# Patient Record
Sex: MALE | Race: WHITE | NOT HISPANIC OR LATINO | Employment: OTHER | URBAN - METROPOLITAN AREA
[De-identification: names, ages, dates, MRNs, and addresses within clinical notes are randomized per-mention and may not be internally consistent; named-entity substitution may affect disease eponyms.]

---

## 2017-01-17 ENCOUNTER — OFFICE VISIT (OUTPATIENT)
Dept: ORTHOPEDIC SURGERY | Facility: CLINIC | Age: 72
End: 2017-01-17

## 2017-01-17 VITALS — TEMPERATURE: 97.6 F | BODY MASS INDEX: 25.77 KG/M2 | WEIGHT: 174 LBS | HEIGHT: 69 IN

## 2017-01-17 DIAGNOSIS — M54.50 LUMBAR SPINE PAIN: Primary | ICD-10-CM

## 2017-01-17 DIAGNOSIS — M54.5 LOW BACK PAIN, UNSPECIFIED BACK PAIN LATERALITY, UNSPECIFIED CHRONICITY, WITH SCIATICA PRESENCE UNSPECIFIED: Primary | ICD-10-CM

## 2017-01-17 DIAGNOSIS — M48.061 SPINAL STENOSIS OF LUMBAR REGION: ICD-10-CM

## 2017-01-17 PROCEDURE — 72100 X-RAY EXAM L-S SPINE 2/3 VWS: CPT | Performed by: ORTHOPAEDIC SURGERY

## 2017-01-17 PROCEDURE — 99214 OFFICE O/P EST MOD 30 MIN: CPT | Performed by: ORTHOPAEDIC SURGERY

## 2017-01-17 NOTE — LETTER
January 17, 2017     Chemo Patino MD  [address]  [address]  [facility] [address]    Patient: Coleman Bear   YOB: 1945   Date of Visit: 1/17/2017       Dear Dr. Carey MD:    Thank you for referring Coleman Bear to me for evaluation. Below are the relevant portions of my assessment and plan of care.    If you have questions, please do not hesitate to call me. I look forward to following Coleman along with you.         Sincerely,        Cameron Mcneil MD        CC: MD Cameron Hawk MD  1/17/2017  1:19 PM  Signed  He still complains of pain in the left lower extremity in the thigh mostly status post L3 to 5 laminectomy and fusion with instrumentation.  As noted above when I last saw him in 2014 we sent him for epidurals for L2-3 stenosis.  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  Musculoskeletal:  Posture is unremarkable to coronal and sagittal inspection.  Motion appears slightly limited.  The skin about the area is notable for well-healed midline incision.  There is no palpable or visible deformity.  There is no local spasm.   There is no tenderness to percussion and palpation of the spine.      Neurologic:  In the bilateral lower extremities there is no evidence of atrophy.  Motor function is undisturbed in quadriceps, EHL, and gastrocnemius on the right and slight quad weakness on the left   Sensation appears symmetrically intact to light touch   .  Reflexes are 2+ and symmetrical in the patellae and absent in the Achilles.  Clonus is absent..  Gait appears undisturbed.  SLR test negative    I reviewed his plain films which show degenerative changes of mild extent above an L3 to 5 solid fusion on lumbar two-view x-rays which are basically unchanged from 2 years ago.  I also reviewed his MRI from that time which showed moderate stenosis at  L2-3.    He has L2-3 recurrent spinal stenosis for which she wants more aggressive treatment.  I'm ordering an MRI scan of the lumbar spine and have given him a prescription for tramadol.  We'll go ahead and get him an appointment to see Dr. Nate Rodriguez as well but he wants to to do the next surgery at [facility] rather than [facility]

## 2017-01-17 NOTE — PROGRESS NOTES
He still complains of pain in the left lower extremity in the thigh mostly status post L3 to 5 laminectomy and fusion with instrumentation.  As noted above when I last saw him in 2014 we sent him for epidurals for L2-3 stenosis.  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  Musculoskeletal:  Posture is unremarkable to coronal and sagittal inspection.  Motion appears slightly limited.  The skin about the area is notable for well-healed midline incision.  There is no palpable or visible deformity.  There is no local spasm.   There is no tenderness to percussion and palpation of the spine.      Neurologic:  In the bilateral lower extremities there is no evidence of atrophy.  Motor function is undisturbed in quadriceps, EHL, and gastrocnemius on the right and slight quad weakness on the left   Sensation appears symmetrically intact to light touch   .  Reflexes are 2+ and symmetrical in the patellae and absent in the Achilles.  Clonus is absent..  Gait appears undisturbed.  SLR test negative    I reviewed his plain films which show degenerative changes of mild extent above an L3 to 5 solid fusion on lumbar two-view x-rays which are basically unchanged from 2 years ago.  I also reviewed his MRI from that time which showed moderate stenosis at L2-3.    He has L2-3 recurrent spinal stenosis for which she wants more aggressive treatment.  I'm ordering an MRI scan of the lumbar spine and have given him a prescription for tramadol.  We'll go ahead and get him an appointment to see Dr. Nate Rodriguez as well but he wants to to do the next surgery at [facility] rather than [facility]

## 2017-01-19 DIAGNOSIS — M48.061 LUMBAR STENOSIS: Primary | ICD-10-CM

## 2017-01-21 ENCOUNTER — TELEPHONE (OUTPATIENT)
Dept: ORTHOPEDIC SURGERY | Facility: CLINIC | Age: 72
End: 2017-01-21

## 2017-01-21 NOTE — TELEPHONE ENCOUNTER
Have spoken with the patient and his wife he is ready to go ahead and schedule surgery and would like to be done sometime in February

## 2017-01-21 NOTE — TELEPHONE ENCOUNTER
----- Message from Cameron Mcneil MD sent at 1/19/2017 12:29 PM EST -----  Let  him know that the MRI scan shows moderate stenosis at L2-3 above the fused area.  He is going to require decompression and extension of the fusion.  Get him an appointment to see Dr. Nate Rodriguez and we can go ahead and schedule this with Dr. Nate Rodriguez at [facility], as the patient does not want to return to our.  Case request is submitted.

## 2017-02-02 ENCOUNTER — APPOINTMENT (OUTPATIENT)
Dept: PREADMISSION TESTING | Facility: HOSPITAL | Age: 72
End: 2017-02-02

## 2017-02-06 DIAGNOSIS — M48.061 LUMBAR STENOSIS: Primary | ICD-10-CM

## 2017-02-07 ENCOUNTER — APPOINTMENT (OUTPATIENT)
Dept: PREADMISSION TESTING | Facility: HOSPITAL | Age: 72
End: 2017-02-07

## 2017-02-07 ENCOUNTER — TELEPHONE (OUTPATIENT)
Dept: ORTHOPEDIC SURGERY | Facility: CLINIC | Age: 72
End: 2017-02-07

## 2017-02-07 VITALS
DIASTOLIC BLOOD PRESSURE: 74 MMHG | WEIGHT: 173 LBS | TEMPERATURE: 98.3 F | RESPIRATION RATE: 20 BRPM | SYSTOLIC BLOOD PRESSURE: 124 MMHG | BODY MASS INDEX: 25.62 KG/M2 | OXYGEN SATURATION: 97 % | HEART RATE: 63 BPM | HEIGHT: 69 IN

## 2017-02-07 LAB
ANION GAP SERPL CALCULATED.3IONS-SCNC: 16.1 MMOL/L
BUN BLD-MCNC: 18 MG/DL (ref 8–23)
BUN/CREAT SERPL: 18 (ref 7–25)
CALCIUM SPEC-SCNC: 9.6 MG/DL (ref 8.6–10.5)
CHLORIDE SERPL-SCNC: 101 MMOL/L (ref 98–107)
CO2 SERPL-SCNC: 22.9 MMOL/L (ref 22–29)
CREAT BLD-MCNC: 1 MG/DL (ref 0.76–1.27)
DEPRECATED RDW RBC AUTO: 39.1 FL (ref 37–54)
ERYTHROCYTE [DISTWIDTH] IN BLOOD BY AUTOMATED COUNT: 13 % (ref 11.5–14.5)
GFR SERPL CREATININE-BSD FRML MDRD: 73 ML/MIN/1.73
GLUCOSE BLD-MCNC: 109 MG/DL (ref 65–99)
HCT VFR BLD AUTO: 42.7 % (ref 40.4–52.2)
HGB BLD-MCNC: 14.7 G/DL (ref 13.7–17.6)
MCH RBC QN AUTO: 29 PG (ref 27–32.7)
MCHC RBC AUTO-ENTMCNC: 34.4 G/DL (ref 32.6–36.4)
MCV RBC AUTO: 84.2 FL (ref 79.8–96.2)
PLATELET # BLD AUTO: 185 10*3/MM3 (ref 140–500)
PMV BLD AUTO: 10.8 FL (ref 6–12)
POTASSIUM BLD-SCNC: 3.7 MMOL/L (ref 3.5–5.2)
RBC # BLD AUTO: 5.07 10*6/MM3 (ref 4.6–6)
SODIUM BLD-SCNC: 140 MMOL/L (ref 136–145)
WBC NRBC COR # BLD: 6.77 10*3/MM3 (ref 4.5–10.7)

## 2017-02-07 PROCEDURE — 85027 COMPLETE CBC AUTOMATED: CPT | Performed by: ORTHOPAEDIC SURGERY

## 2017-02-07 PROCEDURE — 93010 ELECTROCARDIOGRAM REPORT: CPT | Performed by: INTERNAL MEDICINE

## 2017-02-07 PROCEDURE — 93005 ELECTROCARDIOGRAM TRACING: CPT

## 2017-02-07 PROCEDURE — 80048 BASIC METABOLIC PNL TOTAL CA: CPT | Performed by: ORTHOPAEDIC SURGERY

## 2017-02-07 PROCEDURE — 36415 COLL VENOUS BLD VENIPUNCTURE: CPT

## 2017-02-07 NOTE — DISCHARGE INSTRUCTIONS
Take the following medications the morning of surgery with a small sip of water.  Amlodipine, triamterene        General Instructions:  • Do not eat or drink after midnight: includes water, mints, or gum. You may brush your teeth.  • Do not smoke, chew tobacco, or drink alcohol.  • Bring medications in original bottles, any inhalers and if applicable your C-PAP/ BI-PAP machine.  • Bring any papers given to you in the doctor’s office.  • Wear clean comfortable clothes and socks.  • Do not wear contact lenses or make-up.  Bring a case for your glasses if applicable.   • Bring crutches or walker if applicable.  • Leave all other valuables and jewelry at home.    If you were given a blood bank ID arm band remember to bring it with you the day of surgery.    Preventing a Surgical Site Infection:  Shower on the morning of surgery using a fresh bar of anti-bacterial soap (such as Dial) and clean washcloth.  Dry with a clean towel and dress in clean clothing.  For 2 to 3 days before surgery, avoid shaving with a razor near where you will have surgery because the razor can irritate skin and make it easier to develop an infection  Ask your surgeon if you will be receiving antibiotics prior to surgery  Make sure you, your family, and all healthcare providers clean their hands with soap and water or an alcohol based hand  before caring for you or your wound  If at all possible, quit smoking as many days before surgery as you can.    Day of surgery:  Upon arrival, a Pre-op nurse and Anesthesiologist will review your health history, obtain vital signs, and answer questions you may have.  The only belongings needed at this time will be your home medications and if applicable your C-PAP/BI-PAP machine.  If you are staying overnight your family can leave the rest of your belongings in the car and bring them to your room later.  A Pre-op nurse will start an IV and you may receive medication in preparation for surgery,  including something to help you relax.  Your family will be able to see you in the Pre-op area.  While you are in surgery your family should notify the waiting room  if they leave the waiting room area and provide a contact phone number.    Please be aware that surgery does come with discomfort.  We want to make every effort to control your discomfort so please discuss any uncontrolled symptoms with your nurse.   Your doctor will most likely have prescribed pain medications.      If you are going home after surgery you will receive individualized written care instructions before being discharged.  A responsible adult must drive you to and from the hospital on the day of your surgery and stay with you for 24 hours.    If you are staying overnight following surgery, you will be transported to your hospital room following the recovery period.  [facility] has all private rooms.    If you have any questions please call Pre-Admission Testing at [phone].  Deductibles and co-payments are collected on the day of service. Please be prepared to pay the required co-pay, deductible or deposit on the day of service as defined by your plan.

## 2017-02-16 ENCOUNTER — ANESTHESIA EVENT (OUTPATIENT)
Dept: PERIOP | Facility: HOSPITAL | Age: 72
End: 2017-02-16

## 2017-02-16 ENCOUNTER — ANESTHESIA (OUTPATIENT)
Dept: PERIOP | Facility: HOSPITAL | Age: 72
End: 2017-02-16

## 2017-02-16 ENCOUNTER — APPOINTMENT (OUTPATIENT)
Dept: GENERAL RADIOLOGY | Facility: HOSPITAL | Age: 72
End: 2017-02-16

## 2017-02-16 ENCOUNTER — HOSPITAL ENCOUNTER (INPATIENT)
Facility: HOSPITAL | Age: 72
LOS: 7 days | Discharge: HOME | End: 2017-02-23
Attending: ORTHOPAEDIC SURGERY | Admitting: ORTHOPAEDIC SURGERY

## 2017-02-16 DIAGNOSIS — N40.1 BENIGN PROSTATIC HYPERPLASIA WITH LOWER URINARY TRACT SYMPTOMS, UNSPECIFIED MORPHOLOGY: ICD-10-CM

## 2017-02-16 DIAGNOSIS — M48.061 LUMBAR SPINAL STENOSIS: ICD-10-CM

## 2017-02-16 DIAGNOSIS — R53.1 GENERALIZED WEAKNESS: Primary | ICD-10-CM

## 2017-02-16 LAB
HCT VFR BLD AUTO: 40.1 % (ref 40.4–52.2)
HGB BLD-MCNC: 13.7 G/DL (ref 13.7–17.6)

## 2017-02-16 PROCEDURE — C1713 ANCHOR/SCREW BN/BN,TIS/BN: HCPCS | Performed by: ORTHOPAEDIC SURGERY

## 2017-02-16 PROCEDURE — 25010000002 MIDAZOLAM PER 1 MG

## 2017-02-16 PROCEDURE — 76000 FLUOROSCOPY <1 HR PHYS/QHP: CPT

## 2017-02-16 PROCEDURE — 25010000003 CEFAZOLIN IN DEXTROSE 2-4 GM/100ML-% SOLUTION: Performed by: ORTHOPAEDIC SURGERY

## 2017-02-16 PROCEDURE — 94799 UNLISTED PULMONARY SVC/PX: CPT

## 2017-02-16 PROCEDURE — 25010000002 PROPOFOL 10 MG/ML EMULSION: Performed by: NURSE ANESTHETIST, CERTIFIED REGISTERED

## 2017-02-16 PROCEDURE — 38220 DX BONE MARROW ASPIRATIONS: CPT | Performed by: ORTHOPAEDIC SURGERY

## 2017-02-16 PROCEDURE — 25010000002 ONDANSETRON PER 1 MG: Performed by: NURSE ANESTHETIST, CERTIFIED REGISTERED

## 2017-02-16 PROCEDURE — 20936 SP BONE AGRFT LOCAL ADD-ON: CPT | Performed by: ORTHOPAEDIC SURGERY

## 2017-02-16 PROCEDURE — 22612 ARTHRD PST TQ 1NTRSPC LUMBAR: CPT | Performed by: ORTHOPAEDIC SURGERY

## 2017-02-16 PROCEDURE — 25010000002 POTASSIUM CHLORIDE PER 2 MEQ OF POTASSIUM: Performed by: ORTHOPAEDIC SURGERY

## 2017-02-16 PROCEDURE — 25010000002 DEXAMETHASONE PER 1 MG: Performed by: NURSE ANESTHETIST, CERTIFIED REGISTERED

## 2017-02-16 PROCEDURE — 85018 HEMOGLOBIN: CPT | Performed by: ORTHOPAEDIC SURGERY

## 2017-02-16 PROCEDURE — 25010000002 HYDROMORPHONE PER 4 MG: Performed by: NURSE ANESTHETIST, CERTIFIED REGISTERED

## 2017-02-16 PROCEDURE — 0SG0071 FUSION OF LUMBAR VERTEBRAL JOINT WITH AUTOLOGOUS TISSUE SUBSTITUTE, POSTERIOR APPROACH, POSTERIOR COLUMN, OPEN APPROACH: ICD-10-PCS | Performed by: ORTHOPAEDIC SURGERY

## 2017-02-16 PROCEDURE — 25010000002 FENTANYL CITRATE (PF) 100 MCG/2ML SOLUTION: Performed by: NURSE ANESTHETIST, CERTIFIED REGISTERED

## 2017-02-16 PROCEDURE — 25010000003 CEFAZOLIN PER 500 MG: Performed by: ORTHOPAEDIC SURGERY

## 2017-02-16 PROCEDURE — S0260 H&P FOR SURGERY: HCPCS | Performed by: ORTHOPAEDIC SURGERY

## 2017-02-16 PROCEDURE — 20930 SP BONE ALGRFT MORSEL ADD-ON: CPT | Performed by: ORTHOPAEDIC SURGERY

## 2017-02-16 PROCEDURE — 22840 INSERT SPINE FIXATION DEVICE: CPT | Performed by: ORTHOPAEDIC SURGERY

## 2017-02-16 PROCEDURE — 85014 HEMATOCRIT: CPT | Performed by: ORTHOPAEDIC SURGERY

## 2017-02-16 PROCEDURE — 72100 X-RAY EXAM L-S SPINE 2/3 VWS: CPT

## 2017-02-16 PROCEDURE — 25010000002 ONDANSETRON PER 1 MG: Performed by: ORTHOPAEDIC SURGERY

## 2017-02-16 PROCEDURE — 25010000002 NEOSTIGMINE 10 MG/10ML SOLUTION: Performed by: NURSE ANESTHETIST, CERTIFIED REGISTERED

## 2017-02-16 PROCEDURE — 07DR3ZZ EXTRACTION OF ILIAC BONE MARROW, PERCUTANEOUS APPROACH: ICD-10-PCS | Performed by: ORTHOPAEDIC SURGERY

## 2017-02-16 PROCEDURE — 63047 LAM FACETEC & FORAMOT LUMBAR: CPT | Performed by: ORTHOPAEDIC SURGERY

## 2017-02-16 DEVICE — SCRW EXPEDIUM PA TI 6X45MM: Type: IMPLANTABLE DEVICE | Site: SPINE LUMBAR | Status: FUNCTIONAL

## 2017-02-16 DEVICE — STRIP 7800310 MASTERGRAFT STRIP 10CM
Type: IMPLANTABLE DEVICE | Site: SPINE LUMBAR | Status: FUNCTIONAL
Brand: MASTERGRAFT® STRIP

## 2017-02-16 DEVICE — SCRW INNR EXPEDIUM: Type: IMPLANTABLE DEVICE | Site: SPINE LUMBAR | Status: FUNCTIONAL

## 2017-02-16 DEVICE — ROD PREBNT SPINE EXPEDIUM TI 5.5X45MM: Type: IMPLANTABLE DEVICE | Site: SPINE LUMBAR | Status: FUNCTIONAL

## 2017-02-16 RX ORDER — HYDROCODONE BITARTRATE AND ACETAMINOPHEN 5; 325 MG/1; MG/1
1 TABLET ORAL ONCE AS NEEDED
Status: DISCONTINUED | OUTPATIENT
Start: 2017-02-16 | End: 2017-02-16 | Stop reason: HOSPADM

## 2017-02-16 RX ORDER — LIDOCAINE HYDROCHLORIDE 20 MG/ML
INJECTION, SOLUTION INFILTRATION; PERINEURAL AS NEEDED
Status: DISCONTINUED | OUTPATIENT
Start: 2017-02-16 | End: 2017-02-16 | Stop reason: SURG

## 2017-02-16 RX ORDER — TERAZOSIN 5 MG/1
5 CAPSULE ORAL EVERY MORNING
Status: DISCONTINUED | OUTPATIENT
Start: 2017-02-17 | End: 2017-02-23 | Stop reason: HOSPADM

## 2017-02-16 RX ORDER — CEFAZOLIN SODIUM 2 G/100ML
2 INJECTION, SOLUTION INTRAVENOUS ONCE
Status: COMPLETED | OUTPATIENT
Start: 2017-02-16 | End: 2017-02-16

## 2017-02-16 RX ORDER — MIDAZOLAM HYDROCHLORIDE 1 MG/ML
2 INJECTION INTRAMUSCULAR; INTRAVENOUS
Status: DISCONTINUED | OUTPATIENT
Start: 2017-02-16 | End: 2017-02-16 | Stop reason: HOSPADM

## 2017-02-16 RX ORDER — BISACODYL 10 MG
10 SUPPOSITORY, RECTAL RECTAL DAILY PRN
Status: DISCONTINUED | OUTPATIENT
Start: 2017-02-16 | End: 2017-02-23 | Stop reason: HOSPADM

## 2017-02-16 RX ORDER — PROMETHAZINE HYDROCHLORIDE 25 MG/1
25 TABLET ORAL ONCE AS NEEDED
Status: DISCONTINUED | OUTPATIENT
Start: 2017-02-16 | End: 2017-02-16 | Stop reason: HOSPADM

## 2017-02-16 RX ORDER — GLYCOPYRROLATE 0.2 MG/ML
INJECTION INTRAMUSCULAR; INTRAVENOUS AS NEEDED
Status: DISCONTINUED | OUTPATIENT
Start: 2017-02-16 | End: 2017-02-16 | Stop reason: SURG

## 2017-02-16 RX ORDER — ONDANSETRON 2 MG/ML
4 INJECTION INTRAMUSCULAR; INTRAVENOUS EVERY 6 HOURS PRN
Status: DISCONTINUED | OUTPATIENT
Start: 2017-02-16 | End: 2017-02-23 | Stop reason: HOSPADM

## 2017-02-16 RX ORDER — SODIUM CHLORIDE, SODIUM LACTATE, POTASSIUM CHLORIDE, CALCIUM CHLORIDE 600; 310; 30; 20 MG/100ML; MG/100ML; MG/100ML; MG/100ML
100 INJECTION, SOLUTION INTRAVENOUS CONTINUOUS
Status: DISCONTINUED | OUTPATIENT
Start: 2017-02-16 | End: 2017-02-18

## 2017-02-16 RX ORDER — FENTANYL CITRATE 50 UG/ML
INJECTION, SOLUTION INTRAMUSCULAR; INTRAVENOUS AS NEEDED
Status: DISCONTINUED | OUTPATIENT
Start: 2017-02-16 | End: 2017-02-16 | Stop reason: SURG

## 2017-02-16 RX ORDER — IPRATROPIUM BROMIDE AND ALBUTEROL SULFATE 2.5; .5 MG/3ML; MG/3ML
3 SOLUTION RESPIRATORY (INHALATION) ONCE AS NEEDED
Status: DISCONTINUED | OUTPATIENT
Start: 2017-02-16 | End: 2017-02-16 | Stop reason: HOSPADM

## 2017-02-16 RX ORDER — OXYCODONE HYDROCHLORIDE AND ACETAMINOPHEN 5; 325 MG/1; MG/1
2 TABLET ORAL EVERY 4 HOURS PRN
Status: DISCONTINUED | OUTPATIENT
Start: 2017-02-16 | End: 2017-02-23 | Stop reason: HOSPADM

## 2017-02-16 RX ORDER — PROMETHAZINE HYDROCHLORIDE 25 MG/1
25 SUPPOSITORY RECTAL ONCE AS NEEDED
Status: DISCONTINUED | OUTPATIENT
Start: 2017-02-16 | End: 2017-02-16 | Stop reason: HOSPADM

## 2017-02-16 RX ORDER — HYDROMORPHONE HYDROCHLORIDE 1 MG/ML
0.5 INJECTION, SOLUTION INTRAMUSCULAR; INTRAVENOUS; SUBCUTANEOUS
Status: DISCONTINUED | OUTPATIENT
Start: 2017-02-16 | End: 2017-02-16 | Stop reason: HOSPADM

## 2017-02-16 RX ORDER — ONDANSETRON 2 MG/ML
4 INJECTION INTRAMUSCULAR; INTRAVENOUS ONCE AS NEEDED
Status: DISCONTINUED | OUTPATIENT
Start: 2017-02-16 | End: 2017-02-16 | Stop reason: HOSPADM

## 2017-02-16 RX ORDER — PROMETHAZINE HYDROCHLORIDE 25 MG/ML
6.25 INJECTION, SOLUTION INTRAMUSCULAR; INTRAVENOUS ONCE AS NEEDED
Status: DISCONTINUED | OUTPATIENT
Start: 2017-02-16 | End: 2017-02-16 | Stop reason: HOSPADM

## 2017-02-16 RX ORDER — NEOSTIGMINE METHYLSULFATE 1 MG/ML
INJECTION, SOLUTION INTRAVENOUS AS NEEDED
Status: DISCONTINUED | OUTPATIENT
Start: 2017-02-16 | End: 2017-02-16 | Stop reason: SURG

## 2017-02-16 RX ORDER — SENNA AND DOCUSATE SODIUM 50; 8.6 MG/1; MG/1
1 TABLET, FILM COATED ORAL 2 TIMES DAILY
Status: DISCONTINUED | OUTPATIENT
Start: 2017-02-16 | End: 2017-02-19

## 2017-02-16 RX ORDER — ROCURONIUM BROMIDE 10 MG/ML
INJECTION, SOLUTION INTRAVENOUS AS NEEDED
Status: DISCONTINUED | OUTPATIENT
Start: 2017-02-16 | End: 2017-02-16 | Stop reason: SURG

## 2017-02-16 RX ORDER — SODIUM CHLORIDE AND POTASSIUM CHLORIDE 150; 450 MG/100ML; MG/100ML
100 INJECTION, SOLUTION INTRAVENOUS CONTINUOUS
Status: DISCONTINUED | OUTPATIENT
Start: 2017-02-16 | End: 2017-02-16 | Stop reason: SDUPTHER

## 2017-02-16 RX ORDER — SODIUM CHLORIDE 0.9 % (FLUSH) 0.9 %
1-10 SYRINGE (ML) INJECTION AS NEEDED
Status: DISCONTINUED | OUTPATIENT
Start: 2017-02-16 | End: 2017-02-16 | Stop reason: HOSPADM

## 2017-02-16 RX ORDER — AMLODIPINE BESYLATE 10 MG/1
10 TABLET ORAL EVERY MORNING
Status: DISCONTINUED | OUTPATIENT
Start: 2017-02-17 | End: 2017-02-23 | Stop reason: HOSPADM

## 2017-02-16 RX ORDER — ONDANSETRON 2 MG/ML
INJECTION INTRAMUSCULAR; INTRAVENOUS AS NEEDED
Status: DISCONTINUED | OUTPATIENT
Start: 2017-02-16 | End: 2017-02-16 | Stop reason: SURG

## 2017-02-16 RX ORDER — HYDROMORPHONE HCL IN 0.9% NACL 10 MG/50ML
PATIENT CONTROLLED ANALGESIA SYRINGE INTRAVENOUS CONTINUOUS
Status: DISCONTINUED | OUTPATIENT
Start: 2017-02-16 | End: 2017-02-18

## 2017-02-16 RX ORDER — FAMOTIDINE 10 MG/ML
20 INJECTION, SOLUTION INTRAVENOUS ONCE
Status: COMPLETED | OUTPATIENT
Start: 2017-02-16 | End: 2017-02-16

## 2017-02-16 RX ORDER — MIDAZOLAM HYDROCHLORIDE 1 MG/ML
1 INJECTION INTRAMUSCULAR; INTRAVENOUS
Status: DISCONTINUED | OUTPATIENT
Start: 2017-02-16 | End: 2017-02-16 | Stop reason: HOSPADM

## 2017-02-16 RX ORDER — CYCLOBENZAPRINE HCL 10 MG
10 TABLET ORAL 3 TIMES DAILY PRN
Status: DISCONTINUED | OUTPATIENT
Start: 2017-02-16 | End: 2017-02-23 | Stop reason: HOSPADM

## 2017-02-16 RX ORDER — LABETALOL HYDROCHLORIDE 5 MG/ML
5 INJECTION, SOLUTION INTRAVENOUS
Status: DISCONTINUED | OUTPATIENT
Start: 2017-02-16 | End: 2017-02-16 | Stop reason: HOSPADM

## 2017-02-16 RX ORDER — ONDANSETRON 4 MG/1
4 TABLET, FILM COATED ORAL EVERY 6 HOURS PRN
Status: DISCONTINUED | OUTPATIENT
Start: 2017-02-16 | End: 2017-02-23 | Stop reason: HOSPADM

## 2017-02-16 RX ORDER — FAMOTIDINE 10 MG/ML
INJECTION, SOLUTION INTRAVENOUS
Status: COMPLETED
Start: 2017-02-16 | End: 2017-02-16

## 2017-02-16 RX ORDER — ONDANSETRON 4 MG/1
4 TABLET, ORALLY DISINTEGRATING ORAL EVERY 6 HOURS PRN
Status: DISCONTINUED | OUTPATIENT
Start: 2017-02-16 | End: 2017-02-23 | Stop reason: HOSPADM

## 2017-02-16 RX ORDER — TRIAMTERENE AND HYDROCHLOROTHIAZIDE 37.5; 25 MG/1; MG/1
1 CAPSULE ORAL EVERY MORNING
Status: DISCONTINUED | OUTPATIENT
Start: 2017-02-17 | End: 2017-02-23 | Stop reason: HOSPADM

## 2017-02-16 RX ORDER — CEFAZOLIN SODIUM 2 G/100ML
2 INJECTION, SOLUTION INTRAVENOUS EVERY 8 HOURS
Status: COMPLETED | OUTPATIENT
Start: 2017-02-16 | End: 2017-02-17

## 2017-02-16 RX ORDER — PROPOFOL 10 MG/ML
VIAL (ML) INTRAVENOUS AS NEEDED
Status: DISCONTINUED | OUTPATIENT
Start: 2017-02-16 | End: 2017-02-16 | Stop reason: SURG

## 2017-02-16 RX ORDER — TEMAZEPAM 15 MG/1
15 CAPSULE ORAL NIGHTLY PRN
Status: DISCONTINUED | OUTPATIENT
Start: 2017-02-16 | End: 2017-02-23 | Stop reason: HOSPADM

## 2017-02-16 RX ORDER — NALOXONE HCL 0.4 MG/ML
0.1 VIAL (ML) INJECTION
Status: DISCONTINUED | OUTPATIENT
Start: 2017-02-16 | End: 2017-02-23 | Stop reason: HOSPADM

## 2017-02-16 RX ORDER — SODIUM CHLORIDE, SODIUM LACTATE, POTASSIUM CHLORIDE, CALCIUM CHLORIDE 600; 310; 30; 20 MG/100ML; MG/100ML; MG/100ML; MG/100ML
9 INJECTION, SOLUTION INTRAVENOUS CONTINUOUS
Status: DISCONTINUED | OUTPATIENT
Start: 2017-02-16 | End: 2017-02-18

## 2017-02-16 RX ORDER — DEXAMETHASONE SODIUM PHOSPHATE 10 MG/ML
INJECTION INTRAMUSCULAR; INTRAVENOUS AS NEEDED
Status: DISCONTINUED | OUTPATIENT
Start: 2017-02-16 | End: 2017-02-16 | Stop reason: SURG

## 2017-02-16 RX ORDER — MIDAZOLAM HYDROCHLORIDE 1 MG/ML
INJECTION INTRAMUSCULAR; INTRAVENOUS
Status: COMPLETED
Start: 2017-02-16 | End: 2017-02-16

## 2017-02-16 RX ORDER — POLYETHYLENE GLYCOL 3350 17 G/17G
17 POWDER, FOR SOLUTION ORAL DAILY PRN
Status: DISCONTINUED | OUTPATIENT
Start: 2017-02-16 | End: 2017-02-18

## 2017-02-16 RX ORDER — HYDROMORPHONE HCL IN 0.9% NACL 10 MG/50ML
PATIENT CONTROLLED ANALGESIA SYRINGE INTRAVENOUS
Status: COMPLETED
Start: 2017-02-16 | End: 2017-02-16

## 2017-02-16 RX ORDER — SODIUM CHLORIDE 0.9 % (FLUSH) 0.9 %
1-10 SYRINGE (ML) INJECTION AS NEEDED
Status: DISCONTINUED | OUTPATIENT
Start: 2017-02-16 | End: 2017-02-23 | Stop reason: HOSPADM

## 2017-02-16 RX ORDER — FENTANYL CITRATE 50 UG/ML
50 INJECTION, SOLUTION INTRAMUSCULAR; INTRAVENOUS
Status: DISCONTINUED | OUTPATIENT
Start: 2017-02-16 | End: 2017-02-16 | Stop reason: HOSPADM

## 2017-02-16 RX ORDER — HYDRALAZINE HYDROCHLORIDE 20 MG/ML
5 INJECTION INTRAMUSCULAR; INTRAVENOUS
Status: DISCONTINUED | OUTPATIENT
Start: 2017-02-16 | End: 2017-02-16 | Stop reason: HOSPADM

## 2017-02-16 RX ADMIN — FENTANYL CITRATE 50 MCG: 50 INJECTION INTRAMUSCULAR; INTRAVENOUS at 07:37

## 2017-02-16 RX ADMIN — FENTANYL CITRATE 50 MCG: 50 INJECTION INTRAMUSCULAR; INTRAVENOUS at 09:45

## 2017-02-16 RX ADMIN — FENTANYL CITRATE 25 MCG: 50 INJECTION INTRAMUSCULAR; INTRAVENOUS at 10:35

## 2017-02-16 RX ADMIN — GLYCOPYRROLATE 0.6 MG: 0.2 INJECTION INTRAMUSCULAR; INTRAVENOUS at 10:37

## 2017-02-16 RX ADMIN — FENTANYL CITRATE 50 MCG: 50 INJECTION INTRAMUSCULAR; INTRAVENOUS at 13:19

## 2017-02-16 RX ADMIN — PROPOFOL 150 MG: 10 INJECTION, EMULSION INTRAVENOUS at 07:37

## 2017-02-16 RX ADMIN — POTASSIUM CHLORIDE 100 ML/HR: 149 INJECTION, SOLUTION, CONCENTRATE INTRAVENOUS at 17:52

## 2017-02-16 RX ADMIN — HYDROMORPHONE HYDROCHLORIDE 0.5 MG: 1 INJECTION, SOLUTION INTRAMUSCULAR; INTRAVENOUS; SUBCUTANEOUS at 11:14

## 2017-02-16 RX ADMIN — FENTANYL CITRATE 50 MCG: 50 INJECTION INTRAMUSCULAR; INTRAVENOUS at 11:28

## 2017-02-16 RX ADMIN — ONDANSETRON 4 MG: 2 INJECTION INTRAMUSCULAR; INTRAVENOUS at 17:52

## 2017-02-16 RX ADMIN — Medication: at 11:08

## 2017-02-16 RX ADMIN — ROCURONIUM BROMIDE 10 MG: 10 INJECTION INTRAVENOUS at 08:45

## 2017-02-16 RX ADMIN — CEFAZOLIN SODIUM 2 G: 2 INJECTION, SOLUTION INTRAVENOUS at 17:51

## 2017-02-16 RX ADMIN — MIDAZOLAM 2 MG: 1 INJECTION INTRAMUSCULAR; INTRAVENOUS at 07:26

## 2017-02-16 RX ADMIN — ROCURONIUM BROMIDE 50 MG: 10 INJECTION INTRAVENOUS at 07:37

## 2017-02-16 RX ADMIN — EPHEDRINE SULFATE 10 MG: 50 INJECTION INTRAMUSCULAR; INTRAVENOUS; SUBCUTANEOUS at 08:57

## 2017-02-16 RX ADMIN — SODIUM CHLORIDE, POTASSIUM CHLORIDE, SODIUM LACTATE AND CALCIUM CHLORIDE: 600; 310; 30; 20 INJECTION, SOLUTION INTRAVENOUS at 09:01

## 2017-02-16 RX ADMIN — FENTANYL CITRATE 50 MCG: 50 INJECTION INTRAMUSCULAR; INTRAVENOUS at 12:15

## 2017-02-16 RX ADMIN — OXYCODONE HYDROCHLORIDE AND ACETAMINOPHEN 2 TABLET: 5; 325 TABLET ORAL at 23:37

## 2017-02-16 RX ADMIN — DOCUSATE SODIUM -SENNOSIDES 1 TABLET: 50; 8.6 TABLET, COATED ORAL at 17:51

## 2017-02-16 RX ADMIN — CEFAZOLIN SODIUM 2 G: 2 INJECTION, SOLUTION INTRAVENOUS at 07:32

## 2017-02-16 RX ADMIN — LIDOCAINE HYDROCHLORIDE 60 MG: 20 INJECTION, SOLUTION INFILTRATION; PERINEURAL at 07:37

## 2017-02-16 RX ADMIN — FENTANYL CITRATE 50 MCG: 50 INJECTION INTRAMUSCULAR; INTRAVENOUS at 08:07

## 2017-02-16 RX ADMIN — SODIUM CHLORIDE, POTASSIUM CHLORIDE, SODIUM LACTATE AND CALCIUM CHLORIDE 100 ML/HR: 600; 310; 30; 20 INJECTION, SOLUTION INTRAVENOUS at 11:54

## 2017-02-16 RX ADMIN — EPHEDRINE SULFATE 10 MG: 50 INJECTION INTRAMUSCULAR; INTRAVENOUS; SUBCUTANEOUS at 08:20

## 2017-02-16 RX ADMIN — NEOSTIGMINE METHYLSULFATE 4 MG: 1 INJECTION INTRAVENOUS at 10:37

## 2017-02-16 RX ADMIN — FENTANYL CITRATE 50 MCG: 50 INJECTION INTRAMUSCULAR; INTRAVENOUS at 11:14

## 2017-02-16 RX ADMIN — FAMOTIDINE 20 MG: 10 INJECTION, SOLUTION INTRAVENOUS at 07:26

## 2017-02-16 RX ADMIN — OXYCODONE HYDROCHLORIDE AND ACETAMINOPHEN 2 TABLET: 5; 325 TABLET ORAL at 17:51

## 2017-02-16 RX ADMIN — DEXAMETHASONE SODIUM PHOSPHATE 8 MG: 10 INJECTION INTRAMUSCULAR; INTRAVENOUS at 07:50

## 2017-02-16 RX ADMIN — SODIUM CHLORIDE, POTASSIUM CHLORIDE, SODIUM LACTATE AND CALCIUM CHLORIDE 9 ML/HR: 600; 310; 30; 20 INJECTION, SOLUTION INTRAVENOUS at 07:25

## 2017-02-16 RX ADMIN — ONDANSETRON 4 MG: 2 INJECTION INTRAMUSCULAR; INTRAVENOUS at 10:30

## 2017-02-16 RX ADMIN — MIDAZOLAM HYDROCHLORIDE 2 MG: 1 INJECTION INTRAMUSCULAR; INTRAVENOUS at 07:26

## 2017-02-16 RX ADMIN — ROCURONIUM BROMIDE 10 MG: 10 INJECTION INTRAVENOUS at 08:25

## 2017-02-16 NOTE — ANESTHESIA PREPROCEDURE EVALUATION
Anesthesia Evaluation        Airway   Mallampati: II  TM distance: >3 FB  Neck ROM: full  no difficulty expected  Dental      Comment: Upper front caps    Pulmonary - normal exam   Cardiovascular - normal exam    (+) hypertension,       Neuro/Psych  GI/Hepatic/Renal/Endo      Musculoskeletal     Abdominal    Substance History      OB/GYN          Other   (+) arthritis                                 Anesthesia Plan    ASA 2     general     intravenous induction   Anesthetic plan and risks discussed with patient.

## 2017-02-16 NOTE — ANESTHESIA PROCEDURE NOTES
Airway  Urgency: elective    Airway not difficult    General Information and Staff    Patient location during procedure: OR  Anesthesiologist: KISHORE XIONG  CRNA: MARIA DEL CARMEN CHAIDEZ    Indications and Patient Condition  Indications for airway management: airway protection    Preoxygenated: yes  MILS not maintained throughout  Mask difficulty assessment: 1 - vent by mask    Final Airway Details  Final airway type: endotracheal airway      Successful airway: ETT  Cuffed: yes   Successful intubation technique: direct laryngoscopy  Facilitating devices/methods: intubating stylet  Endotracheal tube insertion site: oral  Blade: Michael  Blade size: #3  ETT size: 8.0 mm  Cormack-Lehane Classification: grade I - full view of glottis  Placement verified by: chest auscultation   Cuff volume (mL): 9  Measured from: lips  ETT to lips (cm): 23  Number of attempts at approach: 1    Additional Comments  PreO2 100% face mask, IV induction, easy mask, DVL x1, cords noted, tube through, cuff up, EBBSH, +etCO2, = chest movement, tube secured in place, atraumatic, teeth and lips intact as preop.

## 2017-02-16 NOTE — ANESTHESIA POSTPROCEDURE EVALUATION
Patient: Coleman Bear    Procedure Summary     Date Anesthesia Start Anesthesia Stop Room / Location    02/16/17 0732 1055  [facility] / [facility]       Procedure Diagnosis Surgeon Provider    L2-L3 repeat laminectomy and fusion with instrumentation, removal instrumentation L3 to L5 (N/A Spine Lumbar) Lumbar stenosis  (Lumbar stenosis [M48.06]) Cameron Mcneil MD; MD Velvet Hawk MD          Anesthesia Type: general  Last vitals  /70 (02/16/17 1145)    Temp      Pulse 54 (02/16/17 1145)   Resp 16 (02/16/17 1145)    SpO2 98 % (02/16/17 1145)      Post Anesthesia Care and Evaluation    Patient location during evaluation: PACU  Patient participation: complete - patient participated  Level of consciousness: awake and alert  Pain management: adequate  Airway patency: patent  Anesthetic complications: No anesthetic complications    Cardiovascular status: acceptable  Respiratory status: acceptable  Hydration status: acceptable

## 2017-02-17 LAB — HGB BLD-MCNC: 11.9 G/DL (ref 13.7–17.6)

## 2017-02-17 PROCEDURE — 97110 THERAPEUTIC EXERCISES: CPT

## 2017-02-17 PROCEDURE — 97161 PT EVAL LOW COMPLEX 20 MIN: CPT

## 2017-02-17 PROCEDURE — 99024 POSTOP FOLLOW-UP VISIT: CPT | Performed by: ORTHOPAEDIC SURGERY

## 2017-02-17 PROCEDURE — 25010000003 CEFAZOLIN IN DEXTROSE 2-4 GM/100ML-% SOLUTION: Performed by: ORTHOPAEDIC SURGERY

## 2017-02-17 PROCEDURE — 85018 HEMOGLOBIN: CPT | Performed by: ORTHOPAEDIC SURGERY

## 2017-02-17 RX ADMIN — TERAZOSIN HYDROCHLORIDE 5 MG: 5 CAPSULE ORAL at 08:51

## 2017-02-17 RX ADMIN — CYCLOBENZAPRINE HYDROCHLORIDE 10 MG: 10 TABLET, FILM COATED ORAL at 16:53

## 2017-02-17 RX ADMIN — OXYCODONE HYDROCHLORIDE AND ACETAMINOPHEN 2 TABLET: 5; 325 TABLET ORAL at 13:33

## 2017-02-17 RX ADMIN — Medication: at 18:54

## 2017-02-17 RX ADMIN — OXYCODONE HYDROCHLORIDE AND ACETAMINOPHEN 2 TABLET: 5; 325 TABLET ORAL at 08:51

## 2017-02-17 RX ADMIN — AMLODIPINE BESYLATE 10 MG: 10 TABLET ORAL at 08:51

## 2017-02-17 RX ADMIN — BISACODYL 10 MG: 10 SUPPOSITORY RECTAL at 16:54

## 2017-02-17 RX ADMIN — POLYETHYLENE GLYCOL 3350 17 G: 17 POWDER, FOR SOLUTION ORAL at 21:21

## 2017-02-17 RX ADMIN — DOCUSATE SODIUM -SENNOSIDES 1 TABLET: 50; 8.6 TABLET, COATED ORAL at 08:52

## 2017-02-17 RX ADMIN — CEFAZOLIN SODIUM 2 G: 2 INJECTION, SOLUTION INTRAVENOUS at 01:42

## 2017-02-17 RX ADMIN — DOCUSATE SODIUM -SENNOSIDES 1 TABLET: 50; 8.6 TABLET, COATED ORAL at 21:22

## 2017-02-17 RX ADMIN — TRIAMTERENE AND HYDROCHLOROTHIAZIDE 1 CAPSULE: 37.5; 25 CAPSULE ORAL at 08:50

## 2017-02-17 RX ADMIN — CYCLOBENZAPRINE HYDROCHLORIDE 10 MG: 10 TABLET, FILM COATED ORAL at 03:35

## 2017-02-17 RX ADMIN — OXYCODONE HYDROCHLORIDE AND ACETAMINOPHEN 2 TABLET: 5; 325 TABLET ORAL at 03:35

## 2017-02-18 ENCOUNTER — APPOINTMENT (OUTPATIENT)
Dept: GENERAL RADIOLOGY | Facility: HOSPITAL | Age: 72
End: 2017-02-18

## 2017-02-18 LAB
ALBUMIN SERPL-MCNC: 3.3 G/DL (ref 3.5–5.2)
ALBUMIN/GLOB SERPL: 1.1 G/DL
ALP SERPL-CCNC: 59 U/L (ref 39–117)
ALT SERPL W P-5'-P-CCNC: 15 U/L (ref 1–41)
ANION GAP SERPL CALCULATED.3IONS-SCNC: 14.3 MMOL/L
AST SERPL-CCNC: 26 U/L (ref 1–40)
BILIRUB SERPL-MCNC: 1 MG/DL (ref 0.1–1.2)
BUN BLD-MCNC: 15 MG/DL (ref 8–23)
BUN/CREAT SERPL: 12.2 (ref 7–25)
CALCIUM SPEC-SCNC: 9.1 MG/DL (ref 8.6–10.5)
CHLORIDE SERPL-SCNC: 94 MMOL/L (ref 98–107)
CO2 SERPL-SCNC: 25.7 MMOL/L (ref 22–29)
CREAT BLD-MCNC: 1.23 MG/DL (ref 0.76–1.27)
GFR SERPL CREATININE-BSD FRML MDRD: 58 ML/MIN/1.73
GLOBULIN UR ELPH-MCNC: 3.1 GM/DL
GLUCOSE BLD-MCNC: 192 MG/DL (ref 65–99)
HGB BLD-MCNC: 12.3 G/DL (ref 13.7–17.6)
PHOSPHATE SERPL-MCNC: 2.7 MG/DL (ref 2.5–4.5)
POTASSIUM BLD-SCNC: 3.6 MMOL/L (ref 3.5–5.2)
PROT SERPL-MCNC: 6.4 G/DL (ref 6–8.5)
SODIUM BLD-SCNC: 134 MMOL/L (ref 136–145)

## 2017-02-18 PROCEDURE — 80053 COMPREHEN METABOLIC PANEL: CPT | Performed by: INTERNAL MEDICINE

## 2017-02-18 PROCEDURE — 25010000002 ONDANSETRON PER 1 MG: Performed by: ORTHOPAEDIC SURGERY

## 2017-02-18 PROCEDURE — 97110 THERAPEUTIC EXERCISES: CPT

## 2017-02-18 PROCEDURE — 99024 POSTOP FOLLOW-UP VISIT: CPT | Performed by: ORTHOPAEDIC SURGERY

## 2017-02-18 PROCEDURE — 74000 HC ABDOMEN KUB: CPT

## 2017-02-18 PROCEDURE — 84100 ASSAY OF PHOSPHORUS: CPT | Performed by: INTERNAL MEDICINE

## 2017-02-18 PROCEDURE — 85018 HEMOGLOBIN: CPT | Performed by: ORTHOPAEDIC SURGERY

## 2017-02-18 PROCEDURE — 94799 UNLISTED PULMONARY SVC/PX: CPT

## 2017-02-18 RX ORDER — POTASSIUM CHLORIDE 750 MG/1
40 CAPSULE, EXTENDED RELEASE ORAL ONCE
Status: COMPLETED | OUTPATIENT
Start: 2017-02-18 | End: 2017-02-18

## 2017-02-18 RX ORDER — OXYCODONE HYDROCHLORIDE AND ACETAMINOPHEN 5; 325 MG/1; MG/1
2 TABLET ORAL EVERY 4 HOURS PRN
Qty: 60 TABLET | Refills: 0 | Status: SHIPPED | OUTPATIENT
Start: 2017-02-18 | End: 2017-02-26

## 2017-02-18 RX ORDER — SODIUM CHLORIDE 9 MG/ML
125 INJECTION, SOLUTION INTRAVENOUS CONTINUOUS
Status: DISCONTINUED | OUTPATIENT
Start: 2017-02-18 | End: 2017-02-22

## 2017-02-18 RX ORDER — SENNA AND DOCUSATE SODIUM 50; 8.6 MG/1; MG/1
1 TABLET, FILM COATED ORAL 2 TIMES DAILY
Qty: 35 TABLET | Refills: 1 | Status: SHIPPED | OUTPATIENT
Start: 2017-02-18 | End: 2017-06-28

## 2017-02-18 RX ORDER — POLYETHYLENE GLYCOL 3350 17 G/17G
17 POWDER, FOR SOLUTION ORAL 2 TIMES DAILY
Status: DISCONTINUED | OUTPATIENT
Start: 2017-02-18 | End: 2017-02-23 | Stop reason: HOSPADM

## 2017-02-18 RX ORDER — ACETAMINOPHEN 325 MG/1
650 TABLET ORAL EVERY 6 HOURS PRN
Status: DISCONTINUED | OUTPATIENT
Start: 2017-02-18 | End: 2017-02-23 | Stop reason: HOSPADM

## 2017-02-18 RX ADMIN — ONDANSETRON 4 MG: 2 INJECTION INTRAMUSCULAR; INTRAVENOUS at 02:51

## 2017-02-18 RX ADMIN — POTASSIUM CHLORIDE 40 MEQ: 750 CAPSULE, EXTENDED RELEASE ORAL at 16:59

## 2017-02-18 RX ADMIN — TEMAZEPAM 15 MG: 15 CAPSULE ORAL at 22:11

## 2017-02-18 RX ADMIN — ONDANSETRON 4 MG: 2 INJECTION INTRAMUSCULAR; INTRAVENOUS at 13:47

## 2017-02-18 RX ADMIN — DOCUSATE SODIUM -SENNOSIDES 1 TABLET: 50; 8.6 TABLET, COATED ORAL at 17:46

## 2017-02-18 RX ADMIN — AMLODIPINE BESYLATE 10 MG: 10 TABLET ORAL at 08:28

## 2017-02-18 RX ADMIN — TRIAMTERENE AND HYDROCHLOROTHIAZIDE 1 CAPSULE: 37.5; 25 CAPSULE ORAL at 08:27

## 2017-02-18 RX ADMIN — POLYETHYLENE GLYCOL 3350 17 G: 17 POWDER, FOR SOLUTION ORAL at 17:00

## 2017-02-18 RX ADMIN — SODIUM CHLORIDE 125 ML/HR: 9 INJECTION, SOLUTION INTRAVENOUS at 14:07

## 2017-02-18 RX ADMIN — TERAZOSIN HYDROCHLORIDE 5 MG: 5 CAPSULE ORAL at 08:28

## 2017-02-18 RX ADMIN — BISACODYL 10 MG: 10 SUPPOSITORY RECTAL at 13:48

## 2017-02-18 RX ADMIN — DOCUSATE SODIUM -SENNOSIDES 1 TABLET: 50; 8.6 TABLET, COATED ORAL at 08:27

## 2017-02-18 RX ADMIN — OXYCODONE HYDROCHLORIDE AND ACETAMINOPHEN 2 TABLET: 5; 325 TABLET ORAL at 22:11

## 2017-02-19 LAB
ANION GAP SERPL CALCULATED.3IONS-SCNC: 14.8 MMOL/L
BUN BLD-MCNC: 14 MG/DL (ref 8–23)
BUN/CREAT SERPL: 11.3 (ref 7–25)
CALCIUM SPEC-SCNC: 9.2 MG/DL (ref 8.6–10.5)
CHLORIDE SERPL-SCNC: 95 MMOL/L (ref 98–107)
CO2 SERPL-SCNC: 25.2 MMOL/L (ref 22–29)
CREAT BLD-MCNC: 1.24 MG/DL (ref 0.76–1.27)
GFR SERPL CREATININE-BSD FRML MDRD: 57 ML/MIN/1.73
GLUCOSE BLD-MCNC: 201 MG/DL (ref 65–99)
MAGNESIUM SERPL-MCNC: 2 MG/DL (ref 1.6–2.4)
POTASSIUM BLD-SCNC: 3.6 MMOL/L (ref 3.5–5.2)
SODIUM BLD-SCNC: 135 MMOL/L (ref 136–145)

## 2017-02-19 PROCEDURE — 94799 UNLISTED PULMONARY SVC/PX: CPT

## 2017-02-19 PROCEDURE — 97110 THERAPEUTIC EXERCISES: CPT

## 2017-02-19 PROCEDURE — 80048 BASIC METABOLIC PNL TOTAL CA: CPT | Performed by: INTERNAL MEDICINE

## 2017-02-19 PROCEDURE — 25010000003 POTASSIUM CHLORIDE 10 MEQ/100ML SOLUTION: Performed by: INTERNAL MEDICINE

## 2017-02-19 PROCEDURE — 83735 ASSAY OF MAGNESIUM: CPT | Performed by: INTERNAL MEDICINE

## 2017-02-19 RX ORDER — POTASSIUM CHLORIDE 7.45 MG/ML
10 INJECTION INTRAVENOUS
Status: COMPLETED | OUTPATIENT
Start: 2017-02-19 | End: 2017-02-19

## 2017-02-19 RX ORDER — SENNA AND DOCUSATE SODIUM 50; 8.6 MG/1; MG/1
2 TABLET, FILM COATED ORAL 2 TIMES DAILY
Status: DISCONTINUED | OUTPATIENT
Start: 2017-02-19 | End: 2017-02-23 | Stop reason: HOSPADM

## 2017-02-19 RX ADMIN — TRIAMTERENE AND HYDROCHLOROTHIAZIDE 1 CAPSULE: 37.5; 25 CAPSULE ORAL at 08:55

## 2017-02-19 RX ADMIN — POLYETHYLENE GLYCOL 3350 17 G: 17 POWDER, FOR SOLUTION ORAL at 08:54

## 2017-02-19 RX ADMIN — ACETAMINOPHEN 650 MG: 325 TABLET ORAL at 05:55

## 2017-02-19 RX ADMIN — POTASSIUM CHLORIDE 10 MEQ: 7.46 INJECTION, SOLUTION INTRAVENOUS at 15:40

## 2017-02-19 RX ADMIN — POTASSIUM CHLORIDE 10 MEQ: 7.46 INJECTION, SOLUTION INTRAVENOUS at 14:16

## 2017-02-19 RX ADMIN — BISACODYL 10 MG: 10 SUPPOSITORY RECTAL at 15:40

## 2017-02-19 RX ADMIN — TERAZOSIN HYDROCHLORIDE 5 MG: 5 CAPSULE ORAL at 08:55

## 2017-02-19 RX ADMIN — MAGNESIUM HYDROXIDE 10 ML: 2400 SUSPENSION ORAL at 04:52

## 2017-02-19 RX ADMIN — DOCUSATE SODIUM -SENNOSIDES 2 TABLET: 50; 8.6 TABLET, COATED ORAL at 17:59

## 2017-02-19 RX ADMIN — POTASSIUM CHLORIDE 10 MEQ: 7.46 INJECTION, SOLUTION INTRAVENOUS at 18:00

## 2017-02-19 RX ADMIN — DOCUSATE SODIUM -SENNOSIDES 1 TABLET: 50; 8.6 TABLET, COATED ORAL at 08:54

## 2017-02-19 RX ADMIN — CYCLOBENZAPRINE HYDROCHLORIDE 10 MG: 10 TABLET, FILM COATED ORAL at 05:55

## 2017-02-19 RX ADMIN — TEMAZEPAM 15 MG: 15 CAPSULE ORAL at 21:03

## 2017-02-19 RX ADMIN — SODIUM CHLORIDE 100 ML/HR: 9 INJECTION, SOLUTION INTRAVENOUS at 22:28

## 2017-02-19 RX ADMIN — POTASSIUM CHLORIDE 10 MEQ: 7.46 INJECTION, SOLUTION INTRAVENOUS at 11:14

## 2017-02-19 RX ADMIN — POLYETHYLENE GLYCOL 3350 17 G: 17 POWDER, FOR SOLUTION ORAL at 17:59

## 2017-02-19 RX ADMIN — AMLODIPINE BESYLATE 10 MG: 10 TABLET ORAL at 08:55

## 2017-02-20 LAB — POTASSIUM BLD-SCNC: 4.4 MMOL/L (ref 3.5–5.2)

## 2017-02-20 PROCEDURE — 99024 POSTOP FOLLOW-UP VISIT: CPT | Performed by: ORTHOPAEDIC SURGERY

## 2017-02-20 PROCEDURE — 97110 THERAPEUTIC EXERCISES: CPT

## 2017-02-20 PROCEDURE — 84132 ASSAY OF SERUM POTASSIUM: CPT | Performed by: INTERNAL MEDICINE

## 2017-02-20 RX ADMIN — DOCUSATE SODIUM -SENNOSIDES 2 TABLET: 50; 8.6 TABLET, COATED ORAL at 18:21

## 2017-02-20 RX ADMIN — POLYETHYLENE GLYCOL 3350 17 G: 17 POWDER, FOR SOLUTION ORAL at 18:21

## 2017-02-20 RX ADMIN — TRIAMTERENE AND HYDROCHLOROTHIAZIDE 1 CAPSULE: 37.5; 25 CAPSULE ORAL at 09:31

## 2017-02-20 RX ADMIN — DOCUSATE SODIUM -SENNOSIDES 2 TABLET: 50; 8.6 TABLET, COATED ORAL at 09:29

## 2017-02-20 RX ADMIN — BISACODYL 10 MG: 10 SUPPOSITORY RECTAL at 16:09

## 2017-02-20 RX ADMIN — TERAZOSIN HYDROCHLORIDE 5 MG: 5 CAPSULE ORAL at 09:29

## 2017-02-20 RX ADMIN — AMLODIPINE BESYLATE 10 MG: 10 TABLET ORAL at 09:31

## 2017-02-20 RX ADMIN — POLYETHYLENE GLYCOL 3350 17 G: 17 POWDER, FOR SOLUTION ORAL at 09:30

## 2017-02-21 ENCOUNTER — APPOINTMENT (OUTPATIENT)
Dept: CT IMAGING | Facility: HOSPITAL | Age: 72
End: 2017-02-21
Attending: INTERNAL MEDICINE

## 2017-02-21 LAB
ANION GAP SERPL CALCULATED.3IONS-SCNC: 12.9 MMOL/L
ANION GAP SERPL CALCULATED.3IONS-SCNC: 13.8 MMOL/L
BUN BLD-MCNC: 21 MG/DL (ref 8–23)
BUN BLD-MCNC: 29 MG/DL (ref 8–23)
BUN/CREAT SERPL: 15 (ref 7–25)
BUN/CREAT SERPL: 17.1 (ref 7–25)
CALCIUM SPEC-SCNC: 8.6 MG/DL (ref 8.6–10.5)
CALCIUM SPEC-SCNC: 8.6 MG/DL (ref 8.6–10.5)
CHLORIDE SERPL-SCNC: 100 MMOL/L (ref 98–107)
CHLORIDE SERPL-SCNC: 99 MMOL/L (ref 98–107)
CO2 SERPL-SCNC: 24.2 MMOL/L (ref 22–29)
CO2 SERPL-SCNC: 26.1 MMOL/L (ref 22–29)
CREAT BLD-MCNC: 1.23 MG/DL (ref 0.76–1.27)
CREAT BLD-MCNC: 1.93 MG/DL (ref 0.76–1.27)
GFR SERPL CREATININE-BSD FRML MDRD: 34 ML/MIN/1.73
GFR SERPL CREATININE-BSD FRML MDRD: 58 ML/MIN/1.73
GLUCOSE BLD-MCNC: 100 MG/DL (ref 65–99)
GLUCOSE BLD-MCNC: 140 MG/DL (ref 65–99)
MAGNESIUM SERPL-MCNC: 2.5 MG/DL (ref 1.6–2.4)
PHOSPHATE SERPL-MCNC: 3.4 MG/DL (ref 2.5–4.5)
POTASSIUM BLD-SCNC: 3.7 MMOL/L (ref 3.5–5.2)
POTASSIUM BLD-SCNC: 3.8 MMOL/L (ref 3.5–5.2)
SODIUM BLD-SCNC: 137 MMOL/L (ref 136–145)
SODIUM BLD-SCNC: 139 MMOL/L (ref 136–145)

## 2017-02-21 PROCEDURE — 84100 ASSAY OF PHOSPHORUS: CPT | Performed by: INTERNAL MEDICINE

## 2017-02-21 PROCEDURE — 99024 POSTOP FOLLOW-UP VISIT: CPT | Performed by: ORTHOPAEDIC SURGERY

## 2017-02-21 PROCEDURE — 80048 BASIC METABOLIC PNL TOTAL CA: CPT | Performed by: INTERNAL MEDICINE

## 2017-02-21 PROCEDURE — 74176 CT ABD & PELVIS W/O CONTRAST: CPT

## 2017-02-21 PROCEDURE — 83735 ASSAY OF MAGNESIUM: CPT | Performed by: INTERNAL MEDICINE

## 2017-02-21 PROCEDURE — 97110 THERAPEUTIC EXERCISES: CPT

## 2017-02-21 RX ADMIN — AMLODIPINE BESYLATE 10 MG: 10 TABLET ORAL at 06:57

## 2017-02-21 RX ADMIN — SODIUM CHLORIDE 125 ML/HR: 9 INJECTION, SOLUTION INTRAVENOUS at 17:35

## 2017-02-21 RX ADMIN — SODIUM CHLORIDE 125 ML/HR: 9 INJECTION, SOLUTION INTRAVENOUS at 10:36

## 2017-02-21 RX ADMIN — DOCUSATE SODIUM -SENNOSIDES 2 TABLET: 50; 8.6 TABLET, COATED ORAL at 09:04

## 2017-02-21 RX ADMIN — BISACODYL 10 MG: 10 SUPPOSITORY RECTAL at 12:09

## 2017-02-21 RX ADMIN — ACETAMINOPHEN 650 MG: 325 TABLET ORAL at 21:26

## 2017-02-21 RX ADMIN — TEMAZEPAM 15 MG: 15 CAPSULE ORAL at 00:18

## 2017-02-21 RX ADMIN — OXYCODONE HYDROCHLORIDE AND ACETAMINOPHEN 2 TABLET: 5; 325 TABLET ORAL at 09:04

## 2017-02-21 RX ADMIN — TRIAMTERENE AND HYDROCHLOROTHIAZIDE 1 CAPSULE: 37.5; 25 CAPSULE ORAL at 06:57

## 2017-02-21 RX ADMIN — TERAZOSIN HYDROCHLORIDE 5 MG: 5 CAPSULE ORAL at 06:57

## 2017-02-21 RX ADMIN — MAGNESIUM HYDROXIDE 10 ML: 2400 SUSPENSION ORAL at 05:19

## 2017-02-21 RX ADMIN — DOCUSATE SODIUM -SENNOSIDES 2 TABLET: 50; 8.6 TABLET, COATED ORAL at 17:35

## 2017-02-21 RX ADMIN — TEMAZEPAM 15 MG: 15 CAPSULE ORAL at 21:26

## 2017-02-21 RX ADMIN — POLYETHYLENE GLYCOL 3350 17 G: 17 POWDER, FOR SOLUTION ORAL at 17:35

## 2017-02-21 RX ADMIN — POLYETHYLENE GLYCOL 3350 17 G: 17 POWDER, FOR SOLUTION ORAL at 09:04

## 2017-02-22 LAB
ANION GAP SERPL CALCULATED.3IONS-SCNC: 12.5 MMOL/L
BUN BLD-MCNC: 16 MG/DL (ref 8–23)
BUN/CREAT SERPL: 17.4 (ref 7–25)
CALCIUM SPEC-SCNC: 8.5 MG/DL (ref 8.6–10.5)
CHLORIDE SERPL-SCNC: 101 MMOL/L (ref 98–107)
CO2 SERPL-SCNC: 24.5 MMOL/L (ref 22–29)
CREAT BLD-MCNC: 0.92 MG/DL (ref 0.76–1.27)
GFR SERPL CREATININE-BSD FRML MDRD: 81 ML/MIN/1.73
GLUCOSE BLD-MCNC: 90 MG/DL (ref 65–99)
POTASSIUM BLD-SCNC: 4 MMOL/L (ref 3.5–5.2)
SODIUM BLD-SCNC: 138 MMOL/L (ref 136–145)

## 2017-02-22 PROCEDURE — 80048 BASIC METABOLIC PNL TOTAL CA: CPT | Performed by: INTERNAL MEDICINE

## 2017-02-22 PROCEDURE — 99024 POSTOP FOLLOW-UP VISIT: CPT | Performed by: ORTHOPAEDIC SURGERY

## 2017-02-22 RX ADMIN — ACETAMINOPHEN 325 MG: 325 TABLET ORAL at 03:54

## 2017-02-22 RX ADMIN — AMLODIPINE BESYLATE 10 MG: 10 TABLET ORAL at 06:43

## 2017-02-22 RX ADMIN — OXYCODONE HYDROCHLORIDE AND ACETAMINOPHEN 1 TABLET: 5; 325 TABLET ORAL at 03:54

## 2017-02-22 RX ADMIN — TRIAMTERENE AND HYDROCHLOROTHIAZIDE 1 CAPSULE: 37.5; 25 CAPSULE ORAL at 06:43

## 2017-02-22 RX ADMIN — OXYCODONE HYDROCHLORIDE AND ACETAMINOPHEN 1 TABLET: 5; 325 TABLET ORAL at 08:50

## 2017-02-22 RX ADMIN — ACETAMINOPHEN 650 MG: 325 TABLET ORAL at 21:04

## 2017-02-22 RX ADMIN — TEMAZEPAM 15 MG: 15 CAPSULE ORAL at 21:04

## 2017-02-22 RX ADMIN — TERAZOSIN HYDROCHLORIDE 5 MG: 5 CAPSULE ORAL at 06:43

## 2017-02-22 RX ADMIN — POLYETHYLENE GLYCOL 3350 17 G: 17 POWDER, FOR SOLUTION ORAL at 18:02

## 2017-02-22 RX ADMIN — DOCUSATE SODIUM -SENNOSIDES 2 TABLET: 50; 8.6 TABLET, COATED ORAL at 18:02

## 2017-02-23 VITALS
DIASTOLIC BLOOD PRESSURE: 73 MMHG | SYSTOLIC BLOOD PRESSURE: 114 MMHG | TEMPERATURE: 98.4 F | OXYGEN SATURATION: 94 % | HEART RATE: 67 BPM | RESPIRATION RATE: 18 BRPM | BODY MASS INDEX: 25.33 KG/M2 | HEIGHT: 69 IN | WEIGHT: 171 LBS

## 2017-02-23 PROBLEM — N40.1 BENIGN PROSTATIC HYPERPLASIA WITH LOWER URINARY TRACT SYMPTOMS: Status: ACTIVE | Noted: 2017-02-23

## 2017-02-23 PROCEDURE — 99024 POSTOP FOLLOW-UP VISIT: CPT | Performed by: ORTHOPAEDIC SURGERY

## 2017-02-23 RX ADMIN — TERAZOSIN HYDROCHLORIDE 5 MG: 5 CAPSULE ORAL at 06:59

## 2017-02-23 RX ADMIN — TRIAMTERENE AND HYDROCHLOROTHIAZIDE 1 CAPSULE: 37.5; 25 CAPSULE ORAL at 06:59

## 2017-02-23 RX ADMIN — AMLODIPINE BESYLATE 10 MG: 10 TABLET ORAL at 06:59

## 2017-02-23 RX ADMIN — ACETAMINOPHEN 650 MG: 325 TABLET ORAL at 06:14

## 2017-03-15 ENCOUNTER — TELEPHONE (OUTPATIENT)
Dept: ORTHOPEDIC SURGERY | Facility: CLINIC | Age: 72
End: 2017-03-15

## 2017-03-16 NOTE — TELEPHONE ENCOUNTER
Call return to the patient.  He is still having problems with urinary retention and is following up with urology.  They've attempted to remove his Pettit once or twice with no ability to avoid any still has the Pettit in place.  His developed some pneumonia and is being treated for that.  Patient states that his back is doing fine incision is well-healed having no increased pain and is able to ambulate without much difficulty.  He was notifying us of the situation since he has not been able to return to the office for follow-up.  Will call to make an appointment soon as he feels well enough to travel

## 2017-03-17 NOTE — TELEPHONE ENCOUNTER
That's fine.  Let him get these problems sorted out first and I can see him back in a month or to even

## 2017-03-23 ENCOUNTER — TELEPHONE (OUTPATIENT)
Dept: ORTHOPEDIC SURGERY | Facility: CLINIC | Age: 72
End: 2017-03-23

## 2017-03-23 NOTE — TELEPHONE ENCOUNTER
I received a call from ghulam dhaliwal in Dr. Reza's office (urology).  They have been seeing the patient for his postop urinary retention.  They have attempted to remove the Pettit 4 different times since the patient was discharged from the hospital however the patient has been unable to void on his own and a Pettit was replaced again today.  And to need to have a TURP procedure done possibly next Wednesday.   Dr. Reza asked if it was okay for the patient to be put in a dorsal lithotomy position for an hour and hour and a half.  Will check with PRESTON KELLY and then get back with their office

## 2017-03-24 ENCOUNTER — TELEPHONE (OUTPATIENT)
Dept: ORTHOPEDIC SURGERY | Facility: CLINIC | Age: 72
End: 2017-03-24

## 2017-03-24 NOTE — TELEPHONE ENCOUNTER
Have discussed with Dr. Mcneil.  OK for patient to proceed with TURP and to be in the dorsal lithotomy position.   Have faxed notes to Dr. Reza office at [phone]

## 2017-03-24 NOTE — TELEPHONE ENCOUNTER
Call her back and let her know that I have talked to Dr. Reza's office and NESSA is OK with proceeding with the surgery. AMB

## 2017-04-28 ENCOUNTER — OFFICE VISIT (OUTPATIENT)
Dept: ORTHOPEDIC SURGERY | Facility: CLINIC | Age: 72
End: 2017-04-28

## 2017-04-28 VITALS — TEMPERATURE: 98.3 F | HEIGHT: 68 IN | WEIGHT: 179.8 LBS | BODY MASS INDEX: 27.25 KG/M2

## 2017-04-28 DIAGNOSIS — Z98.1 S/P LUMBAR SPINAL FUSION: Primary | ICD-10-CM

## 2017-04-28 PROCEDURE — 99024 POSTOP FOLLOW-UP VISIT: CPT | Performed by: ORTHOPAEDIC SURGERY

## 2017-04-28 PROCEDURE — 72100 X-RAY EXAM L-S SPINE 2/3 VWS: CPT | Performed by: ORTHOPAEDIC SURGERY

## 2017-04-28 RX ORDER — AMLODIPINE BESYLATE 10 MG/1
10 TABLET ORAL EVERY MORNING
COMMUNITY

## 2017-04-28 NOTE — PROGRESS NOTES
Back pain is improved.  No significant leg pain.  Two-view x-rays of the lumbar spine obtained to evaluate hardware and  fusion bone suggest further healing since prior x-ray.  We will need an AP and lateral lumbar x-ray on return visit.  Instructions were given.

## 2017-06-28 ENCOUNTER — OFFICE VISIT (OUTPATIENT)
Dept: ORTHOPEDIC SURGERY | Facility: CLINIC | Age: 72
End: 2017-06-28

## 2017-06-28 VITALS — TEMPERATURE: 97.6 F | BODY MASS INDEX: 26.98 KG/M2 | WEIGHT: 178 LBS | HEIGHT: 68 IN

## 2017-06-28 DIAGNOSIS — M48.061 LUMBAR SPINAL STENOSIS: Primary | ICD-10-CM

## 2017-06-28 DIAGNOSIS — M16.0 PRIMARY OSTEOARTHRITIS OF BOTH HIPS: ICD-10-CM

## 2017-06-28 PROCEDURE — 72100 X-RAY EXAM L-S SPINE 2/3 VWS: CPT | Performed by: ORTHOPAEDIC SURGERY

## 2017-06-28 PROCEDURE — 99213 OFFICE O/P EST LOW 20 MIN: CPT | Performed by: ORTHOPAEDIC SURGERY

## 2017-06-28 RX ORDER — NABUMETONE 750 MG/1
750 TABLET, FILM COATED ORAL EVERY MORNING
Qty: 180 TABLET | Refills: 1 | COMMUNITY
End: 2018-03-08

## 2017-06-28 NOTE — PROGRESS NOTES
He has only occasional left hip pain and groin pain but no leg pain no significant back pain.  He is now 4 months out from extension decompression and fusion from which she is done well.  On exam good strength in the lower extremity and sensations intact bilaterally.  He does have an equivocal Stinchfield test and indeed has hip arthritis left worse than right which I suspect is symptomatic.  Two-view lumbar x-ray shows excellent healing of the fusion bone which I think is solid at this point compared to prior films.  Additionally demonstrates the arthritis mentioned above.  I recommended Relafen 750 by mouth twice a day and the told him I be happy to get him in touch with one of our hip surgeons if the neck causes some further distress.  He gave me a gift of some excellent barbecue sauce.

## 2018-02-16 ENCOUNTER — CONSULT (OUTPATIENT)
Dept: ORTHOPEDIC SURGERY | Facility: CLINIC | Age: 73
End: 2018-02-16

## 2018-02-16 VITALS — HEIGHT: 68 IN | BODY MASS INDEX: 27.19 KG/M2 | WEIGHT: 179.4 LBS | TEMPERATURE: 98.4 F

## 2018-02-16 DIAGNOSIS — M16.12 PRIMARY OSTEOARTHRITIS OF LEFT HIP: ICD-10-CM

## 2018-02-16 DIAGNOSIS — M25.552 HIP PAIN, BILATERAL: Primary | ICD-10-CM

## 2018-02-16 DIAGNOSIS — M25.551 HIP PAIN, BILATERAL: Primary | ICD-10-CM

## 2018-02-16 PROCEDURE — 73521 X-RAY EXAM HIPS BI 2 VIEWS: CPT | Performed by: ORTHOPAEDIC SURGERY

## 2018-02-16 PROCEDURE — 99214 OFFICE O/P EST MOD 30 MIN: CPT | Performed by: ORTHOPAEDIC SURGERY

## 2018-02-16 RX ORDER — MELOXICAM 7.5 MG/1
15 TABLET ORAL ONCE
Status: CANCELLED | OUTPATIENT
Start: 2018-03-16 | End: 2018-03-16

## 2018-02-16 RX ORDER — CEFAZOLIN SODIUM 2 G/100ML
2 INJECTION, SOLUTION INTRAVENOUS ONCE
Status: CANCELLED | OUTPATIENT
Start: 2018-03-16 | End: 2018-03-16

## 2018-02-16 RX ORDER — PREGABALIN 25 MG/1
150 CAPSULE ORAL ONCE
Status: CANCELLED | OUTPATIENT
Start: 2018-03-16 | End: 2018-03-16

## 2018-02-16 NOTE — PROGRESS NOTES
"Patient: Coleman Bear  YOB: 1945 72 y.o. male  Medical Record Number: [id number]    Chief Complaints:   Chief Complaint   Patient presents with   • Left Hip - Establish Care, Pain       History of Present Illness:Coleman Bear is a 72 y.o. male who presents with  The point of severe left hip and groin pain. It is markedly worsened over the last 3 to 6 months. At this point he has limitations of basic activities of daily living. He is tried anti-inflammatories which do not give him sufficient relief. He can only walk short distances due to the pain.He has problems putting shoes and socks on or gettingg in out of car    Allergies: No Known Allergies    Medications:   Current Outpatient Prescriptions   Medication Sig Dispense Refill   • amLODIPine (NORVASC) 10 MG tablet Take  by mouth.     • nabumetone (RELAFEN) 750 MG tablet Take 1 tablet by mouth Daily. Sig:  Two pills by mouth once daily 180 tablet 1   • terazosin (HYTRIN) 5 MG capsule Take 5 mg by mouth Every Morning.     • triamterene-hydrochlorothiazide (DYAZIDE) 37.5-25 MG per capsule Take 1 capsule by mouth every morning.       No current facility-administered medications for this visit.          The following portions of the patient's history were reviewed and updated as appropriate: allergies, current medications, past family history, past medical history, past social history, past surgical history and problem list.    Review of Systems:   A 14 point review of systems was performed. All systems negative except pertinent positives/negative listed in HPI above    Physical Exam:   Vitals:    02/16/18 1442   Temp: 98.4 °F (36.9 °C)   TempSrc: Temporal Artery    Weight: 81.4 kg (179 lb 6.4 oz)   Height: 172.7 cm (68\")       General: A and O x 3, ASA, NAD    SCLERA:    Normal    DENTITION:   Normal  Hip:  left    LEG ALIGNMENT:     Neutral        LEG LENGTH DISCREPANCY   :    none    GAIT:     Antalgic    SKIN:     No abnormality    RANGE OF " MOTION:     Limited by joint irritability    STRENGTH:     Limited by joint irratibility    DISTAL PULSES:    Paplable    DISTAL SENSATION :   Intact    LYMPHATICS:     No   lymphadenopathy    OTHER:          +   Stinchfeld test      -    log roll      -   Tenderness to palpation trochanteric bursa        Radiology:  Xrays 2views LEFT HIP  (AP bilateral hips, and lateral of the hip) ordered and reviewed for evaluation of hip pain  demonstrating  advanced, end-satge osteoarthritis with bone on bone articluation, periarticular osteophytes, and subchondral cysts .  There are no previous films for comparision.    Assessment/Plan: Left Hip end stage OA. Failed injection and NSAIDS.  Continuation of conservative management vs. DHRUV discussed.  The patient wishes to proceed with total hip replacement.  At this point the patient has failed the full gamut of conservative treatment and stating complete understanding of the risks/benefits/ anternatives wishes to proceed with surgical treatment.    Risk and benefits of surgery were reviewed.  Including, but not limited to, blood clots, anesthesia risk, infection, leg length discrepancy, fracture, skin/leg numbness, failure of the implant, need for future surgeries, continued pain, hematoma, need for transfusion, and death, among others.  The patient understands and wishes to proceed.     The spectrum of treatment options were discussed with the patient in detail including both the nonoperative and operative treatment modalities and their respective risks and benefits.  After thorough discussion, the patient has elected to undergo surgical treatment.  The details of the surgical procedure were explained including the location of probable incisions and a description of the likely implants to be used.  Models and diagrams were used as educational resources. The patient understands the likely convalescence after surgery, as well as the rehabilitation required.  We thoroughly  discussed the risks, benefits, and alternatives to surgery.  The risks include but are not limited to the risk of infection, joint stiffness, blood clots (including DVT and/or pulmonary embolus along with the risk of death), neurologic and/or vascular injury, fracture, dislocation, nonunion, malunion, need for further surgery including hardware failure requiring revision, and continued pain.  It was explained that if tissue has been repaired or reconstructed, there is also a chance of failure which may require further management.  Following the completion of the discussion, the patient expressed understanding of this planned course of care, all their questions were answered and consent will be obtained preoperatively.    Operative Plan: Left  Posterior approach Total Hip Replacement an overnight staywith home health rehab          Kris Pal MD  2/16/2018

## 2018-02-20 PROBLEM — M16.12 PRIMARY OSTEOARTHRITIS OF LEFT HIP: Status: ACTIVE | Noted: 2018-02-20

## 2018-03-02 NOTE — PROGRESS NOTES
Pre-Operative Orthopedic Assessment      Patient: Coleman Bear    YOB: 1945      Age/Gender: 72 y.o. male  Medical Record Number: [id number]  Surgical Procedure Planned: TOTAL HIP ARTHROPLASTY     Surgeon: Kris Pal MD    Date of Surgery Planned: 03/16/2018    Question Value Score    Patient Score   1. What is your age group? 50-65 years  66-75 years  >75 years =2  =1  =0 1   2. Gender? Male  Female =2  =1 2   3. How far on average can you walk? (a block is 200 meters) Two blocks or more (+\- rest)  1-2 blocks (+\- rest)  Housebound (most of time) =2  =1  =0 0   4. Which gait aid to you use? (more often than not) None  Single-Point Stick  Crutches/Frame =2  =1  =0 2   5. Do you use community  supports? (home help, meals on wheels, district nursing) None or one per week  Two or more per week =1  =0 1   6. Will you live with someone who can care for you after your operation? Yes  No =3  =0 3      Your Score (out of 12)  9     Key Destination at Discharge from acute care predicted by score   Scores < 6  -- extended inpatient rehabilitation   Scores 6-9 -- additional intervention to discharge directly home (Rehabilitation in home)   Scores > 9 -- directly home         Discharge Disposition/Planning:     Patient Response   Discussed the Predicted discharge disposition needed based on RAPT Assessment with the patient.    yes   Patient selected discharge disposition:   home   Out Patient Rehabilitation Facility of Choice:    none   Home Health Services Preferred:   [facility]   Has the patient completed or been scheduled to complete pre-operative testing? yes   Post-Operative Care Giver Name and Phone Number:    Spouse Amaya  [phone]     Subacute Inpatient Rehabilitation:  Complete this section only if planning inpatient services at a Subacute Facility     Patient Response   Subacute Facility Preferred (Please list 2 facilities:      Requires  pre-certification for inpatient rehabilitation services?         Planned source of transportation to inpatient rehabilitation facility?       If choosing inpatient services at an Acute or Subacute Facility please list a subsequent back-up plan (in case patient fails to qualify for inpatient rehabilitation). Back-up plans should include caregiver (family member or friend) for first 24-48 post- -operatively.       Home Equipment Patient Response   Does patient have a walker for home use?    no   Does patient have a 3 in 1 commode for home use?    yes   Does patient have a shower chair for home use?    no   Does patient have an elevated commode seat for home use? 3 in 1   Does patient have a cane for home use?    no   Is there any other medical equipment in the home? If so,  List in comment section below      Pre-Operative Class Attendance Patient Response   Attended or scheduled to attend the pre-operative class within 1 year of total joint replacement? yes   Not planning to attend the pre-operative class (see comments below)    scheduled   Patient Education  Completed   Expected time of discharge discussed yes   Encouraged to attend Pre-Operative Class    Yes   Education re: Back-up plan for patients planning discharge to subacute facilities n/a   Education re: Predicted Discharge Disposition based on RAPT score    yes   Patient receptive and voiced understanding of information given    yes                                                                                                            Comments:    Address verified.  Patient resides with spouse in a single story home with 2 steps to enter.  Spouse will be available to assist.  Patient used [facility] after back surgery and plans to use [facility] at [place].                                         Signature: Adenike Jara RN    Date:  3/2/2018

## 2018-03-08 ENCOUNTER — APPOINTMENT (OUTPATIENT)
Dept: PREADMISSION TESTING | Facility: HOSPITAL | Age: 73
End: 2018-03-08

## 2018-03-08 VITALS
HEART RATE: 75 BPM | DIASTOLIC BLOOD PRESSURE: 80 MMHG | SYSTOLIC BLOOD PRESSURE: 128 MMHG | TEMPERATURE: 97.5 F | BODY MASS INDEX: 27.11 KG/M2 | RESPIRATION RATE: 20 BRPM | OXYGEN SATURATION: 95 % | WEIGHT: 183 LBS | HEIGHT: 69 IN

## 2018-03-08 DIAGNOSIS — M16.12 PRIMARY OSTEOARTHRITIS OF LEFT HIP: ICD-10-CM

## 2018-03-08 LAB
ANION GAP SERPL CALCULATED.3IONS-SCNC: 17.4 MMOL/L
BILIRUB UR QL STRIP: NEGATIVE
BUN BLD-MCNC: 12 MG/DL (ref 8–23)
BUN/CREAT SERPL: 11.2 (ref 7–25)
CALCIUM SPEC-SCNC: 9.6 MG/DL (ref 8.6–10.5)
CHLORIDE SERPL-SCNC: 97 MMOL/L (ref 98–107)
CLARITY UR: CLEAR
CO2 SERPL-SCNC: 23.6 MMOL/L (ref 22–29)
COLOR UR: YELLOW
CREAT BLD-MCNC: 1.07 MG/DL (ref 0.76–1.27)
DEPRECATED RDW RBC AUTO: 41 FL (ref 37–54)
ERYTHROCYTE [DISTWIDTH] IN BLOOD BY AUTOMATED COUNT: 12.8 % (ref 11.5–14.5)
GFR SERPL CREATININE-BSD FRML MDRD: 68 ML/MIN/1.73
GLUCOSE BLD-MCNC: 173 MG/DL (ref 65–99)
GLUCOSE UR STRIP-MCNC: NEGATIVE MG/DL
HCT VFR BLD AUTO: 44.4 % (ref 40.4–52.2)
HGB BLD-MCNC: 15.2 G/DL (ref 13.7–17.6)
HGB UR QL STRIP.AUTO: NEGATIVE
KETONES UR QL STRIP: NEGATIVE
LEUKOCYTE ESTERASE UR QL STRIP.AUTO: NEGATIVE
MCH RBC QN AUTO: 29.8 PG (ref 27–32.7)
MCHC RBC AUTO-ENTMCNC: 34.2 G/DL (ref 32.6–36.4)
MCV RBC AUTO: 87.1 FL (ref 79.8–96.2)
NITRITE UR QL STRIP: NEGATIVE
PH UR STRIP.AUTO: 6.5 [PH] (ref 5–8)
PLATELET # BLD AUTO: 223 10*3/MM3 (ref 140–500)
PMV BLD AUTO: 11.3 FL (ref 6–12)
POTASSIUM BLD-SCNC: 3.3 MMOL/L (ref 3.5–5.2)
PROT UR QL STRIP: NEGATIVE
RBC # BLD AUTO: 5.1 10*6/MM3 (ref 4.6–6)
SODIUM BLD-SCNC: 138 MMOL/L (ref 136–145)
SP GR UR STRIP: 1.02 (ref 1–1.03)
UROBILINOGEN UR QL STRIP: NORMAL
WBC NRBC COR # BLD: 8.5 10*3/MM3 (ref 4.5–10.7)

## 2018-03-08 PROCEDURE — 85027 COMPLETE CBC AUTOMATED: CPT | Performed by: ORTHOPAEDIC SURGERY

## 2018-03-08 PROCEDURE — 81003 URINALYSIS AUTO W/O SCOPE: CPT | Performed by: ORTHOPAEDIC SURGERY

## 2018-03-08 PROCEDURE — 36415 COLL VENOUS BLD VENIPUNCTURE: CPT

## 2018-03-08 PROCEDURE — 93005 ELECTROCARDIOGRAM TRACING: CPT

## 2018-03-08 PROCEDURE — 93010 ELECTROCARDIOGRAM REPORT: CPT | Performed by: INTERNAL MEDICINE

## 2018-03-08 PROCEDURE — 80048 BASIC METABOLIC PNL TOTAL CA: CPT | Performed by: ORTHOPAEDIC SURGERY

## 2018-03-08 RX ORDER — ATORVASTATIN CALCIUM 20 MG/1
20 TABLET, FILM COATED ORAL EVERY MORNING
COMMUNITY

## 2018-03-08 ASSESSMENT — HOOS JR
HOOS JR SCORE: 13
HOOS JR SCORE: 49.858

## 2018-03-08 NOTE — DISCHARGE INSTRUCTIONS
Take the following medications the morning of surgery with a small sip of water:  amlodipine        General Instructions:  • Do not eat solid food after midnight the night before surgery.  • You may drink clear liquids day of surgery but must stop at least one hour before your hospital arrival time.  • It is beneficial for you to have a clear drink that contains carbohydrates the day of surgery.  We suggest a 12 to 20 ounce bottle of Gatorade or Powerade for non-diabetic patients or a 12 to 20 ounce bottle of G2 or Powerade Zero for diabetic patients. (Pediatric patients, are not advised to drink a 12 to 20 ounce carbohydrate drink)    Clear liquids are liquids you can see through.  Nothing red in color.     Plain water                               Sports drinks  Sodas                                   Gelatin (Jell-O)  Fruit juices without pulp such as white grape juice and apple juice  Popsicles that contain no fruit or yogurt  Tea or coffee (no cream or milk added)  Gatorade / Powerade  G2 / Powerade Zero    • Infants may have breast milk up to four hours before surgery.  • Infants drinking formula may drink formula up to six hours before surgery.   • Patients who avoid smoking, chewing tobacco and alcohol for 4 weeks prior to surgery have a reduced risk of post-operative complications.  Quit smoking as many days before surgery as you can.  • Do not smoke, use chewing tobacco or drink alcohol the day of surgery.   • If applicable bring your C-PAP/ BI-PAP machine.  • Bring any papers given to you in the doctor’s office.  • Wear clean comfortable clothes and socks.  • Do not wear contact lenses or make-up.  Bring a case for your glasses.   • Bring crutches or walker if applicable.  • Remove all piercings.  Leave jewelry and any other valuables at home.  • Hair extensions with metal clips must be removed prior to surgery.  • The Pre-Admission Testing nurse will instruct you to bring medications if unable to obtain  an accurate list in Pre-Admission Testing.        If you were given a blood bank ID arm band remember to bring it with you the day of surgery.    Preventing a Surgical Site Infection:  • For 2 to 3 days before surgery, avoid shaving with a razor because the razor can irritate skin and make it easier to develop an infection.  • The night prior to surgery sleep in a clean bed with clean clothing.  Do not allow pets to sleep with you.  • Shower on the morning of surgery using a fresh bar of anti-bacterial soap (such as Dial) and clean washcloth.  Dry with a clean towel and dress in clean clothing.  • Ask your surgeon if you will be receiving antibiotics prior to surgery.  • Make sure you, your family, and all healthcare providers clean their hands with soap and water or an alcohol based hand  before caring for you or your wound.    Day of surgery:  Upon arrival, a Pre-op nurse and Anesthesiologist will review your health history, obtain vital signs, and answer questions you may have.  The only belongings needed at this time will be your home medications and if applicable your C-PAP/BI-PAP machine.  If you are staying overnight your family can leave the rest of your belongings in the car and bring them to your room later.  A Pre-op nurse will start an IV and you may receive medication in preparation for surgery, including something to help you relax.  Your family will be able to see you in the Pre-op area.  While you are in surgery your family should notify the waiting room  if they leave the waiting room area and provide a contact phone number.    Please be aware that surgery does come with discomfort.  We want to make every effort to control your discomfort so please discuss any uncontrolled symptoms with your nurse.   Your doctor will most likely have prescribed pain medications.      If you are going home after surgery you will receive individualized written care instructions before being  discharged.  A responsible adult must drive you to and from the hospital on the day of your surgery and stay with you for 24 hours.    If you are staying overnight following surgery, you will be transported to your hospital room following the recovery period.  [facility] has all private rooms.    If you have any questions please call Pre-Admission Testing at [phone].  Deductibles and co-payments are collected on the day of service. Please be prepared to pay the required co-pay, deductible or deposit on the day of service as defined by your plan.    2% CHLORAHEXIDINE GLUCONATE* CLOTH  Preparing or “prepping” skin before surgery can reduce the risk of infection at the surgical site. To make the process easier, [facility] has chosen disposable cloths moistened with a rinse-free, 2% Chlorhexidine Gluconate (CHG) antiseptic solution. The steps below outline the prepping process and should be carefully followed.        Use the prep cloth on the area that is circled in the diagram             Directions Night before Surgery  1) Shower using a fresh bar of anti-bacterial soap (such as Dial) and clean washcloth.  Use a clean towel to completely dry your skin.  2) Do not use any lotions, oils or creams on your skin.  3) Open the package and remove 1 cloth, wipe your skin for 30 seconds in a circular motion.  Allow to dry for 3 minutes.  4) Repeat #3 with second cloth.  5) Do not touch your eyes, ears, or mouth with the prep cloth.  6) Allow the wet prep solution to air dry.  7) Discard the prep cloth and wash your hands with soap and water.   8) Dress in clean bed clothes and sleep on fresh clean bed sheets.   9) You may experience some temporary itching after the prep.    Directions Day of Surgery  1) Repeat steps 1,2,3,4,5,6,7, and 9.   2) Dress in clean clothes before coming to the hospital.    BACTROBAN NASAL OINTMENT  There are many germs normally in your nose. Bactroban is an ointment that  will help reduce these germs. Please follow these instructions for Bactroban use:    ____Two days before surgery in the evening Date________    ____The day before surgery in the morning  Date________    ____The day before surgery in the evening              Date________    ____The day of surgery in the morning    Date________    **Squirt ½ package of Bactroban Ointment onto a cotton applicator and apply to inside of 1st nostril.  Squirt the remaining Bactroban and apply to the inside of the other nostril.    PERIDEX- ORAL:  Use only if your surgeon has ordered  Use the night before and morning of surgery - Swish, gargle, and spit - do not swallow.

## 2018-03-15 ENCOUNTER — OFFICE VISIT (OUTPATIENT)
Dept: ORTHOPEDIC SURGERY | Facility: CLINIC | Age: 73
End: 2018-03-15

## 2018-03-15 VITALS
SYSTOLIC BLOOD PRESSURE: 130 MMHG | BODY MASS INDEX: 26.51 KG/M2 | WEIGHT: 179 LBS | TEMPERATURE: 98.2 F | DIASTOLIC BLOOD PRESSURE: 78 MMHG | HEIGHT: 69 IN

## 2018-03-15 DIAGNOSIS — M16.12 PRIMARY OSTEOARTHRITIS OF LEFT HIP: Primary | ICD-10-CM

## 2018-03-15 PROCEDURE — S0260 H&P FOR SURGERY: HCPCS | Performed by: NURSE PRACTITIONER

## 2018-03-15 NOTE — H&P
Patient: Coleman Bear    Date of Admission: 3/15/18    YOB: 1945    Medical Record Number: [id number]    Admitting Physician: Dr. Kris Pal    Reason for Admission: End Stage Left Hip OA    History of Present Illness: 72 y.o. male presents with severe end stage hip osteoarthritis which has not been responsive to the full compliment of conservative measures. Despite conservative attempts, there is still severe, constant activity limiting hip pain. Given the severity of the pain, the patient has elected to proceed with hip replacement.    Allergies: No Known Allergies      Current Medications:  Scheduled Meds:  PRN Meds:.    PMH:  Past Medical History:   Diagnosis Date   • Arthritis    • BPH (benign prostatic hyperplasia)    • Chronic pain disorder    • High cholesterol    • HTN (hypertension)    • Low back pain    • Lumbar stenosis     ABOVE THE L3-5 FUSION   • Shoulder pain         PSURGH:  Past Surgical History:   Procedure Laterality Date   • CERVICAL DISC SURGERY     • HERNIA REPAIR      ing   • LUMBAR DISCECTOMY  07/24/1992    AND FUSION   • LUMBAR DISCECTOMY FUSION INSTRUMENTATION N/A 2/16/2017    Procedure: L2-L3 repeat laminectomy and fusion with instrumentation, removal instrumentation L3 to L5;  Surgeon: Cameron Mcneil MD;  Location: [facility];  Service:    • LUMBAR EPIDURAL INJECTION     • LUMBAR LAMINECTOMY WITH FUSION  08/24/2006   • PROSTATE SURGERY     • SHOULDER ARTHROSCOPY W/ SUBACROMIAL DECOMPRESSION AND DISTAL CLAVICLE EXCISION Right 03/04/2011    AND EXTENSIVE ROTATOR CUFF DEBRIDEMENT       SocialHx:  Social History     Occupational History   • Not on file.     Social History Main Topics   • Smoking status: Never Smoker   • Smokeless tobacco: Never Used   • Alcohol use No   • Drug use: No   • Sexual activity: Defer    Social History     Social History Narrative   • No narrative on file       FamHx:  Family History   Problem Relation Age of Onset   • Hypertension Other   "  • Malig Hyperthermia Neg Hx          Review of Systems:   A 14 point review of systems was performed, pertinent positives discussed above, all other systems are negative    Physical Exam: 72 y.o. male  Vital Signs :   Vitals:    03/15/18 1555   BP: 130/78   BP Location: Left arm   Patient Position: Sitting   Temp: 98.2 °F (36.8 °C)   TempSrc: Temporal Artery    Weight: 81.2 kg (179 lb)   Height: 175.3 cm (69\")     General: Alert and Oriented x 3, No acute distress.  Psych: mood and affect appropriate; recent and remote memory intact  Eyes: conjunctiva clear; pupils equally round and reactive, sclera nonicteric  CV: no peripheral edema  Resp: normal respiratory effort  Skin: no rashes or wounds; normal turgor  Musculosketetal; pain with hip range of motion. Positive stinchfeld test. No trochanteric  Tenderness.  Vascular: palpable distal pulses    Labs:    Appointment on 03/08/2018   Component Date Value Ref Range Status   • Glucose 03/08/2018 173* 65 - 99 mg/dL Final   • BUN 03/08/2018 12  8 - 23 mg/dL Final   • Creatinine 03/08/2018 1.07  0.76 - 1.27 mg/dL Final   • Sodium 03/08/2018 138  136 - 145 mmol/L Final   • Potassium 03/08/2018 3.3* 3.5 - 5.2 mmol/L Final   • Chloride 03/08/2018 97* 98 - 107 mmol/L Final   • CO2 03/08/2018 23.6  22.0 - 29.0 mmol/L Final   • Calcium 03/08/2018 9.6  8.6 - 10.5 mg/dL Final   • eGFR Non  Amer 03/08/2018 68  >60 mL/min/1.73 Final   • BUN/Creatinine Ratio 03/08/2018 11.2  7.0 - 25.0 Final   • Anion Gap 03/08/2018 17.4  mmol/L Final   • WBC 03/08/2018 8.50  4.50 - 10.70 10*3/mm3 Final   • RBC 03/08/2018 5.10  4.60 - 6.00 10*6/mm3 Final   • Hemoglobin 03/08/2018 15.2  13.7 - 17.6 g/dL Final   • Hematocrit 03/08/2018 44.4  40.4 - 52.2 % Final   • MCV 03/08/2018 87.1  79.8 - 96.2 fL Final   • MCH 03/08/2018 29.8  27.0 - 32.7 pg Final   • MCHC 03/08/2018 34.2  32.6 - 36.4 g/dL Final   • RDW 03/08/2018 12.8  11.5 - 14.5 % Final   • RDW-SD 03/08/2018 41.0  37.0 - 54.0 fl Final "   • MPV 03/08/2018 11.3  6.0 - 12.0 fL Final   • Platelets 03/08/2018 223  140 - 500 10*3/mm3 Final   • Color, UA 03/08/2018 Yellow  Yellow, Straw Final   • Appearance, UA 03/08/2018 Clear  Clear Final   • pH, UA 03/08/2018 6.5  5.0 - 8.0 Final   • Specific Gravity, UA 03/08/2018 1.016  1.005 - 1.030 Final   • Glucose, UA 03/08/2018 Negative  Negative Final   • Ketones, UA 03/08/2018 Negative  Negative Final   • Bilirubin, UA 03/08/2018 Negative  Negative Final   • Blood, UA 03/08/2018 Negative  Negative Final   • Protein, UA 03/08/2018 Negative  Negative Final   • Leuk Esterase, UA 03/08/2018 Negative  Negative Final   • Nitrite, UA 03/08/2018 Negative  Negative Final   • Urobilinogen, UA 03/08/2018 1.0 E.U./dL  0.2 - 1.0 E.U./dL Final     Xrays:  Xrays AP pelvis and a lateral of the Left hip were reviewed demonstrating  End stage hip OA with bone on bone articulation, subchondral cysts and periarticular osteophytes.    Assessment:  End-stage Left hip osteoarthritis. Conservative measures have failed.      Plan:  The plan is to proceed with Left Total Hip Replacement. The patient voiced understanding of the risks, benefits, and alternative forms of treatment that were discussed with Dr Pal at the time of scheduling.  Patient is planning on going home with home health next day    Amy Nelson, APRN  3/15/2018

## 2018-03-16 ENCOUNTER — ANESTHESIA (OUTPATIENT)
Dept: PERIOP | Facility: HOSPITAL | Age: 73
End: 2018-03-16

## 2018-03-16 ENCOUNTER — HOSPITAL ENCOUNTER (INPATIENT)
Facility: HOSPITAL | Age: 73
LOS: 1 days | Discharge: HOME HEALTH | End: 2018-03-17
Attending: ORTHOPAEDIC SURGERY | Admitting: ORTHOPAEDIC SURGERY

## 2018-03-16 ENCOUNTER — APPOINTMENT (OUTPATIENT)
Dept: GENERAL RADIOLOGY | Facility: HOSPITAL | Age: 73
End: 2018-03-16

## 2018-03-16 ENCOUNTER — ANESTHESIA EVENT (OUTPATIENT)
Dept: PERIOP | Facility: HOSPITAL | Age: 73
End: 2018-03-16

## 2018-03-16 DIAGNOSIS — R26.2 DIFFICULTY WALKING: Primary | ICD-10-CM

## 2018-03-16 DIAGNOSIS — M16.12 PRIMARY OSTEOARTHRITIS OF LEFT HIP: ICD-10-CM

## 2018-03-16 PROBLEM — M25.552 HIP PAIN, BILATERAL: Status: ACTIVE | Noted: 2018-03-16

## 2018-03-16 PROBLEM — M25.551 HIP PAIN, BILATERAL: Status: ACTIVE | Noted: 2018-03-16

## 2018-03-16 LAB
ABO GROUP BLD: NORMAL
BLD GP AB SCN SERPL QL: NEGATIVE
GLUCOSE BLDC GLUCOMTR-MCNC: 110 MG/DL (ref 70–130)
POTASSIUM BLD-SCNC: 3.3 MMOL/L (ref 3.5–5.2)
RH BLD: NEGATIVE

## 2018-03-16 PROCEDURE — 25010000002 MIDAZOLAM PER 1 MG: Performed by: ANESTHESIOLOGY

## 2018-03-16 PROCEDURE — 25010000002 ONDANSETRON PER 1 MG: Performed by: NURSE PRACTITIONER

## 2018-03-16 PROCEDURE — 25010000002 HYDROMORPHONE PER 4 MG: Performed by: ANESTHESIOLOGY

## 2018-03-16 PROCEDURE — 25010000002 PHENYLEPHRINE PER 1 ML: Performed by: ANESTHESIOLOGY

## 2018-03-16 PROCEDURE — 25010000002 NEOSTIGMINE PER 0.5 MG: Performed by: ANESTHESIOLOGY

## 2018-03-16 PROCEDURE — 25010000003 CEFAZOLIN IN DEXTROSE 2-4 GM/100ML-% SOLUTION: Performed by: NURSE PRACTITIONER

## 2018-03-16 PROCEDURE — 25010000002 DEXAMETHASONE PER 1 MG: Performed by: ANESTHESIOLOGY

## 2018-03-16 PROCEDURE — 27130 TOTAL HIP ARTHROPLASTY: CPT | Performed by: NURSE PRACTITIONER

## 2018-03-16 PROCEDURE — 86850 RBC ANTIBODY SCREEN: CPT | Performed by: ORTHOPAEDIC SURGERY

## 2018-03-16 PROCEDURE — 25010000003 CEFAZOLIN IN DEXTROSE 2-4 GM/100ML-% SOLUTION: Performed by: ORTHOPAEDIC SURGERY

## 2018-03-16 PROCEDURE — C1776 JOINT DEVICE (IMPLANTABLE): HCPCS | Performed by: ORTHOPAEDIC SURGERY

## 2018-03-16 PROCEDURE — 84132 ASSAY OF SERUM POTASSIUM: CPT | Performed by: ANESTHESIOLOGY

## 2018-03-16 PROCEDURE — 0SRB0JA REPLACEMENT OF LEFT HIP JOINT WITH SYNTHETIC SUBSTITUTE, UNCEMENTED, OPEN APPROACH: ICD-10-PCS | Performed by: ORTHOPAEDIC SURGERY

## 2018-03-16 PROCEDURE — 27130 TOTAL HIP ARTHROPLASTY: CPT | Performed by: ORTHOPAEDIC SURGERY

## 2018-03-16 PROCEDURE — 25010000002 PROPOFOL 10 MG/ML EMULSION: Performed by: ANESTHESIOLOGY

## 2018-03-16 PROCEDURE — 97110 THERAPEUTIC EXERCISES: CPT

## 2018-03-16 PROCEDURE — 86900 BLOOD TYPING SEROLOGIC ABO: CPT | Performed by: ORTHOPAEDIC SURGERY

## 2018-03-16 PROCEDURE — 25010000002 ONDANSETRON PER 1 MG: Performed by: ANESTHESIOLOGY

## 2018-03-16 PROCEDURE — 25010000002 FENTANYL CITRATE (PF) 100 MCG/2ML SOLUTION: Performed by: ANESTHESIOLOGY

## 2018-03-16 PROCEDURE — 25010000002 VANCOMYCIN 10 G RECONSTITUTED SOLUTION: Performed by: ORTHOPAEDIC SURGERY

## 2018-03-16 PROCEDURE — 82962 GLUCOSE BLOOD TEST: CPT

## 2018-03-16 PROCEDURE — 97161 PT EVAL LOW COMPLEX 20 MIN: CPT

## 2018-03-16 PROCEDURE — 73501 X-RAY EXAM HIP UNI 1 VIEW: CPT

## 2018-03-16 PROCEDURE — 86901 BLOOD TYPING SEROLOGIC RH(D): CPT | Performed by: ORTHOPAEDIC SURGERY

## 2018-03-16 DEVICE — POLARSTEM STANDARD NON-CEMENTED                                    WITH TI/HA 3
Type: IMPLANTABLE DEVICE | Site: HIP | Status: FUNCTIONAL
Brand: POLARSTEM

## 2018-03-16 DEVICE — R3 3 HOLE ACETABULAR SHELL 56MM
Type: IMPLANTABLE DEVICE | Site: HIP | Status: FUNCTIONAL
Brand: R3 ACETABULAR

## 2018-03-16 DEVICE — R3 20 DEGREE XLPE ACETABULAR LINER                                    36MM ID X OD 56MM
Type: IMPLANTABLE DEVICE | Site: HIP | Status: FUNCTIONAL
Brand: R3

## 2018-03-16 DEVICE — REFLECTION SPHERICAL HEAD SCREW 25MM
Type: IMPLANTABLE DEVICE | Site: HIP | Status: FUNCTIONAL
Brand: REFLECTION

## 2018-03-16 DEVICE — OXINIUM FEMORAL HEAD 12/14 TAPER                                    36 MM M/+4
Type: IMPLANTABLE DEVICE | Site: HIP | Status: FUNCTIONAL
Brand: OXINIUM

## 2018-03-16 DEVICE — IMPLANTABLE DEVICE: Type: IMPLANTABLE DEVICE | Site: HIP | Status: FUNCTIONAL

## 2018-03-16 DEVICE — REFLECTION SPHERICAL HEAD SCREW 30MM
Type: IMPLANTABLE DEVICE | Site: HIP | Status: FUNCTIONAL
Brand: REFLECTION

## 2018-03-16 RX ORDER — MAGNESIUM HYDROXIDE 1200 MG/15ML
LIQUID ORAL AS NEEDED
Status: DISCONTINUED | OUTPATIENT
Start: 2018-03-16 | End: 2018-03-16 | Stop reason: HOSPADM

## 2018-03-16 RX ORDER — NALOXONE HCL 0.4 MG/ML
0.2 VIAL (ML) INJECTION AS NEEDED
Status: DISCONTINUED | OUTPATIENT
Start: 2018-03-16 | End: 2018-03-16 | Stop reason: HOSPADM

## 2018-03-16 RX ORDER — HYDROCODONE BITARTRATE AND ACETAMINOPHEN 7.5; 325 MG/1; MG/1
1 TABLET ORAL EVERY 4 HOURS PRN
Status: DISCONTINUED | OUTPATIENT
Start: 2018-03-16 | End: 2018-03-16

## 2018-03-16 RX ORDER — GLYCOPYRROLATE 0.2 MG/ML
INJECTION INTRAMUSCULAR; INTRAVENOUS AS NEEDED
Status: DISCONTINUED | OUTPATIENT
Start: 2018-03-16 | End: 2018-03-16 | Stop reason: SURG

## 2018-03-16 RX ORDER — OXYCODONE HYDROCHLORIDE AND ACETAMINOPHEN 5; 325 MG/1; MG/1
1 TABLET ORAL EVERY 4 HOURS PRN
Status: DISCONTINUED | OUTPATIENT
Start: 2018-03-16 | End: 2018-03-17 | Stop reason: HOSPADM

## 2018-03-16 RX ORDER — PROMETHAZINE HYDROCHLORIDE 25 MG/ML
12.5 INJECTION, SOLUTION INTRAMUSCULAR; INTRAVENOUS ONCE AS NEEDED
Status: DISCONTINUED | OUTPATIENT
Start: 2018-03-16 | End: 2018-03-16 | Stop reason: HOSPADM

## 2018-03-16 RX ORDER — MIDAZOLAM HYDROCHLORIDE 1 MG/ML
2 INJECTION INTRAMUSCULAR; INTRAVENOUS
Status: DISCONTINUED | OUTPATIENT
Start: 2018-03-16 | End: 2018-03-16 | Stop reason: HOSPADM

## 2018-03-16 RX ORDER — ONDANSETRON 4 MG/1
4 TABLET, ORALLY DISINTEGRATING ORAL EVERY 6 HOURS PRN
Status: DISCONTINUED | OUTPATIENT
Start: 2018-03-16 | End: 2018-03-17 | Stop reason: HOSPADM

## 2018-03-16 RX ORDER — PANTOPRAZOLE SODIUM 40 MG/1
40 TABLET, DELAYED RELEASE ORAL EVERY MORNING
Qty: 14 TABLET | Refills: 0 | Status: SHIPPED | OUTPATIENT
Start: 2018-03-17 | End: 2018-03-31

## 2018-03-16 RX ORDER — ONDANSETRON 2 MG/ML
INJECTION INTRAMUSCULAR; INTRAVENOUS AS NEEDED
Status: DISCONTINUED | OUTPATIENT
Start: 2018-03-16 | End: 2018-03-16 | Stop reason: SURG

## 2018-03-16 RX ORDER — TRIAMTERENE AND HYDROCHLOROTHIAZIDE 37.5; 25 MG/1; MG/1
1 CAPSULE ORAL EVERY MORNING
Status: DISCONTINUED | OUTPATIENT
Start: 2018-03-17 | End: 2018-03-16

## 2018-03-16 RX ORDER — LIDOCAINE HYDROCHLORIDE 10 MG/ML
0.5 INJECTION, SOLUTION EPIDURAL; INFILTRATION; INTRACAUDAL; PERINEURAL ONCE AS NEEDED
Status: DISCONTINUED | OUTPATIENT
Start: 2018-03-16 | End: 2018-03-16 | Stop reason: HOSPADM

## 2018-03-16 RX ORDER — TRIAMTERENE AND HYDROCHLOROTHIAZIDE 37.5; 25 MG/1; MG/1
1 TABLET ORAL DAILY
Status: DISCONTINUED | OUTPATIENT
Start: 2018-03-17 | End: 2018-03-17 | Stop reason: HOSPADM

## 2018-03-16 RX ORDER — MELOXICAM 7.5 MG/1
7.5 TABLET ORAL DAILY
Status: DISCONTINUED | OUTPATIENT
Start: 2018-03-17 | End: 2018-03-17 | Stop reason: HOSPADM

## 2018-03-16 RX ORDER — KETOROLAC TROMETHAMINE 30 MG/ML
15 INJECTION, SOLUTION INTRAMUSCULAR; INTRAVENOUS EVERY 8 HOURS
Status: DISCONTINUED | OUTPATIENT
Start: 2018-03-16 | End: 2018-03-17 | Stop reason: HOSPADM

## 2018-03-16 RX ORDER — FENTANYL CITRATE 50 UG/ML
50 INJECTION, SOLUTION INTRAMUSCULAR; INTRAVENOUS
Status: DISCONTINUED | OUTPATIENT
Start: 2018-03-16 | End: 2018-03-16 | Stop reason: HOSPADM

## 2018-03-16 RX ORDER — OXYCODONE HYDROCHLORIDE AND ACETAMINOPHEN 5; 325 MG/1; MG/1
2 TABLET ORAL EVERY 4 HOURS PRN
Status: DISCONTINUED | OUTPATIENT
Start: 2018-03-16 | End: 2018-03-17 | Stop reason: HOSPADM

## 2018-03-16 RX ORDER — PROMETHAZINE HYDROCHLORIDE 25 MG/1
25 TABLET ORAL ONCE AS NEEDED
Status: DISCONTINUED | OUTPATIENT
Start: 2018-03-16 | End: 2018-03-16 | Stop reason: HOSPADM

## 2018-03-16 RX ORDER — OXYCODONE AND ACETAMINOPHEN 7.5; 325 MG/1; MG/1
1 TABLET ORAL ONCE AS NEEDED
Status: DISCONTINUED | OUTPATIENT
Start: 2018-03-16 | End: 2018-03-16 | Stop reason: HOSPADM

## 2018-03-16 RX ORDER — PROPOFOL 10 MG/ML
VIAL (ML) INTRAVENOUS AS NEEDED
Status: DISCONTINUED | OUTPATIENT
Start: 2018-03-16 | End: 2018-03-16 | Stop reason: SURG

## 2018-03-16 RX ORDER — HYDROCODONE BITARTRATE AND ACETAMINOPHEN 7.5; 325 MG/1; MG/1
1 TABLET ORAL ONCE AS NEEDED
Status: COMPLETED | OUTPATIENT
Start: 2018-03-16 | End: 2018-03-16

## 2018-03-16 RX ORDER — PREGABALIN 75 MG/1
150 CAPSULE ORAL ONCE
Status: COMPLETED | OUTPATIENT
Start: 2018-03-16 | End: 2018-03-16

## 2018-03-16 RX ORDER — MELOXICAM 15 MG/1
15 TABLET ORAL ONCE
Status: COMPLETED | OUTPATIENT
Start: 2018-03-16 | End: 2018-03-16

## 2018-03-16 RX ORDER — PROMETHAZINE HYDROCHLORIDE 25 MG/1
25 SUPPOSITORY RECTAL ONCE AS NEEDED
Status: DISCONTINUED | OUTPATIENT
Start: 2018-03-16 | End: 2018-03-16 | Stop reason: HOSPADM

## 2018-03-16 RX ORDER — HYDRALAZINE HYDROCHLORIDE 20 MG/ML
5 INJECTION INTRAMUSCULAR; INTRAVENOUS
Status: DISCONTINUED | OUTPATIENT
Start: 2018-03-16 | End: 2018-03-16 | Stop reason: HOSPADM

## 2018-03-16 RX ORDER — ACETAMINOPHEN 10 MG/ML
1000 INJECTION, SOLUTION INTRAVENOUS ONCE
Status: COMPLETED | OUTPATIENT
Start: 2018-03-16 | End: 2018-03-16

## 2018-03-16 RX ORDER — ONDANSETRON 2 MG/ML
4 INJECTION INTRAMUSCULAR; INTRAVENOUS EVERY 6 HOURS PRN
Status: DISCONTINUED | OUTPATIENT
Start: 2018-03-16 | End: 2018-03-17 | Stop reason: HOSPADM

## 2018-03-16 RX ORDER — UREA 10 %
3 LOTION (ML) TOPICAL NIGHTLY PRN
Status: DISCONTINUED | OUTPATIENT
Start: 2018-03-16 | End: 2018-03-17 | Stop reason: HOSPADM

## 2018-03-16 RX ORDER — OXYCODONE HYDROCHLORIDE AND ACETAMINOPHEN 5; 325 MG/1; MG/1
TABLET ORAL
Qty: 75 TABLET | Refills: 0 | Status: SHIPPED | OUTPATIENT
Start: 2018-03-16 | End: 2020-09-03

## 2018-03-16 RX ORDER — AMLODIPINE BESYLATE 10 MG/1
10 TABLET ORAL EVERY MORNING
Status: DISCONTINUED | OUTPATIENT
Start: 2018-03-17 | End: 2018-03-17 | Stop reason: HOSPADM

## 2018-03-16 RX ORDER — MIDAZOLAM HYDROCHLORIDE 1 MG/ML
1 INJECTION INTRAMUSCULAR; INTRAVENOUS
Status: DISCONTINUED | OUTPATIENT
Start: 2018-03-16 | End: 2018-03-16 | Stop reason: HOSPADM

## 2018-03-16 RX ORDER — CEFAZOLIN SODIUM 2 G/100ML
2 INJECTION, SOLUTION INTRAVENOUS EVERY 8 HOURS
Status: COMPLETED | OUTPATIENT
Start: 2018-03-16 | End: 2018-03-17

## 2018-03-16 RX ORDER — CEFAZOLIN SODIUM 2 G/100ML
2 INJECTION, SOLUTION INTRAVENOUS ONCE
Status: COMPLETED | OUTPATIENT
Start: 2018-03-16 | End: 2018-03-16

## 2018-03-16 RX ORDER — LABETALOL HYDROCHLORIDE 5 MG/ML
5 INJECTION, SOLUTION INTRAVENOUS
Status: DISCONTINUED | OUTPATIENT
Start: 2018-03-16 | End: 2018-03-16 | Stop reason: HOSPADM

## 2018-03-16 RX ORDER — ONDANSETRON 4 MG/1
4 TABLET, FILM COATED ORAL EVERY 6 HOURS PRN
Status: DISCONTINUED | OUTPATIENT
Start: 2018-03-16 | End: 2018-03-17 | Stop reason: HOSPADM

## 2018-03-16 RX ORDER — SODIUM CHLORIDE, SODIUM LACTATE, POTASSIUM CHLORIDE, CALCIUM CHLORIDE 600; 310; 30; 20 MG/100ML; MG/100ML; MG/100ML; MG/100ML
9 INJECTION, SOLUTION INTRAVENOUS CONTINUOUS
Status: DISCONTINUED | OUTPATIENT
Start: 2018-03-16 | End: 2018-03-16 | Stop reason: HOSPADM

## 2018-03-16 RX ORDER — ONDANSETRON 2 MG/ML
4 INJECTION INTRAMUSCULAR; INTRAVENOUS ONCE AS NEEDED
Status: DISCONTINUED | OUTPATIENT
Start: 2018-03-16 | End: 2018-03-16 | Stop reason: HOSPADM

## 2018-03-16 RX ORDER — DOCUSATE SODIUM 100 MG/1
100 CAPSULE, LIQUID FILLED ORAL 2 TIMES DAILY
Status: DISCONTINUED | OUTPATIENT
Start: 2018-03-16 | End: 2018-03-17 | Stop reason: HOSPADM

## 2018-03-16 RX ORDER — PANTOPRAZOLE SODIUM 40 MG/1
40 TABLET, DELAYED RELEASE ORAL EVERY MORNING
Status: DISCONTINUED | OUTPATIENT
Start: 2018-03-17 | End: 2018-03-17 | Stop reason: HOSPADM

## 2018-03-16 RX ORDER — ASPIRIN 325 MG
325 TABLET, DELAYED RELEASE (ENTERIC COATED) ORAL EVERY 12 HOURS SCHEDULED
Status: DISCONTINUED | OUTPATIENT
Start: 2018-03-17 | End: 2018-03-17 | Stop reason: HOSPADM

## 2018-03-16 RX ORDER — DEXAMETHASONE SODIUM PHOSPHATE 10 MG/ML
INJECTION INTRAMUSCULAR; INTRAVENOUS AS NEEDED
Status: DISCONTINUED | OUTPATIENT
Start: 2018-03-16 | End: 2018-03-16 | Stop reason: SURG

## 2018-03-16 RX ORDER — PSEUDOEPHEDRINE HCL 30 MG
100 TABLET ORAL 2 TIMES DAILY
Qty: 28 CAPSULE | Refills: 0 | Status: SHIPPED | OUTPATIENT
Start: 2018-03-16 | End: 2018-03-30

## 2018-03-16 RX ORDER — PROMETHAZINE HYDROCHLORIDE 25 MG/1
12.5 TABLET ORAL ONCE AS NEEDED
Status: DISCONTINUED | OUTPATIENT
Start: 2018-03-16 | End: 2018-03-16 | Stop reason: HOSPADM

## 2018-03-16 RX ORDER — HYDROMORPHONE HCL 110MG/55ML
0.5 PATIENT CONTROLLED ANALGESIA SYRINGE INTRAVENOUS
Status: DISCONTINUED | OUTPATIENT
Start: 2018-03-16 | End: 2018-03-16 | Stop reason: HOSPADM

## 2018-03-16 RX ORDER — HYDROCODONE BITARTRATE AND ACETAMINOPHEN 7.5; 325 MG/1; MG/1
2 TABLET ORAL EVERY 4 HOURS PRN
Status: DISCONTINUED | OUTPATIENT
Start: 2018-03-16 | End: 2018-03-16

## 2018-03-16 RX ORDER — FAMOTIDINE 10 MG/ML
20 INJECTION, SOLUTION INTRAVENOUS ONCE
Status: COMPLETED | OUTPATIENT
Start: 2018-03-16 | End: 2018-03-16

## 2018-03-16 RX ORDER — FLUMAZENIL 0.1 MG/ML
0.2 INJECTION INTRAVENOUS AS NEEDED
Status: DISCONTINUED | OUTPATIENT
Start: 2018-03-16 | End: 2018-03-16 | Stop reason: HOSPADM

## 2018-03-16 RX ORDER — ONDANSETRON 4 MG/1
4 TABLET, FILM COATED ORAL EVERY 6 HOURS PRN
Qty: 10 TABLET | Refills: 0 | Status: SHIPPED | OUTPATIENT
Start: 2018-03-16 | End: 2020-09-03

## 2018-03-16 RX ORDER — DIPHENHYDRAMINE HYDROCHLORIDE 50 MG/ML
12.5 INJECTION INTRAMUSCULAR; INTRAVENOUS
Status: DISCONTINUED | OUTPATIENT
Start: 2018-03-16 | End: 2018-03-16 | Stop reason: HOSPADM

## 2018-03-16 RX ORDER — FENTANYL CITRATE 50 UG/ML
INJECTION, SOLUTION INTRAMUSCULAR; INTRAVENOUS AS NEEDED
Status: DISCONTINUED | OUTPATIENT
Start: 2018-03-16 | End: 2018-03-16 | Stop reason: SURG

## 2018-03-16 RX ORDER — MELOXICAM 7.5 MG/1
7.5 TABLET ORAL DAILY
Qty: 30 TABLET | Refills: 0 | Status: SHIPPED | OUTPATIENT
Start: 2018-03-17 | End: 2018-04-16

## 2018-03-16 RX ORDER — SODIUM CHLORIDE, SODIUM LACTATE, POTASSIUM CHLORIDE, CALCIUM CHLORIDE 600; 310; 30; 20 MG/100ML; MG/100ML; MG/100ML; MG/100ML
INJECTION, SOLUTION INTRAVENOUS CONTINUOUS PRN
Status: DISCONTINUED | OUTPATIENT
Start: 2018-03-16 | End: 2018-03-16 | Stop reason: SURG

## 2018-03-16 RX ORDER — SODIUM CHLORIDE 0.9 % (FLUSH) 0.9 %
1-10 SYRINGE (ML) INJECTION AS NEEDED
Status: DISCONTINUED | OUTPATIENT
Start: 2018-03-16 | End: 2018-03-16 | Stop reason: HOSPADM

## 2018-03-16 RX ORDER — ROCURONIUM BROMIDE 10 MG/ML
INJECTION, SOLUTION INTRAVENOUS AS NEEDED
Status: DISCONTINUED | OUTPATIENT
Start: 2018-03-16 | End: 2018-03-16 | Stop reason: SURG

## 2018-03-16 RX ORDER — EPHEDRINE SULFATE 50 MG/ML
5 INJECTION, SOLUTION INTRAVENOUS ONCE AS NEEDED
Status: DISCONTINUED | OUTPATIENT
Start: 2018-03-16 | End: 2018-03-16 | Stop reason: HOSPADM

## 2018-03-16 RX ADMIN — POLYETHYLENE GLYCOL 3350 17 G: 17 POWDER, FOR SOLUTION ORAL at 21:07

## 2018-03-16 RX ADMIN — TRANEXAMIC ACID 1000 MG: 100 INJECTION, SOLUTION INTRAVENOUS at 11:30

## 2018-03-16 RX ADMIN — GLYCOPYRROLATE 0.6 MG: 0.2 INJECTION INTRAMUSCULAR; INTRAVENOUS at 12:56

## 2018-03-16 RX ADMIN — SODIUM CHLORIDE, POTASSIUM CHLORIDE, SODIUM LACTATE AND CALCIUM CHLORIDE 9 ML/HR: 600; 310; 30; 20 INJECTION, SOLUTION INTRAVENOUS at 10:19

## 2018-03-16 RX ADMIN — DOCUSATE SODIUM 100 MG: 100 CAPSULE, LIQUID FILLED ORAL at 21:07

## 2018-03-16 RX ADMIN — FAMOTIDINE 20 MG: 10 INJECTION INTRAVENOUS at 10:55

## 2018-03-16 RX ADMIN — OXYCODONE HYDROCHLORIDE AND ACETAMINOPHEN 2 TABLET: 5; 325 TABLET ORAL at 23:02

## 2018-03-16 RX ADMIN — PROPOFOL 150 MG: 10 INJECTION, EMULSION INTRAVENOUS at 11:27

## 2018-03-16 RX ADMIN — NEOSTIGMINE METHYLSULFATE 3 MG: 1 INJECTION INTRAMUSCULAR; INTRAVENOUS; SUBCUTANEOUS at 12:56

## 2018-03-16 RX ADMIN — Medication 2 MG: at 10:59

## 2018-03-16 RX ADMIN — ROCURONIUM BROMIDE 50 MG: 10 INJECTION INTRAVENOUS at 11:28

## 2018-03-16 RX ADMIN — HYDROCODONE BITARTRATE AND ACETAMINOPHEN 1 TABLET: 7.5; 325 TABLET ORAL at 14:17

## 2018-03-16 RX ADMIN — CEFAZOLIN SODIUM 2 G: 2 INJECTION, SOLUTION INTRAVENOUS at 11:38

## 2018-03-16 RX ADMIN — DEXAMETHASONE SODIUM PHOSPHATE 8 MG: 10 INJECTION INTRAMUSCULAR; INTRAVENOUS at 11:35

## 2018-03-16 RX ADMIN — ONDANSETRON 4 MG: 2 INJECTION INTRAMUSCULAR; INTRAVENOUS at 17:00

## 2018-03-16 RX ADMIN — PREGABALIN 150 MG: 75 CAPSULE ORAL at 10:13

## 2018-03-16 RX ADMIN — SODIUM CHLORIDE, POTASSIUM CHLORIDE, SODIUM LACTATE AND CALCIUM CHLORIDE: 600; 310; 30; 20 INJECTION, SOLUTION INTRAVENOUS at 11:26

## 2018-03-16 RX ADMIN — HYDROMORPHONE HYDROCHLORIDE 0.5 MG: 2 INJECTION INTRAMUSCULAR; INTRAVENOUS; SUBCUTANEOUS at 13:56

## 2018-03-16 RX ADMIN — SODIUM CHLORIDE, POTASSIUM CHLORIDE, SODIUM LACTATE AND CALCIUM CHLORIDE 9 ML/HR: 600; 310; 30; 20 INJECTION, SOLUTION INTRAVENOUS at 13:35

## 2018-03-16 RX ADMIN — FENTANYL CITRATE 50 MCG: 50 INJECTION, SOLUTION INTRAMUSCULAR; INTRAVENOUS at 10:53

## 2018-03-16 RX ADMIN — CEFAZOLIN SODIUM 2 G: 2 INJECTION, SOLUTION INTRAVENOUS at 18:10

## 2018-03-16 RX ADMIN — ONDANSETRON 4 MG: 2 INJECTION INTRAMUSCULAR; INTRAVENOUS at 12:45

## 2018-03-16 RX ADMIN — PHENYLEPHRINE HYDROCHLORIDE 200 MCG: 10 INJECTION INTRAVENOUS at 12:00

## 2018-03-16 RX ADMIN — TRANEXAMIC ACID 1000 MG: 100 INJECTION, SOLUTION INTRAVENOUS at 12:56

## 2018-03-16 RX ADMIN — HYDROCODONE BITARTRATE AND ACETAMINOPHEN 1 TABLET: 7.5; 325 TABLET ORAL at 15:11

## 2018-03-16 RX ADMIN — FENTANYL CITRATE 50 MCG: 50 INJECTION INTRAMUSCULAR; INTRAVENOUS at 11:30

## 2018-03-16 RX ADMIN — OXYCODONE HYDROCHLORIDE AND ACETAMINOPHEN 2 TABLET: 5; 325 TABLET ORAL at 18:54

## 2018-03-16 RX ADMIN — HYDROMORPHONE HYDROCHLORIDE 0.5 MG: 2 INJECTION INTRAMUSCULAR; INTRAVENOUS; SUBCUTANEOUS at 13:38

## 2018-03-16 RX ADMIN — FENTANYL CITRATE 50 MCG: 50 INJECTION INTRAMUSCULAR; INTRAVENOUS at 13:05

## 2018-03-16 RX ADMIN — ACETAMINOPHEN 1000 MG: 10 INJECTION, SOLUTION INTRAVENOUS at 11:48

## 2018-03-16 RX ADMIN — VANCOMYCIN HYDROCHLORIDE 1250 MG: 10 INJECTION, POWDER, LYOPHILIZED, FOR SOLUTION INTRAVENOUS at 10:13

## 2018-03-16 RX ADMIN — MELOXICAM 15 MG: 15 TABLET ORAL at 10:13

## 2018-03-16 RX ADMIN — MUPIROCIN: 20 OINTMENT TOPICAL at 21:07

## 2018-03-16 NOTE — PLAN OF CARE
Problem: Patient Care Overview  Goal: Plan of Care Review   03/16/18 7955   Coping/Psychosocial   Plan of Care Reviewed With patient;family   OTHER   Outcome Summary Pt. presents with typical post op THR impairments related to decreased strength, AROM, and overall balance. Pt. will benefit from skilled inpt. P.T. to address his functional deficits and to assist pt. in regaining his independence with functional mobility.

## 2018-03-16 NOTE — DISCHARGE SUMMARY
Patient Name: Coleman Bear  Patient YOB: 1945    Date of Admission:  3/16/2018  Date of Discharge:  3/16/2018  Discharge Diagnosis: TOTAL HIP ARTHROPLASTY  Presenting Problem/History of Present Illness: Primary osteoarthritis of left hip [M16.12]  Hip pain, bilateral [M25.551, M25.552]  Admitting Physician: Dr Kris Pal  Consults:   Consults     No orders found from 2/15/2018 to 3/17/2018.          DETAILS OF HOSPITAL STAY:  Patient is a 72 y.o. male was admitted to the floor following the above procedure and underwent an uncomplicated hospital stay.  Patient did well with physical therapy and was ambulating well without problems.  On the day of discharge the wound was clean, dry and intact and calf was soft and non tender and Homans sign was negative.  Patient was tolerating  without problems.  Patient will be discharged home.    Condition on Discharge:  Stable    Vital Signs  Temp:  [97.3 °F (36.3 °C)-98 °F (36.7 °C)] 97.3 °F (36.3 °C)  Heart Rate:  [62-87] 75  Resp:  [10-18] 14  BP: (109-139)/(73-93) 128/75    LABS:   Admission on 03/16/2018   Component Date Value Ref Range Status   • Glucose 03/16/2018 110  70 - 130 mg/dL Final   • ABO Type 03/16/2018 O   Final   • RH type 03/16/2018 Negative   Final   • Antibody Screen 03/16/2018 Negative   Final   • Potassium 03/16/2018 3.3* 3.5 - 5.2 mmol/L Final       No results found.        Discharge Medications   Coleman Bear   Home Medication Instructions ARLETH:[id number]    Printed on:03/16/18 1800   Medication Information                      acetaminophen (TYLENOL) 500 MG tablet  Take 1,000 mg by mouth Every 6 (Six) Hours As Needed for Mild Pain .             amLODIPine (NORVASC) 10 MG tablet  Take 10 mg by mouth Every Morning.             aspirin  MG EC tablet  Take 1 tablet by mouth Every 12 (Twelve) Hours for 30 days.             atorvastatin (LIPITOR) 20 MG tablet  Take 20 mg by mouth Every Morning.             docusate sodium 100 MG  capsule  Take 100 mg by mouth 2 (Two) Times a Day for 28 doses.             meloxicam (MOBIC) 7.5 MG tablet  Take 1 tablet by mouth Daily for 30 doses.             ondansetron (ZOFRAN) 4 MG tablet  Take 1 tablet by mouth Every 6 (Six) Hours As Needed for Nausea or Vomiting for up to 10 doses.             oxyCODONE-acetaminophen (PERCOCET) 5-325 MG per tablet  1-2 po q 4-6 hr prn pain             pantoprazole (PROTONIX) 40 MG EC tablet  Take 1 tablet by mouth Every Morning for 14 days.             polyethylene glycol (MIRALAX) pack packet  Take 17 g by mouth 2 (Two) Times a Day for 7 days.             triamterene-hydrochlorothiazide (DYAZIDE) 37.5-25 MG per capsule  Take 1 capsule by mouth every morning.                 Activity at Discharge:     Discharge Instructions:   1)  Patient is to continue with physical therapy exercises daily and continue working with the physical therapist as ordered.  2)  Follow NO hip precautions.   3)  Patient may weight bear as tolerated.   4)  Apply ice regularly. You may ice for long periods of time as long as ice is not directly on the skin. Patient instructed on frequent calf pumping exercises.  Patient also instructed on incentive spirometer during hospitalization and encouraged to continue to use at home regularly.   5)  The dressing should be left in place. If waterproof dressing is intact the patient may shower immediately following discharge. If the dressing becomes disloged or saturated it should be changed and patient must wait until POD #5 to shower. If dressing is changed, apply dry sterile dressing after showering.  6)  Follow up appointment in 2 weeks - patient to call the office at [phone] to schedule. 7)  Patient will be discharged on aspirin 325mg BID x 2weeks, then daily x 4weeks    Complete Discharge Diagnosis:    Patient Active Problem List   Diagnosis   • HTN (hypertension)   • Lumbar spinal stenosis   • Benign prostatic hyperplasia with lower urinary tract  symptoms   • Primary osteoarthritis of left hip   • Hip pain, bilateral           Follow-up Appointments  Future Appointments  Date Time Provider Department Center   3/29/2018 2:40 PM Clifford Pal MD [facility] None     Additional Instructions for the Follow-ups that You Need to Schedule     Referral to home health    As directed      Face to Face Visit Date:  3/16/2018    Follow-up Provider for Plan of Care?:  I will be treating the patient on an ongoing basis.  Please send me the Plan of Care for signature.    Follow-up Provider:  CLIFFORD PAL [[id number]]    Reason/Clinical Findings:  post op total hip    Describe mobility limitations that make leaving home difficult:  pain, swelling, decreased strength an mobility, gait abnormality, difficulty ambualting without assistive devices    Nursing/Therapeutic Services Requested:  Physicial Therapy                    Clifford Pal MD  03/16/18  6:00 PM

## 2018-03-16 NOTE — ANESTHESIA POSTPROCEDURE EVALUATION
"Patient: Coleman Bear    Procedure Summary     Date:  03/16/18 Room / Location:  [facility] OR [address] [place] [address] MAIN OR    Anesthesia Start:  1125 Anesthesia Stop:  1317    Procedure:  TOTAL HIP ARTHROPLASTY (Left Hip) Diagnosis:       Primary osteoarthritis of left hip      (Primary osteoarthritis of left hip [M16.12])    Surgeon:  Kris Pal MD Provider:  Ken Odom MD    Anesthesia Type:  general ASA Status:  2          Anesthesia Type: general  Last vitals  BP   137/77 (03/16/18 1400)   Temp   36.5 °C (97.7 °F) (03/16/18 1315)   Pulse   68 (03/16/18 1400)   Resp   12 (03/16/18 1400)     SpO2   98 % (03/16/18 1400)     Post Anesthesia Care and Evaluation    Patient location during evaluation: bedside  Patient participation: complete - patient participated  Level of consciousness: sleepy but conscious  Pain score: 0  Pain management: adequate  Airway patency: patent  Anesthetic complications: No anesthetic complications    Cardiovascular status: acceptable  Respiratory status: acceptable  Hydration status: acceptable    Comments: /77   Pulse 68   Temp 36.5 °C (97.7 °F) (Oral)   Resp 12   Ht 175.3 cm (69\")   Wt 79.8 kg (176 lb)   SpO2 98%   BMI 25.99 kg/m²         "

## 2018-03-16 NOTE — THERAPY EVALUATION
Acute Care - Physical Therapy Initial Evaluation  [facility]     Patient Name: Coleman Bear  : 1945  MRN: [id number]  Today's Date: 3/16/2018   Onset of Illness/Injury or Date of Surgery: 18  Date of Referral to PT: 18  Referring Physician: Kris Ojeda      Admit Date: 3/16/2018    Visit Dx:     ICD-10-CM ICD-9-CM   1. Difficulty walking R26.2 719.7   2. Primary osteoarthritis of left hip M16.12 715.15     Patient Active Problem List   Diagnosis   • HTN (hypertension)   • Lumbar spinal stenosis   • Benign prostatic hyperplasia with lower urinary tract symptoms   • Primary osteoarthritis of left hip   • Hip pain, bilateral     Past Medical History:   Diagnosis Date   • Arthritis    • BPH (benign prostatic hyperplasia)    • Chronic pain disorder    • High cholesterol    • HTN (hypertension)    • Low back pain    • Lumbar stenosis     ABOVE THE L3-5 FUSION   • Shoulder pain      Past Surgical History:   Procedure Laterality Date   • CERVICAL DISC SURGERY     • HERNIA REPAIR      ing   • LUMBAR DISCECTOMY  1992    AND FUSION   • LUMBAR DISCECTOMY FUSION INSTRUMENTATION N/A 2017    Procedure: L2-L3 repeat laminectomy and fusion with instrumentation, removal instrumentation L3 to L5;  Surgeon: Cameron Mcneil MD;  Location: [facility] MAIN OR;  Service:    • LUMBAR EPIDURAL INJECTION     • LUMBAR LAMINECTOMY WITH FUSION  2006   • PROSTATE SURGERY     • SHOULDER ARTHROSCOPY W/ SUBACROMIAL DECOMPRESSION AND DISTAL CLAVICLE EXCISION Right 2011    AND EXTENSIVE ROTATOR CUFF DEBRIDEMENT        PT ASSESSMENT (last 72 hours)      Physical Therapy Evaluation     Row Name 18 1706          PT Evaluation Time/Intention    Subjective Information complains of;weakness;fatigue;pain  -MS     Document Type evaluation  -MS     Mode of Treatment individual therapy  -MS     Patient Effort good  -MS     Comment Pt. reports mild dizziness with initial upright mobility as well as fatigue  and pain/soreness in his Left hip.  -MS     Row Name 03/16/18 1706          General Information    Onset of Illness/Injury or Date of Surgery 03/16/18  -MS     Referring Physician Kris Ojeda  -MS     Prior Level of Function independent:  -MS     Equipment Currently Used at Home none  -MS     Pertinent History of Current Functional Problem Pt. s/p Left THR  -MS     Existing Precautions/Restrictions hip, posterior   Left L.E.  -MS     Equipment Ordered for Patient --   Ordered pt. a Rwx for home use.  -MS     Risks Reviewed patient and family:  -MS     Benefits Reviewed patient and family:  -MS     Barriers to Rehab none identified  -MS     Row Name 03/16/18 1706          Cognitive Assessment/Intervention- PT/OT    Orientation Status (Cognition) oriented x 4  -MS     Follows Commands (Cognition) WNL  -MS     Row Name 03/16/18 1706          Mobility Assessment/Treatment    Extremity Weight-bearing Status left lower extremity  -MS     Left Lower Extremity (Weight-bearing Status) weight-bearing as tolerated (WBAT)  -MS     Row Name 03/16/18 1706          Bed Mobility Assessment/Treatment    Bed Mobility Assessment/Treatment supine-sit  -MS     Supine-Sit Dunn (Bed Mobility) minimum assist (75% patient effort)  -MS     Row Name 03/16/18 1706          Transfer Assessment/Treatment    Transfer Assessment/Treatment sit-stand transfer;stand-sit transfer  -MS     Sit-Stand Dunn (Transfers) contact guard;2 person assist  -MS     Stand-Sit Dunn (Transfers) contact guard;2 person assist  -MS     Row Name 03/16/18 1706          Sit-Stand Transfer    Assistive Device (Sit-Stand Transfers) walker, front-wheeled  -MS     Row Name 03/16/18 1706          Stand-Sit Transfer    Assistive Device (Stand-Sit Transfers) walker, front-wheeled  -MS     Row Name 03/16/18 1706          Gait/Stairs Assessment/Training    Dunn Level (Gait) contact guard;2 person assist  -MS     Assistive Device (Gait) walker,  front-wheeled  -MS     Distance in Feet (Gait) 30  -MS     Pattern (Gait) step-to  -MS     Deviations/Abnormal Patterns (Gait) antalgic;rosette decreased;stride length decreased  -MS     Comment (Gait/Stairs) Verbal/tactile cues for Rwx guidance and for proper gait sequencing with use of the Rwx.  -MS     Row Name 03/16/18 1706          General ROM    GENERAL ROM COMMENTS BUE/RLE (WFL's)  -MS     Row Name 03/16/18 1706          General Assessment (Manual Muscle Testing)    Comment, General Manual Muscle Testing (MMT) Assessment BUE/RLE (4-/5)  -MS     Row Name 03/16/18 1706          Therapeutic Exercise    Lower Extremity (Therapeutic Exercise) --   Left THR Protocol x 10 reps completed with assist  -MS     Row Name 03/16/18 1706          Pain Assessment    Additional Documentation Pain Scale: Numbers Pre/Post-Treatment (Group)  -MS     Row Name 03/16/18 1706          Pain Scale: Numbers Pre/Post-Treatment    Pain Scale: Numbers, Pretreatment 3/10  -MS     Pain Scale: Numbers, Post-Treatment 3/10  -MS     Pain Location - Side Left  -MS     Pain Location hip  -MS     Row Name             Wound 03/16/18 1202 hip incision    Wound - Properties Group Date first assessed: 03/16/18  -AH Time first assessed: 1202  -AH Location: hip  -AH Type: incision  -AH    Row Name 03/16/18 1706          Physical Therapy Clinical Impression    Date of Referral to PT 03/16/18  -MS     Criteria for Skilled Interventions Met (PT Clinical Impression) treatment indicated  -MS     Rehab Potential (PT Clinical Summary) good, to achieve stated therapy goals  -MS     Row Name 03/16/18 1706          Physical Therapy Goals    Bed Mobility Goal Selection (PT) bed mobility, PT goal 1  -MS     Transfer Goal Selection (PT) transfer, PT goal 1  -MS     Gait Training Goal Selection (PT) gait training, PT goal 1  -MS     Stairs Goal Selection (PT) stairs, PT goal 1  -MS     Additional Documentation Stairs Goal Selection (PT) (Row)  -MS     Row Name  03/16/18 1706          Bed Mobility Goal 1 (PT)    [place] Level/Cues Needed (Bed Mobility Goal 1, PT) contact guard assist  -MS     Time Frame (Bed Mobility Goal 1, PT) long term goal (LTG);3 days  -MS     Row Name 03/16/18 1706          Transfer Goal 1 (PT)    Activity/Assistive Device (Transfer Goal 1, PT) walker, rolling  -MS     [place] Level/Cues Needed (Transfer Goal 1, PT) independent  -MS     Time Frame (Transfer Goal 1, PT) long term goal (LTG);3 days  -MS     Row Name 03/16/18 1706          Gait Training Goal 1 (PT)    Activity/Assistive Device (Gait Training Goal 1, PT) walker, rolling  -MS     [place] Level (Gait Training Goal 1, PT) independent  -MS     Distance (Gait Goal 1, PT) 100  -MS     Time Frame (Gait Training Goal 1, PT) long term goal (LTG);3 days  -MS     Row Name 03/16/18 1706          Stairs Goal 1 (PT)    [place] Level/Cues Needed (Stairs Goal 1, PT) contact guard assist   with 1 Handrail  -MS     Number of Stairs (Stairs Goal 1, PT) 2  -MS     Time Frame (Stairs Goal 1, PT) long term goal (LTG);3 days  -MS     Row Name 03/16/18 1706          Positioning and Restraints    Pre-Treatment Position in bed  -MS     Post Treatment Position chair  -MS     In Chair notified nsg;reclined;sitting;call light within reach;encouraged to call for assist;exit alarm on;with family/caregiver   All lines intact. Ice pack to Left hip.  -MS       User Key  (r) = Recorded By, (t) = Taken By, (c) = Cosigned By    Initials Name Provider Type    JASMIN Darnell RN Registered Nurse    MS Fantasma Bosch, PT Physical Therapist          Physical Therapy Education     Title: PT OT SLP Therapies (Done)     Topic: Physical Therapy (Done)     Point: Mobility training (Done)    Learning Progress Summary     Learner Status Readiness Method Response Comment Documented by    Patient Done JACKELYN Peña NR  MS 03/16/18 1431          Point: Home exercise program (Done)    Learning Progress Summary      Learner Status Readiness Method Response Comment Documented by    Patient Done Acceptance JACKELYN ZAVALA NR  MS 03/16/18 1712          Point: Body mechanics (Done)    Learning Progress Summary     Learner Status Readiness Method Response Comment Documented by    Patient Done Acceptance JACKELYN ZAVALA NR  MS 03/16/18 1712          Point: Precautions (Done)    Learning Progress Summary     Learner Status Readiness Method Response Comment Documented by    Patient Done Acceptance JACKELYN ZAVALA NR  MS 03/16/18 1712                      User Key     Initials Effective Dates Name Provider Type Discipline    MS 12/01/15 -  Fantasma Bosch, PT Physical Therapist PT                PT Recommendation and Plan  Anticipated Discharge Disposition (PT): home with home health care  Planned Therapy Interventions (PT Eval): balance training, gait training, bed mobility training, home exercise program, patient/family education, postural re-education, ROM (range of motion), strengthening, transfer training  Therapy Frequency (PT Clinical Impression): 2 times/day  Outcome Summary/Treatment Plan (PT)  Anticipated Equipment Needs at Discharge (PT):  (Ordered pt. a Rwx for home use)  Anticipated Discharge Disposition (PT): home with home health care  Plan of Care Reviewed With: patient, family  Outcome Summary: Pt. presents with typical post op THR impairments related to decreased strength, AROM, and overall balance.  Pt. will benefit from skilled inpt. P.T. to address his functional deficits and to assist pt. in regaining his independence with functional mobility.          Outcome Measures     Row Name 03/16/18 1700             How much help from another person do you currently need...    Turning from your back to your side while in flat bed without using bedrails? 3  -MS      Moving from lying on back to sitting on the side of a flat bed without bedrails? 2  -MS      Moving to and from a bed to a chair (including a wheelchair)? 3  -MS      Standing up from a  chair using your arms (e.g., wheelchair, bedside chair)? 3  -MS      Climbing 3-5 steps with a railing? 2  -MS      To walk in hospital room? 3  -MS      AM-PAC 6 Clicks Score 16  -MS         Functional Assessment    Outcome Measure Options AM-PAC 6 Clicks Basic Mobility (PT)  -MS        User Key  (r) = Recorded By, (t) = Taken By, (c) = Cosigned By    Initials Name Provider Type    MS Fantasma Bosch PT Physical Therapist           Time Calculation:         PT Charges     Row Name 03/16/18 1714             Time Calculation    Start Time 1627  -MS      Stop Time 1650  -MS      Time Calculation (min) 23 min  -MS      PT Received On 03/16/18  -MS      PT - Next Appointment 03/17/18  -MS      PT Goal Re-Cert Due Date 03/19/18  -MS        User Key  (r) = Recorded By, (t) = Taken By, (c) = Cosigned By    Initials Name Provider Type    MS Fantasma Bosch, PT Physical Therapist          Therapy Charges for Today     Code Description Service Date Service Provider Modifiers Qty    76066647331 HC PT EVAL LOW COMPLEXITY 1 3/16/2018 Fantasma Bosch, PT GP 1    52764438589 HC PT THER PROC EA 15 MIN 3/16/2018 Fantasma Bosch, PT GP 1    23370003565 HC PT THER SUPP EA 15 MIN 3/16/2018 Fantasma Bosch, PT GP 1          PT G-Codes  Outcome Measure Options: AM-PAC 6 Clicks Basic Mobility (PT)      Fantasma Bosch, PT  3/16/2018

## 2018-03-16 NOTE — PROGRESS NOTES
Discharge Planning Assessment  [facility]     Patient Name: Coleman Bear  MRN: [id number]  Today's Date: 3/16/2018    Admit Date: 3/16/2018          Discharge Needs Assessment     Row Name 03/16/18 1620       Living Environment    Lives With spouse    Current Living Arrangements home/apartment/condo       Resource/Environmental Concerns    Resource/Environmental Concerns none       Transition Planning    Patient/Family Anticipates Transition to home with family    Transportation Anticipated family or friend will provide       Discharge Needs Assessment    Readmission Within the Last 30 Days no previous admission in last 30 days    Concerns to be Addressed no discharge needs identified    Discharge Facility/Level of Care Needs home with home health            Discharge Plan     Row Name 03/16/18 1622       Plan    Plan Home w/ spouse and [facility].    Patient/Family in Agreement with Plan yes    Plan Comments Facesheet and d/c plan verified, pt plans to d/c home w/ spouse and [facility]. Amanda/Anam notified.         Destination     No service coordination in this encounter.      Durable Medical Equipment     No service coordination in this encounter.      Dialysis/Infusion     No service coordination in this encounter.      Home Medical Care - Selection Complete     Service Request Status Selected Specialties Address Phone Number Fax Number    [facility] [address], [address] [id number] [phone]        Mila Monique, RN 3/16/2018 1610    3/16/2018  Amanda notified.                  Social Care     No service coordination in this encounter.                Demographic Summary    No documentation.           Functional Status     Row Name 03/16/18 1621       Functional Status    Usual Activity Tolerance good    Current Activity Tolerance fair       Functional Status, IADL    Medications independent    Meal Preparation  independent    Housekeeping independent    Laundry independent    Shopping independent       Mental Status    General Appearance WDL WDL            Psychosocial    No documentation.           Abuse/Neglect    No documentation.           Legal    No documentation.           Substance Abuse    No documentation.           Patient Forms     Row Name 03/16/18 9118       Patient Forms    Provider Choice List Delivered    Delivered to Patient    Method of delivery Telephone          Mila Monique RN

## 2018-03-16 NOTE — ANESTHESIA PROCEDURE NOTES
Airway  Urgency: elective      General Information and Staff    Patient location during procedure: OR  Anesthesiologist: NAKIA HA    Indications and Patient Condition  Indications for airway management: airway protection    Preoxygenated: yes  MILS maintained throughout  Mask difficulty assessment: 1 - vent by mask    Final Airway Details  Final airway type: endotracheal airway      Successful airway: ETT  Cuffed: yes   Successful intubation technique: direct laryngoscopy  Endotracheal tube insertion site: oral  Blade: Michael  Blade size: #4  ETT size: 7.5 mm  Cormack-Lehane Classification: grade I - full view of glottis  Placement verified by: chest auscultation   Number of attempts at approach: 1

## 2018-03-16 NOTE — ANESTHESIA PREPROCEDURE EVALUATION
Anesthesia Evaluation     Patient summary reviewed and Nursing notes reviewed                Airway   Mallampati: III  TM distance: <3 FB  Neck ROM: full  Possible difficult intubation  Dental - normal exam     Pulmonary - normal exam   Cardiovascular - normal exam    (+) hypertension, hyperlipidemia,       Neuro/Psych    ROS Comment: Lumbar spinal stenosis  GI/Hepatic/Renal/Endo      Musculoskeletal     (+) chronic pain,   Abdominal  - normal exam   Substance History      OB/GYN          Other   (+) arthritis                     Anesthesia Plan    ASA 2     general     intravenous induction   Anesthetic plan and risks discussed with patient.

## 2018-03-16 NOTE — H&P (VIEW-ONLY)
Patient: Coleman Bear    Date of Admission: 3/15/18    YOB: 1945    Medical Record Number: [id number]    Admitting Physician: Dr. Kris Pal    Reason for Admission: End Stage Left Hip OA    History of Present Illness: 72 y.o. male presents with severe end stage hip osteoarthritis which has not been responsive to the full compliment of conservative measures. Despite conservative attempts, there is still severe, constant activity limiting hip pain. Given the severity of the pain, the patient has elected to proceed with hip replacement.    Allergies: No Known Allergies      Current Medications:  Scheduled Meds:  PRN Meds:.    PMH:  Past Medical History:   Diagnosis Date   • Arthritis    • BPH (benign prostatic hyperplasia)    • Chronic pain disorder    • High cholesterol    • HTN (hypertension)    • Low back pain    • Lumbar stenosis     ABOVE THE L3-5 FUSION   • Shoulder pain         PSURGH:  Past Surgical History:   Procedure Laterality Date   • CERVICAL DISC SURGERY     • HERNIA REPAIR      ing   • LUMBAR DISCECTOMY  07/24/1992    AND FUSION   • LUMBAR DISCECTOMY FUSION INSTRUMENTATION N/A 2/16/2017    Procedure: L2-L3 repeat laminectomy and fusion with instrumentation, removal instrumentation L3 to L5;  Surgeon: Cameron Mcneil MD;  Location: [facility];  Service:    • LUMBAR EPIDURAL INJECTION     • LUMBAR LAMINECTOMY WITH FUSION  08/24/2006   • PROSTATE SURGERY     • SHOULDER ARTHROSCOPY W/ SUBACROMIAL DECOMPRESSION AND DISTAL CLAVICLE EXCISION Right 03/04/2011    AND EXTENSIVE ROTATOR CUFF DEBRIDEMENT       SocialHx:  Social History     Occupational History   • Not on file.     Social History Main Topics   • Smoking status: Never Smoker   • Smokeless tobacco: Never Used   • Alcohol use No   • Drug use: No   • Sexual activity: Defer    Social History     Social History Narrative   • No narrative on file       FamHx:  Family History   Problem Relation Age of Onset   • Hypertension Other   "  • Malig Hyperthermia Neg Hx          Review of Systems:   A 14 point review of systems was performed, pertinent positives discussed above, all other systems are negative    Physical Exam: 72 y.o. male  Vital Signs :   Vitals:    03/15/18 1555   BP: 130/78   BP Location: Left arm   Patient Position: Sitting   Temp: 98.2 °F (36.8 °C)   TempSrc: Temporal Artery    Weight: 81.2 kg (179 lb)   Height: 175.3 cm (69\")     General: Alert and Oriented x 3, No acute distress.  Psych: mood and affect appropriate; recent and remote memory intact  Eyes: conjunctiva clear; pupils equally round and reactive, sclera nonicteric  CV: no peripheral edema  Resp: normal respiratory effort  Skin: no rashes or wounds; normal turgor  Musculosketetal; pain with hip range of motion. Positive stinchfeld test. No trochanteric  Tenderness.  Vascular: palpable distal pulses    Labs:    Appointment on 03/08/2018   Component Date Value Ref Range Status   • Glucose 03/08/2018 173* 65 - 99 mg/dL Final   • BUN 03/08/2018 12  8 - 23 mg/dL Final   • Creatinine 03/08/2018 1.07  0.76 - 1.27 mg/dL Final   • Sodium 03/08/2018 138  136 - 145 mmol/L Final   • Potassium 03/08/2018 3.3* 3.5 - 5.2 mmol/L Final   • Chloride 03/08/2018 97* 98 - 107 mmol/L Final   • CO2 03/08/2018 23.6  22.0 - 29.0 mmol/L Final   • Calcium 03/08/2018 9.6  8.6 - 10.5 mg/dL Final   • eGFR Non  Amer 03/08/2018 68  >60 mL/min/1.73 Final   • BUN/Creatinine Ratio 03/08/2018 11.2  7.0 - 25.0 Final   • Anion Gap 03/08/2018 17.4  mmol/L Final   • WBC 03/08/2018 8.50  4.50 - 10.70 10*3/mm3 Final   • RBC 03/08/2018 5.10  4.60 - 6.00 10*6/mm3 Final   • Hemoglobin 03/08/2018 15.2  13.7 - 17.6 g/dL Final   • Hematocrit 03/08/2018 44.4  40.4 - 52.2 % Final   • MCV 03/08/2018 87.1  79.8 - 96.2 fL Final   • MCH 03/08/2018 29.8  27.0 - 32.7 pg Final   • MCHC 03/08/2018 34.2  32.6 - 36.4 g/dL Final   • RDW 03/08/2018 12.8  11.5 - 14.5 % Final   • RDW-SD 03/08/2018 41.0  37.0 - 54.0 fl Final "   • MPV 03/08/2018 11.3  6.0 - 12.0 fL Final   • Platelets 03/08/2018 223  140 - 500 10*3/mm3 Final   • Color, UA 03/08/2018 Yellow  Yellow, Straw Final   • Appearance, UA 03/08/2018 Clear  Clear Final   • pH, UA 03/08/2018 6.5  5.0 - 8.0 Final   • Specific Gravity, UA 03/08/2018 1.016  1.005 - 1.030 Final   • Glucose, UA 03/08/2018 Negative  Negative Final   • Ketones, UA 03/08/2018 Negative  Negative Final   • Bilirubin, UA 03/08/2018 Negative  Negative Final   • Blood, UA 03/08/2018 Negative  Negative Final   • Protein, UA 03/08/2018 Negative  Negative Final   • Leuk Esterase, UA 03/08/2018 Negative  Negative Final   • Nitrite, UA 03/08/2018 Negative  Negative Final   • Urobilinogen, UA 03/08/2018 1.0 E.U./dL  0.2 - 1.0 E.U./dL Final     Xrays:  Xrays AP pelvis and a lateral of the Left hip were reviewed demonstrating  End stage hip OA with bone on bone articulation, subchondral cysts and periarticular osteophytes.    Assessment:  End-stage Left hip osteoarthritis. Conservative measures have failed.      Plan:  The plan is to proceed with Left Total Hip Replacement. The patient voiced understanding of the risks, benefits, and alternative forms of treatment that were discussed with Dr Pal at the time of scheduling.  Patient is planning on going home with home health next day    Amy Nelson, APRN  3/15/2018

## 2018-03-16 NOTE — DISCHARGE PLACEMENT REQUEST
"Coleman Perez (72 y.o. Male)     Date of Birth Social Security Number Address Home Phone MRN    1945  [address] [facility] [address] [phone] [id number]    [employer] Mar[employer] Status          [employer]        Admission Date Admission Type Admitting Provider Attending Provider Department, Room/Bed    3/16/18 Elective Kris Pal MD Brown, Reid B, MD [address], 76/[address]    Discharge Date Discharge Disposition Discharge Destination                       Attending Provider:  Kris Pal MD    Allergies:  No Known Allergies    Isolation:  None   Infection:  None   Code Status:  FULL    Ht:  175.3 cm (69\")   Wt:  79.8 kg (176 lb)    Admission Cmt:  None   Principal Problem:  Primary osteoarthritis of left hip [M16.12] More...                 Active Insurance as of 3/16/2018     Primary Coverage     Payor Plan Insurance Group Employer/Plan Group    MEDICARE MEDICARE A & B      Payor Plan Address Payor Plan Phone Number Effective From Effective To    [address] [phone] 9/1/2008     [place] [address]       Subscriber Name Subscriber Birth Date Member ID       COLEMAN PEREZ 1945 [id number]           Secondary Coverage     Payor Plan Insurance Group Employer/Plan Group    [facility] SUPP [id number]     Payor Plan Address Payor Plan Phone Number Effective From Effective To    [address]  4/1/2009     [place] [address]       Subscriber Name Subscriber Birth Date Member ID       COLEMAN PEREZ 1945 [id number]                 Emergency Contacts      (Rel.) Home Phone Work Phone Mobile Phone    Amaya Perez (Spouse) [phone] -- [phone]    Johnson,Duane (Son) -- -- [phone]    Génesis Amaya (Daughter) -- -- [phone]              "

## 2018-03-16 NOTE — PLAN OF CARE
Problem: Patient Care Overview  Goal: Plan of Care Review  Outcome: Ongoing (interventions implemented as appropriate)   03/16/18 2767   Coping/Psychosocial   Plan of Care Reviewed With patient   OTHER   Outcome Summary Pt arrived on floor 1500, c/o minimal pain, MAKI dressing c/d/i, pt nauseous after PT worked him-administered zofran and gingerale-pt felt better, ambulates well, has not voided, educated pt on bp monitoring, plan to d/c home w/HH   Plan of Care Review   Progress improving     Goal: Individualization and Mutuality  Outcome: Outcome(s) achieved Date Met: 03/16/18      Problem: Fall Risk (Adult)  Goal: Identify Related Risk Factors and Signs and Symptoms  Outcome: Outcome(s) achieved Date Met: 03/16/18    Goal: Absence of Fall  Outcome: Ongoing (interventions implemented as appropriate)      Problem: Hip Arthroplasty (Total, Partial) (Adult)  Goal: Signs and Symptoms of Listed Potential Problems Will be Absent, Minimized or Managed (Hip Arthroplasty)  Outcome: Ongoing (interventions implemented as appropriate)    Goal: Anesthesia/Sedation Recovery  Outcome: Ongoing (interventions implemented as appropriate)

## 2018-03-16 NOTE — OP NOTE
Name: Coleman Bear  YOB: 1945    DATE OF SURGERY: 3/16/2018    PREOPERATIVE DIAGNOSIS: Left hip end-stage osteoarthritis    POSTOPERATIVE DIAGNOSIS: Left hip end-stage osteoarthritis    PROCEDURE PERFORMED: Left  total hip replacement    SURGEON: Kris Pal M.D.    ASSISTANT: VIANNEY FLOYD    IMPLANTS:    Implant Name Type Inv. Item Serial No.  Lot No. LRB No. Used   SHLL ACET R3 3H STD 56MM - [id number] Implant SHLL ACET R3 3H STD 56MM  [employer] [id number] Left 1   LINER ACET R3 XLPE 20D 66P71AP - [id number] Implant LINER ACET R3 XLPE 20D [id number]  [employer] [id number] Left 1   SCRW SPH HD REFLECTION 6.5X25MM - [id number] Implant SCRW SPH HD REFLECTION 6.5X25MM  [employer] [id number] Left 1   SCRW SPH HD REFLECTION 6.5X30MM - [id number] Implant SCRW SPH HD REFLECTION 6.5X30MM  [employer] [id number] Left 1   STEM POLARSTEM CMTLESS STD TI/HA 3 - [id number] Implant STEM POLARSTEM CMTLESS STD TI/HA 3  [employer] [id number] Left 1   HD FEM OXINIUM TPR 12 14 36MM PLS4 - [id number] Implant HD FEM OXINIUM TPR 12 14 36MM PLS4   [employer] [id number] Left 1       Estimated Blood Loss: 200cc  Specimens : none  Complications: none    DESCRIPTION OF PROCEDURE:    The patient was taken to the operating room and placed in the supine position. A sequential compression device was carefully placed on the non-operative leg. Preoperative antibiotics were administered. Surgical time out was performed. After adequate induction of anesthesia the patient was then transferred onto the  table and positioned appropriately in the lateral decubitus position. The hip was then prepped and draped in the usual sterile fashion.    A posterior lateral surgical incision was then made.  The gluteus arvin fascia was then divided the gluteus arvin muscle was then bluntly dissected.  A Charnley self-retaining retractor was then placed.  A posterior capsulotomy was then performed.  The  superior limb was divided in line with the piriformis tendon.  The hip was then dislocated.  There were end-stage arthritic findings.  The femoral neck osteotomy was performed according to the level dictated by the template.  The acetabulum was exposed with standard retractors.  The labrum and pulvinar were then excised.  The cup was then medialized with the starting acetabular reamer to the medial wall.  I then progressively reamed up to the appropriate size and 45° of abduction and 20° of anteversion. Line to line reaming was performed. At this point the bone was nicely prepared there was excellent bleeding bone. We then impacted the acetabular component in 45 of abduction and 20 of anteversion the cup was stable.  Per routine we augmented the fixation with 2 screws in the posterior and superior quadrant  The final liner was then placed and it locked in nicely.  At this point we injected the hip with anesthetic cocktail solution.  We then turned our attention to the femur.   Standard retractors were placed to expose the femur.  The box osteotome was used to create a starting point.  The canal finder was then used to sound the canal.  The lateralizing reamer was then used to lateralize into the greater trochanter.  We progressively reamed and broached until the broach was very solid to axial and rotational stresses.  At this point we placed the trial neck and head hip was very stable to flexion and internal rotation as well as extension and external rotation.  The leg lengths were measured to be equal.  We then removed the trial components,copiously irrigated the hip, and then impacted the final stem.  At this point we then trialed and chose the final head. The head was placed on a clean dry taper.  The leg lengths were again measured to be equal.  At this point the hip was copiously irrigated with pulse lavage and the capsule was then reapproximated with #1 PDS suture, and the remainder of the hip was closed in  multiple layers in standard fashion..  There was excellent hemostasis. We placed a one-eighth inch Hemovac drain.  Sterile dressing were applied. At the end of the case, the sponge and needle counts were reported as being correct. There were no known complications. The patient was then transported to the recovery room.      Kris Pal M.D.  3/16/2018

## 2018-03-17 VITALS
RESPIRATION RATE: 16 BRPM | DIASTOLIC BLOOD PRESSURE: 67 MMHG | OXYGEN SATURATION: 95 % | TEMPERATURE: 97.3 F | HEART RATE: 63 BPM | HEIGHT: 69 IN | WEIGHT: 176 LBS | SYSTOLIC BLOOD PRESSURE: 111 MMHG | BODY MASS INDEX: 26.07 KG/M2

## 2018-03-17 LAB
HCT VFR BLD AUTO: 39.8 % (ref 40.4–52.2)
HGB BLD-MCNC: 13.6 G/DL (ref 13.7–17.6)

## 2018-03-17 PROCEDURE — 97150 GROUP THERAPEUTIC PROCEDURES: CPT

## 2018-03-17 PROCEDURE — 97110 THERAPEUTIC EXERCISES: CPT

## 2018-03-17 PROCEDURE — 25010000003 CEFAZOLIN IN DEXTROSE 2-4 GM/100ML-% SOLUTION: Performed by: NURSE PRACTITIONER

## 2018-03-17 PROCEDURE — 85014 HEMATOCRIT: CPT | Performed by: NURSE PRACTITIONER

## 2018-03-17 PROCEDURE — G8980 MOBILITY D/C STATUS: HCPCS

## 2018-03-17 PROCEDURE — 85018 HEMOGLOBIN: CPT | Performed by: NURSE PRACTITIONER

## 2018-03-17 RX ADMIN — OXYCODONE HYDROCHLORIDE AND ACETAMINOPHEN 1 TABLET: 5; 325 TABLET ORAL at 11:58

## 2018-03-17 RX ADMIN — CEFAZOLIN SODIUM 2 G: 2 INJECTION, SOLUTION INTRAVENOUS at 03:48

## 2018-03-17 RX ADMIN — AMLODIPINE BESYLATE 10 MG: 10 TABLET ORAL at 07:54

## 2018-03-17 RX ADMIN — MUPIROCIN: 20 OINTMENT TOPICAL at 07:54

## 2018-03-17 RX ADMIN — MELOXICAM 7.5 MG: 7.5 TABLET ORAL at 07:53

## 2018-03-17 RX ADMIN — DOCUSATE SODIUM 100 MG: 100 CAPSULE, LIQUID FILLED ORAL at 07:54

## 2018-03-17 RX ADMIN — TRIAMTERENE AND HYDROCHLOROTHIAZIDE 1 TABLET: 37.5; 25 TABLET ORAL at 07:53

## 2018-03-17 RX ADMIN — OXYCODONE HYDROCHLORIDE AND ACETAMINOPHEN 2 TABLET: 5; 325 TABLET ORAL at 07:54

## 2018-03-17 RX ADMIN — OXYCODONE HYDROCHLORIDE AND ACETAMINOPHEN 2 TABLET: 5; 325 TABLET ORAL at 03:48

## 2018-03-17 RX ADMIN — PANTOPRAZOLE SODIUM 40 MG: 40 TABLET, DELAYED RELEASE ORAL at 03:49

## 2018-03-17 RX ADMIN — POLYETHYLENE GLYCOL 3350 17 G: 17 POWDER, FOR SOLUTION ORAL at 07:54

## 2018-03-17 RX ADMIN — ASPIRIN 325 MG: 325 TABLET, DELAYED RELEASE ORAL at 07:54

## 2018-03-17 NOTE — THERAPY DISCHARGE NOTE
Acute Care - Physical Therapy Treatment Note/Discharge  [facility]     Patient Name: Coleman Bear  : 1945  MRN: [id number]  Today's Date: 3/17/2018  Onset of Illness/Injury or Date of Surgery: 18  Date of Referral to PT: 18  Referring Physician: Kris Ojeda    Admit Date: 3/16/2018    Visit Dx:    ICD-10-CM ICD-9-CM   1. Difficulty walking R26.2 719.7   2. Primary osteoarthritis of left hip M16.12 715.15     Patient Active Problem List   Diagnosis   • HTN (hypertension)   • Lumbar spinal stenosis   • Benign prostatic hyperplasia with lower urinary tract symptoms   • Primary osteoarthritis of left hip   • Hip pain, bilateral       Physical Therapy Education     Title: PT OT SLP Therapies (Resolved)     Topic: Physical Therapy (Resolved)     Point: Mobility training (Resolved)    Learning Progress Summary     Learner Status Readiness Method Response Comment Documented by    Patient Done Acceptance E,TB VU,NR   18 1421     Done Acceptance E,D VU,NR  MS 18 1712          Point: Home exercise program (Resolved)    Learning Progress Summary     Learner Status Readiness Method Response Comment Documented by    Patient Done Acceptance E,TB VU,NR   18 1421     Done Acceptance E,D VU,NR  MS 18 1712          Point: Body mechanics (Resolved)    Learning Progress Summary     Learner Status Readiness Method Response Comment Documented by    Patient Done Acceptance E,TB VU,NR   18 1421     Done Acceptance E,D VU,NR  MS 18 1712          Point: Precautions (Resolved)    Learning Progress Summary     Learner Status Readiness Method Response Comment Documented by    Patient Done Acceptance E,TB VU,NR   18 1421     Done Acceptance E,D VU,NR  MS 18 1712                      User Key     Initials Effective Dates Name Provider Type Discipline     12/01/15 -  Lesa Mcdonnell, PT Physical Therapist PT    MS 12/01/15 -  Fantasma Bosch, PT Physical Therapist  PT                    PT Rehab Goals     Row Name 03/17/18 1030 03/16/18 1706          Bed Mobility Goal 1 (PT)    [place] Level/Cues Needed (Bed Mobility Goal 1, PT)  -- contact guard assist  -MS     Time Frame (Bed Mobility Goal 1, PT)  -- long term goal (LTG);3 days  -MS     Progress/Outcomes (Bed Mobility Goal 1, PT) discharged from facility  -EE  --        Transfer Goal 1 (PT)    Activity/Assistive Device (Transfer Goal 1, PT)  -- walker, rolling  -MS     [place] Level/Cues Needed (Transfer Goal 1, PT)  -- independent  -MS     Time Frame (Transfer Goal 1, PT)  -- long term goal (LTG);3 days  -MS     Progress/Outcome (Transfer Goal 1, PT) goal not met;discharged from facility  -EE  --        Gait Training Goal 1 (PT)    Activity/Assistive Device (Gait Training Goal 1, PT)  -- walker, rolling  -MS     [place] Level (Gait Training Goal 1, PT)  -- independent  -MS     Distance (Gait Goal 1, PT)  -- 100  -MS     Time Frame (Gait Training Goal 1, PT)  -- long term goal (LTG);3 days  -MS     Progress/Outcome (Gait Training Goal 1, PT) goal partially met;discharged from facility  -EE  --        Stairs Goal 1 (PT)    [place] Level/Cues Needed (Stairs Goal 1, PT)  -- contact guard assist   with 1 Handrail  -MS     Number of Stairs (Stairs Goal 1, PT)  -- 2  -MS     Time Frame (Stairs Goal 1, PT)  -- long term goal (LTG);3 days  -MS     Progress/Outcome (Stairs Goal 1, PT) goal met  -  --       User Key  (r) = Recorded By, (t) = Taken By, (c) = Cosigned By    Initials Name Provider Type    EE Lesa Mcdonnell, PT Physical Therapist    MS Fantasma Bosch, PT Physical Therapist        Therapy Treatment    Therapy Treatment / Health Promotion    Treatment Time/Intention  Discipline: physical therapist (Lesa Mcdonnell, PT)  Document Type: therapy note (daily note) (Lesa Mcdonnell, PT)  Subjective Information: complains of, pain (Lesa Mcdonnell, PT)  Patient/Family Observations: Sitting up in chair in therapy gym  (Lesa Mcdonnell, PT)  Patient Effort: good (Lesa Mcdonnell PT)  Existing Precautions/Restrictions: fall, hip, posterior (L hip) (Lesa Mcdonnell PT)  Plan of Care Review  Plan of Care Reviewed With: patient (Lesa Mcdonnell PT)    Vitals/Pain/Safety  Pain Assessment  Additional Documentation: Pain Scale: Numbers Pre/Post-Treatment (Group) (Lesa Mcdonnell, PT)  Pain Scale: Numbers Pre/Post-Treatment  Pain Scale: Numbers, Pretreatment: 4/10 (Lesa Mcdonnell, PT)  Pain Location - Side: Left (Lesamarycarmen Mcdonnell, PT)  Pain Location - Orientation: posterior (Lesa Mcdonnell, PT)  Pain Location: hip (Lesa Sathya, PT)  Pre/Post Treatment Pain Comment: Pain improved w/rest at end of tx (Lesa Mcdonnell PT)  Pain Scale: Word Pre/Post-Treatment  Pain Location - Side: Left (Lesa Sathya, PT)  Pain Location - Orientation: posterior (Lesa Mcdonnell, PT)  Pain Location: hip (Lesa Mcdonnell, PT)  Pain Scale: FACES Pre/Post-Treatment  Pain Location - Side: Left (Lesa Sathya, PT)  Pain Location - Orientation: posterior (Lesa Sathya, PT)  Pain Location: hip (Lesa Sathya, PT)  Positioning and Restraints  Pre-Treatment Position: sitting in chair/recliner (Lesa Mcdonnell PT)  Post Treatment Position: chair (Lesa Mcdonnell PT)  In Chair: reclined, call light within reach, encouraged to call for assist, with family/caregiver, legs elevated, notified nsg (Lesa Mcdonnell PT)    Mobility,ADL,Motor, Modality  Bed Mobility Assessment/Treatment  Comment (Bed Mobility): up in chair (Lesa Mcdonnell PT)  Transfer Assessment/Treatment  Comment (Transfers): verbal cues for hand placement and to maintain hip precautions (Lesa Mcdnonell PT)  Sit-Stand Transfer  Sit-Stand [place] (Transfers): contact guard, verbal cues (Lesa Mcdonnell PT)  Assistive Device (Sit-Stand Transfers): walker, front-wheeled (Lesa Mcdonnell PT)  Stand-Sit Transfer  Stand-Sit [place] (Transfers): supervision, verbal cues (Lesa Mcdonnell PT)  Assistive Device (Stand-Sit Transfers): walker, front-wheeled (Lesa Mcdonnell  PT)  Gait/Stairs Assessment/Training  [place] Level (Gait): supervision, verbal cues (Lesa Mcdonnell PT)  Assistive Device (Gait): walker, front-wheeled (Lesa Mcdonnell PT)  Distance in Feet (Gait): 100 (Lesa Mcdonnell PT)  Pattern (Gait): step-to (Lesa Mcdonnell PT)  Deviations/Abnormal Patterns (Gait): antalgic, rosette decreased, stride length decreased (Lesa Mcdonnell PT)  [place] Level (Stairs): contact guard, verbal cues (Lesa Mcdonnell PT)  Handrail Location (Stairs): both sides (Lesa Mcdonnell PT)  Number of Steps (Stairs): 4 (Lesa Mcdonnell PT)  Ascending Technique (Stairs): step-to-step (Lesa Mcdonnell PT)  Descending Technique (Stairs): step-to-step (Lesa Mcdonnell PT)  Comment (Gait/Stairs): cues for sequencing on stairs and for sequencing with use of walker during ambulation; no LOB or safety concerns observed during gait/stair training (Lesa Mcdonnell PT)     Motor Skills Assessment/Interventions  Additional Documentation: Therapeutic Exercise (Group) (Lesa Mcdonnell PT)  Therapeutic Exercise  Comment (Therapeutic Exercise): 15 reps L DHRUV protocol completed (Lesa Mcdonnell PT)           ROM/MMT             Sensory, Edema, Orthotics          Cognition, Communication, Swallow  Cognitive Assessment/Intervention  Additional Documentation: Cognitive Assessment/Intervention (Group) (Lesa Mcdonnell PT)  Cognitive Assessment/Intervention- PT/OT  Affect/Mental Status (Cognitive): WFL (Lesa Mcdonnell PT)  Orientation Status (Cognition): oriented x 4 (Lesa Mcdonnell PT)  Follows Commands (Cognition): WNL (Lesa Mcdonnell PT)  Cognitive Function (Cognitive): WFL (Lesa Mcdonnell PT)  Personal Safety Interventions: fall prevention program maintained, gait belt, muscle strengthening facilitated, nonskid shoes/slippers when out of bed, supervised activity (Lesa Mcdonnell PT)    Outcome Summary  Outcome Summary/Treatment Plan (PT)  Anticipated Discharge Disposition (PT): home with home health care (Lesa Mcdonnell PT)    PT Recommendation and  Plan  Anticipated Discharge Disposition (PT): home with home health care  Outcome Summary/Treatment Plan (PT)  Anticipated Discharge Disposition (PT): home with home health care  Plan of Care Reviewed With: patient  Progress: improving  Outcome Summary: Pt demonstrates good progress w/mobility, able to tolerate increased gait distance and demonstrates increased independence with mobility. Navigated 4 stairs with use of handrail and CGA; no LOB or safety concerns with mobility. Demonstrates independence with LE HEP and good understanding of safety precautions. Pt plans to DC home w/spouse and continue w/HH PT; no problems anticipated.           Outcome Measures     Row Name 03/17/18 1400 03/16/18 1700          How much help from another person do you currently need...    Turning from your back to your side while in flat bed without using bedrails? 4  -EE 3  -MS     Moving from lying on back to sitting on the side of a flat bed without bedrails? 3  -EE 2  -MS     Moving to and from a bed to a chair (including a wheelchair)? 3  -EE 3  -MS     Standing up from a chair using your arms (e.g., wheelchair, bedside chair)? 3  -EE 3  -MS     Climbing 3-5 steps with a railing? 3  -EE 2  -MS     To walk in hospital room? 3  -EE 3  -MS     AM-PAC 6 Clicks Score 19  -EE 16  -MS        Functional Assessment    Outcome Measure Options AM-PAC 6 Clicks Basic Mobility (PT)  -EE AM-PAC 6 Clicks Basic Mobility (PT)  -MS       User Key  (r) = Recorded By, (t) = Taken By, (c) = Cosigned By    Initials Name Provider Type    LB Mcdonnell, PT Physical Therapist    MS Fantasma Bosch, PT Physical Therapist           Time Calculation:         PT Charges     Row Name 03/17/18 1424             Time Calculation    Start Time 1030  -EE      Stop Time 1110  -EE      Time Calculation (min) 40 min  -EE      PT Received On 03/17/18  -EE        User Key  (r) = Recorded By, (t) = Taken By, (c) = Cosigned By    Initials Name Provider Type    LB Mcfadden  JP Mcdonnell Physical Therapist          Therapy Charges for Today     Code Description Service Date Service Provider Modifiers Qty    15887049408 HC PT MOBILITY DISCHARGE 3/17/2018 Lesa Mcdnonell, PT GP, CJ 1    22846205774 HC PT THER PROC EA 15 MIN 3/17/2018 Lesa Mcdonnell, PT GP 1    98946012743 HC PT THER PROC GROUP 3/17/2018 Lesa Mcdonnell, PT GP 1          PT G-Codes  Outcome Measure Options: AM-PAC 6 Clicks Basic Mobility (PT)  Functional Limitation: Mobility: Walking and moving around  Mobility: Walking and Moving Around Discharge Status (): At least 20 percent but less than 40 percent impaired, limited or restricted    PT Discharge Summary  Anticipated Discharge Disposition (PT): home with home health care  Reason for Discharge: Discharge from facility  Outcomes Achieved: Patient able to partially acheive established goals  Discharge Destination: Home with home health    Lesa Mcdonnell PT  3/17/2018

## 2018-03-17 NOTE — PLAN OF CARE
Problem: Patient Care Overview  Goal: Plan of Care Review  Outcome: Ongoing (interventions implemented as appropriate)   03/17/18 6334   Coping/Psychosocial   Plan of Care Reviewed With patient;spouse   OTHER   Outcome Summary VSS. Bp stable on home BP medication regimen. education provided on BP monitoring at discharge. no s/s of HTN this shift. unable to void. garcia placed and urology consulted. patient to be discharged with garcia and to f/u with urologist this week. voices adequate pain control with PO Percocet. PT gym session x 1. increased ambulation with PT and nursing staff. left hip MAKI dressing CDI. discharge home with family support and caretenders HH.    Plan of Care Review   Progress improving       Problem: Fall Risk (Adult)  Goal: Absence of Fall  Outcome: Ongoing (interventions implemented as appropriate)

## 2018-03-17 NOTE — PLAN OF CARE
Problem: Patient Care Overview  Goal: Plan of Care Review   03/17/18 8353   Coping/Psychosocial   Plan of Care Reviewed With patient   OTHER   Outcome Summary Pt demonstrates good progress w/mobility, able to tolerate increased gait distance and demonstrates increased independence with mobility. Navigated 4 stairs with use of handrail and CGA; no LOB or safety concerns with mobility. Demonstrates independence with LE HEP and good understanding of safety precautions. Pt plans to DC home w/spouse and continue w/HH PT; no problems anticipated.    Plan of Care Review   Progress improving

## 2018-03-17 NOTE — PLAN OF CARE
Problem: Patient Care Overview  Goal: Plan of Care Review  Outcome: Ongoing (interventions implemented as appropriate)   03/17/18 7926   Coping/Psychosocial   Plan of Care Reviewed With patient   OTHER   Outcome Summary pain controlled during shift with PO pain meds; ambulates with assistance; neurovasculars intact; MAKI dressing intact; patient unable to void- straight cath x 1, 1000 output; due to void at 0500 again, will continue to monitor; educated pt about importance of BP monitoring r/t hx of HTN; possible d/c in AM   Plan of Care Review   Progress improving       Problem: Fall Risk (Adult)  Goal: Absence of Fall  Outcome: Ongoing (interventions implemented as appropriate)      Problem: Hip Arthroplasty (Total, Partial) (Adult)  Goal: Signs and Symptoms of Listed Potential Problems Will be Absent, Minimized or Managed (Hip Arthroplasty)  Outcome: Ongoing (interventions implemented as appropriate)

## 2018-03-17 NOTE — NURSING NOTE
Ok to discharge home with garcia catheter, per Dr. Barnhart. Patient to follow-up with established urologist (dr. Reza) in 1 week.

## 2018-03-17 NOTE — PROGRESS NOTES
Orthopedic Progress Note        Patient: Coleman Bear    Date of Admission: 3/16/2018  9:04 AM    YOB: 1945    Medical Record Number: [id number]    Attending Physician: Kris Pal MD    POD : 1    Systemic or Specific Complaints: Reports pain adequately controlled.  Tolerating po well.  No complaints or problems other than difficulty voiding.  Patient states that every time he has had surgeries had difficulty voiding and has required discharge home with a catheter.  He is seen by his local urologist for this in the past..       No Known Allergies    Current Medications:  Scheduled Meds:  amLODIPine 10 mg Oral QAM   aspirin 325 mg Oral Q12H   docusate sodium 100 mg Oral BID   ketorolac 15 mg Intravenous Q8H   meloxicam 7.5 mg Oral Daily   pantoprazole 40 mg Oral QAM   polyethylene glycol 17 g Oral BID   triamterene-hydrochlorothiazide 1 tablet Oral Daily     Continuous Infusions:   PRN Meds:.melatonin  •  ondansetron **OR** ondansetron ODT **OR** ondansetron  •  oxyCODONE-acetaminophen  •  oxyCODONE-acetaminophen      Physical Exam: 72 y.o. male    General Appearance:   Awake and alert.  NAD.He is at bedside   Vitals:    03/16/18 1826 03/16/18 2300 03/17/18 0300 03/17/18 0720   BP: 136/80 118/67 129/76 115/62   BP Location: Right arm Right arm Right arm Right arm   Patient Position: Sitting Sitting Lying Lying   Pulse: 96 67 71 68   Resp: 16 16 16 18   Temp: 97 °F (36.1 °C) 97.7 °F (36.5 °C) 96.9 °F (36.1 °C) 97.7 °F (36.5 °C)   TempSrc: Oral Oral Oral Oral   SpO2: 94% 94% 94% 95%   Weight:       Height:              Extremities:   Left hip dressing is dry.  Left thigh was soft and left calf nontender without sign of DVT.  Patient was able to dorsiflex and plantarflex ankle well.  Pulses palpable at the foot and grossly sensate light touch throughout left lower extremity              Diagnostic Tests:   Lab Results (last 24 hours)     Procedure Component Value Units Date/Time    Hemoglobin &  Hematocrit, Blood [[id number]]  (Abnormal) Collected:  03/17/18 0426    Specimen:  Blood Updated:  03/17/18 0443     Hemoglobin 13.6 (L) g/dL      Hematocrit 39.8 (L) %     Potassium [[id number]]  (Abnormal) Collected:  03/16/18 1008    Specimen:  Blood Updated:  03/16/18 1044     Potassium 3.3 (L) mmol/L     POC Glucose Once [[id number]]  (Normal) Collected:  03/16/18 0923    Specimen:  Blood Updated:  03/16/18 0925     Glucose 110 mg/dL     Narrative:       Meter: [id number] : [id number] Rubin BLACKWELL KINJAL                  Assessment:Status post left hip replacement with postoperative urinary retention  Plan:    Continue efforts to mobilize  Continue pain control measures  Continue incisional Care    I spoke with the nurse and with family at length.  We'll consult urology for evaluation for urinary retention and patient understands he will likely need to go home with a catheter depending on urology's recommendations and will need to follow up with his urologist at home regarding this.    Plan is for discharge home today after seen by urology    Date: 3/17/2018  Time: 9:24 AM    Erik Main MD

## 2018-03-29 ENCOUNTER — OFFICE VISIT (OUTPATIENT)
Dept: ORTHOPEDIC SURGERY | Facility: CLINIC | Age: 73
End: 2018-03-29

## 2018-03-29 VITALS — WEIGHT: 170 LBS | BODY MASS INDEX: 25.18 KG/M2 | HEIGHT: 69 IN | TEMPERATURE: 97.5 F

## 2018-03-29 DIAGNOSIS — Z96.642 STATUS POST TOTAL REPLACEMENT OF LEFT HIP: Primary | ICD-10-CM

## 2018-03-29 PROCEDURE — 99024 POSTOP FOLLOW-UP VISIT: CPT | Performed by: ORTHOPAEDIC SURGERY

## 2018-03-29 PROCEDURE — 73502 X-RAY EXAM HIP UNI 2-3 VIEWS: CPT | Performed by: ORTHOPAEDIC SURGERY

## 2018-03-29 NOTE — PROGRESS NOTES
Coleman Bear : 1945 MRN: [id number] DATE: 3/29/2018    DIAGNOSIS: 2 week follow up left total hip     SUBJECTIVE:Patient returns today for 2 week follow up of left total hip replacement. Patient reports doing well with no unusual complaints. Appears to be progressing appropriately.    OBJECTIVE:   Exam:. The incision is healing appropriately. No sign of infection. Range of motion is progressing as expected. The calf is soft and nontender with a negative Homans sign.    DIAGNOSTIC STUDIES  Xrays: 2 views of the left hip (AP pelvis and lateral left hip) were ordered and reviewed for evaluation of recent hip replacement. They demonstrate a well positioned, well aligned hip replacement without complicating factors noted. In comparison with previous films there has been interval implant placement.    ASSESSMENT: 2 week status post left hip replacement.    PLAN: 1) Staples removed and steri strips applied   2) PT exercises   3) Discontinue CONSTANTINE hose   4) Continue ice PRN   5) WBAT   6) aspirin 325 mg orally every day for 1 month   7) Follow up in 6 weeks with repeat Xrays of left hip (2views)    Kris Pal MD  3/29/2018

## 2018-05-10 ENCOUNTER — OFFICE VISIT (OUTPATIENT)
Dept: ORTHOPEDIC SURGERY | Facility: CLINIC | Age: 73
End: 2018-05-10

## 2018-05-10 VITALS — TEMPERATURE: 98.2 F | WEIGHT: 170 LBS | HEIGHT: 69 IN | BODY MASS INDEX: 25.18 KG/M2

## 2018-05-10 DIAGNOSIS — Z96.642 STATUS POST TOTAL REPLACEMENT OF LEFT HIP: Primary | ICD-10-CM

## 2018-05-10 PROCEDURE — 99024 POSTOP FOLLOW-UP VISIT: CPT | Performed by: NURSE PRACTITIONER

## 2018-05-10 NOTE — PROGRESS NOTES
Coleman Bear : 1945 MRN: [id number] DATE: 5/10/2018    DIAGNOSIS: 8 week follow up left total hip     SUBJECTIVE:Patient returns today for 8 week follow up of left total hip replacement. Patient reports doing well with no unusual complaints. Appears to be progressing appropriately and is off a cane    OBJECTIVE:   Exam:. The incision is healed. No sign of infection. Range of motion is progressing as expected. The calf is soft and nontender with a negative Homans sign. Strength progressing    ASSESSMENT: 8 week status post left hip replacement.    PLAN: 1) Activity as tolerated   2) Continue hip strengthening exercises    3) Follow up 1 year post-op with repeat Xrays of left hip (2views AP Pelvis      and lateral left hip)    Amy Nelson, APRN  5/10/2018

## 2019-03-15 ENCOUNTER — OFFICE VISIT (OUTPATIENT)
Dept: ORTHOPEDIC SURGERY | Facility: CLINIC | Age: 74
End: 2019-03-15

## 2019-03-15 VITALS — TEMPERATURE: 97.9 F | BODY MASS INDEX: 28.45 KG/M2 | HEIGHT: 66 IN | WEIGHT: 177 LBS

## 2019-03-15 DIAGNOSIS — G89.29 HIP PAIN, CHRONIC, RIGHT: ICD-10-CM

## 2019-03-15 DIAGNOSIS — Z96.642 STATUS POST TOTAL REPLACEMENT OF LEFT HIP: Primary | ICD-10-CM

## 2019-03-15 DIAGNOSIS — M25.551 HIP PAIN, CHRONIC, RIGHT: ICD-10-CM

## 2019-03-15 PROCEDURE — 99214 OFFICE O/P EST MOD 30 MIN: CPT | Performed by: ORTHOPAEDIC SURGERY

## 2019-03-15 PROCEDURE — 73521 X-RAY EXAM HIPS BI 2 VIEWS: CPT | Performed by: ORTHOPAEDIC SURGERY

## 2019-03-15 NOTE — PROGRESS NOTES
Patient: Coleman Bear  YOB: 1945 73 y.o. male  Medical Record Number: [id number]    Chief Complaints:   Chief Complaint   Patient presents with   • Right Hip - Follow-up       History of Present Illness:Coleman Bear is a 73 y.o. male who presents with complaints of right hip region pain.  The pain is mostly located in the lateral right hip and buttock.  Is been going on for at least a month.  He described as a moderate constant stabbing burning type pain he will have some numbness associated with it in the buttock region as well but it does not radiate distally to the knee or beyond.  He had a previous left total hip replacement a year ago and that has done well and he has no complaints.  He does have a history of lumbar spine surgery multiple times by Dr. Galan.    Allergies: No Known Allergies    Medications:   Current Outpatient Medications   Medication Sig Dispense Refill   • acetaminophen (TYLENOL) 500 MG tablet Take 1,000 mg by mouth Every 6 (Six) Hours As Needed for Mild Pain .     • amLODIPine (NORVASC) 10 MG tablet Take 10 mg by mouth Every Morning.     • atorvastatin (LIPITOR) 20 MG tablet Take 20 mg by mouth Every Morning.     • IBUPROFEN PO Take 200 mg by mouth As Needed.     • triamterene-hydrochlorothiazide (DYAZIDE) 37.5-25 MG per capsule Take 1 capsule by mouth every morning.     • ondansetron (ZOFRAN) 4 MG tablet Take 1 tablet by mouth Every 6 (Six) Hours As Needed for Nausea or Vomiting for up to 10 doses. 10 tablet 0   • oxyCODONE-acetaminophen (PERCOCET) 5-325 MG per tablet 1-2 po q 4-6 hr prn pain 75 tablet 0     No current facility-administered medications for this visit.          The following portions of the patient's history were reviewed and updated as appropriate: allergies, current medications, past family history, past medical history, past social history, past surgical history and problem list.    Review of Systems:   A 14 point review of systems was performed.  "All systems negative except pertinent positives/negative listed in HPI above    Physical Exam:   Vitals:    03/15/19 1340   Temp: 97.9 °F (36.6 °C)   Weight: 80.3 kg (177 lb)   Height: 167.6 cm (66\")       General: A and O x 3, ASA, NAD    SCLERA:    Normal    DENTITION:   Normal  Hip:  right    LEG ALIGNMENT:     Neutral        LEG LENGTH DISCREPANCY   :    none    GAIT:     Antalgic    SKIN:     No abnormality    RANGE OF MOTION:     slightlyLimited by joint irritability    STRENGTH:    Slightly Limited by joint irratibility    DISTAL PULSES:    Paplable    DISTAL SENSATION :   Intact    LYMPHATICS:     No   lymphadenopathy    OTHER:          +/-   Stinchfeld test      -    log roll      -   Tenderness to palpation trochanteric bursa        Radiology:  Xrays 2views right hip (AP bilateral hips, and lateral of the hip) ordered and reviewed for evaluation of hip pain  demonstrating  moderate joint space narrowing and periarticular calcification. In comparison to previous films there are no changes    Assessment/Plan: Right posterior hip region pain is difficult to differentiate overlap between the back and hip.  I am going to send him for a fluoroscopy guided cortisone injection.  We will see how he responds I like for him to call me back a week or 2 after the injection and will make decisions on where we go next based on whether or not he responds to the injection in the hip      Kris Pal MD  3/15/2019  "

## 2019-03-26 ENCOUNTER — HOSPITAL ENCOUNTER (OUTPATIENT)
Dept: GENERAL RADIOLOGY | Facility: HOSPITAL | Age: 74
Discharge: HOME | End: 2019-03-26
Admitting: RADIOLOGY

## 2019-03-26 DIAGNOSIS — M25.551 HIP PAIN, CHRONIC, RIGHT: ICD-10-CM

## 2019-03-26 DIAGNOSIS — G89.29 HIP PAIN, CHRONIC, RIGHT: ICD-10-CM

## 2019-03-26 PROCEDURE — 25010000002 IOPAMIDOL 61 % SOLUTION: Performed by: RADIOLOGY

## 2019-03-26 PROCEDURE — 77002 NEEDLE LOCALIZATION BY XRAY: CPT

## 2019-03-26 PROCEDURE — 25010000002 METHYLPREDNISOLONE PER 125 MG: Performed by: RADIOLOGY

## 2019-03-26 RX ORDER — LIDOCAINE HYDROCHLORIDE 10 MG/ML
10 INJECTION, SOLUTION INFILTRATION; PERINEURAL ONCE
Status: COMPLETED | OUTPATIENT
Start: 2019-03-26 | End: 2019-03-26

## 2019-03-26 RX ORDER — BUPIVACAINE HYDROCHLORIDE 2.5 MG/ML
10 INJECTION, SOLUTION EPIDURAL; INFILTRATION; INTRACAUDAL ONCE
Status: COMPLETED | OUTPATIENT
Start: 2019-03-26 | End: 2019-03-26

## 2019-03-26 RX ORDER — METHYLPREDNISOLONE SODIUM SUCCINATE 125 MG/2ML
80 INJECTION, POWDER, LYOPHILIZED, FOR SOLUTION INTRAMUSCULAR; INTRAVENOUS
Status: COMPLETED | OUTPATIENT
Start: 2019-03-26 | End: 2019-03-26

## 2019-03-26 RX ADMIN — IOPAMIDOL 1 ML: 612 INJECTION, SOLUTION INTRAVENOUS at 11:30

## 2019-03-26 RX ADMIN — LIDOCAINE HYDROCHLORIDE 2 ML: 10 INJECTION, SOLUTION INFILTRATION; PERINEURAL at 11:30

## 2019-03-26 RX ADMIN — BUPIVACAINE HYDROCHLORIDE 5 ML: 2.5 INJECTION, SOLUTION EPIDURAL; INFILTRATION; INTRACAUDAL; PERINEURAL at 11:30

## 2019-03-26 RX ADMIN — METHYLPREDNISOLONE SODIUM SUCCINATE 80 MG: 125 INJECTION, POWDER, LYOPHILIZED, FOR SOLUTION INTRAMUSCULAR; INTRAVENOUS at 11:30

## 2019-09-18 ENCOUNTER — OFFICE VISIT (OUTPATIENT)
Dept: ORTHOPEDIC SURGERY | Facility: CLINIC | Age: 74
End: 2019-09-18

## 2019-09-18 VITALS — WEIGHT: 165 LBS | HEIGHT: 69 IN | BODY MASS INDEX: 24.44 KG/M2 | TEMPERATURE: 98.1 F

## 2019-09-18 DIAGNOSIS — Z98.1 HISTORY OF LUMBAR FUSION: Primary | ICD-10-CM

## 2019-09-18 DIAGNOSIS — M54.50 LUMBAR BACK PAIN: ICD-10-CM

## 2019-09-18 PROCEDURE — 72100 X-RAY EXAM L-S SPINE 2/3 VWS: CPT | Performed by: ORTHOPAEDIC SURGERY

## 2019-09-18 PROCEDURE — 99213 OFFICE O/P EST LOW 20 MIN: CPT | Performed by: ORTHOPAEDIC SURGERY

## 2019-09-18 RX ORDER — METHYLPREDNISOLONE 4 MG/1
TABLET ORAL
Qty: 21 TABLET | Refills: 0 | Status: SHIPPED | OUTPATIENT
Start: 2019-09-18 | End: 2020-09-03

## 2019-09-18 NOTE — PROGRESS NOTES
He is having some recurrent right-sided inter-crestal pain ongoing to 3 months without radiation numbness tingling or weakness.  On exam excellent strength in lower extremities bilaterally straight leg raise is negative.  Did not test reflexes.  Mild tenderness to palpation to the right of the intercostal midline no palpable deformity or masses.  Plain film x-rays show a solid fusion going up to L2-3 the latter level which has continued hardware in place.  I do not see anything to go after at present we will try a Dosepak and some physical therapy and if he fails to improve he will call him to schedule MRI scanning.

## 2020-07-07 ENCOUNTER — OFFICE VISIT (OUTPATIENT)
Dept: ORTHOPEDIC SURGERY | Facility: CLINIC | Age: 75
End: 2020-07-07

## 2020-07-07 VITALS — HEIGHT: 69 IN | TEMPERATURE: 97.6 F | BODY MASS INDEX: 24.44 KG/M2 | WEIGHT: 165 LBS

## 2020-07-07 DIAGNOSIS — M25.551 RIGHT HIP PAIN: ICD-10-CM

## 2020-07-07 DIAGNOSIS — M16.11 PRIMARY OSTEOARTHRITIS OF RIGHT HIP: ICD-10-CM

## 2020-07-07 DIAGNOSIS — Z96.642 STATUS POST TOTAL REPLACEMENT OF LEFT HIP: Primary | ICD-10-CM

## 2020-07-07 PROCEDURE — 73521 X-RAY EXAM HIPS BI 2 VIEWS: CPT | Performed by: ORTHOPAEDIC SURGERY

## 2020-07-07 PROCEDURE — 99214 OFFICE O/P EST MOD 30 MIN: CPT | Performed by: ORTHOPAEDIC SURGERY

## 2020-07-07 RX ORDER — PREGABALIN 75 MG/1
150 CAPSULE ORAL ONCE
Status: CANCELLED | OUTPATIENT
Start: 2020-09-16 | End: 2020-07-07

## 2020-07-07 RX ORDER — CEFAZOLIN SODIUM 2 G/100ML
2 INJECTION, SOLUTION INTRAVENOUS ONCE
Status: CANCELLED | OUTPATIENT
Start: 2020-09-16 | End: 2020-07-07

## 2020-07-07 RX ORDER — MELOXICAM 15 MG/1
15 TABLET ORAL ONCE
Status: CANCELLED | OUTPATIENT
Start: 2020-09-16 | End: 2020-07-07

## 2020-07-07 RX ORDER — VANCOMYCIN HYDROCHLORIDE 1 G/200ML
15 INJECTION, SOLUTION INTRAVENOUS ONCE
Status: CANCELLED | OUTPATIENT
Start: 2020-09-16 | End: 2020-07-07

## 2020-07-07 NOTE — PROGRESS NOTES
Patient: Coleman Bear  YOB: 1945 75 y.o. male  Medical Record Number: [id number]    Chief Complaint:   Chief Complaint   Patient presents with   • Left Hip - Follow-up       History of Present Illness:Coleman Bear is a 75 y.o. male who presents for follow-up of right hip.  He had a left total hip 2 years ago had some mild aching within the right groin at that time.  Over the past year or 2 he has had a couple injections the last one helped for couple months but his pain is markedly worsened over the last year to the point where he is limited in his basic activities of daily living.  He has a severe stabbing in the groin worse with activity.  He can only walk short distances.  His left hip is doing well without complaints.    Allergies: No Known Allergies    Medications:   Current Outpatient Medications   Medication Sig Dispense Refill   • acetaminophen (TYLENOL) 500 MG tablet Take 1,000 mg by mouth Every 6 (Six) Hours As Needed for Mild Pain .     • amLODIPine (NORVASC) 10 MG tablet Take 10 mg by mouth Every Morning.     • atorvastatin (LIPITOR) 20 MG tablet Take 20 mg by mouth Every Morning.     • IBUPROFEN PO Take 200 mg by mouth As Needed.     • triamterene-hydrochlorothiazide (DYAZIDE) 37.5-25 MG per capsule Take 1 capsule by mouth every morning.     • methylPREDNISolone (MEDROL, MALATHI,) 4 MG tablet Use as directed by package instructions 21 tablet 0   • ondansetron (ZOFRAN) 4 MG tablet Take 1 tablet by mouth Every 6 (Six) Hours As Needed for Nausea or Vomiting for up to 10 doses. 10 tablet 0   • oxyCODONE-acetaminophen (PERCOCET) 5-325 MG per tablet 1-2 po q 4-6 hr prn pain 75 tablet 0     No current facility-administered medications for this visit.          The following portions of the patient's history were reviewed and updated as appropriate: allergies, current medications, past family history, past medical history, past social history, past surgical history and problem list.    Review  "of Systems:   A 14 point review of systems was performed. All systems negative except pertinent positives/negative listed in HPI above    Physical Exam:   Vitals:    07/07/20 0953   Temp: 97.6 °F (36.4 °C)   TempSrc: Oral   Weight: 74.8 kg (165 lb)   Height: 175.3 cm (69\")   PainSc:   8   PainLoc: Hip       General: A and O x 3, ASA, NAD    SCLERA:    Normal    DENTITION:   Normal  Hip:  right    LEG ALIGNMENT:     Neutral        LEG LENGTH DISCREPANCY   :    none    GAIT:     Antalgic    SKIN:     No abnormality    RANGE OF MOTION:     Limited by joint irritability    STRENGTH:     Limited by joint irratibility    DISTAL PULSES:    Paplable    DISTAL SENSATION :   Intact    LYMPHATICS:     No   lymphadenopathy    OTHER:          +   Stinchfeld test      -    log roll      -   Tenderness to palpation trochanteric bursa       Radiology:    Xrays 2viewsboth hips (AP bilateral hips and lateral hip) were ordered and reviewed for evaluation of hip pain demonstrating advanced, end-satge osteoarthritis with bone on bone articluation, periarticular osteophytes, and subchondral cysts - right hip, left hip well positioned DHRUV  Comparison views: todays xrays were compared to previous xrays and show new findings as described above -  progression of hip oa    Assessment/Plan:  Right hip end-stage osteoarthritis  Continuation of conservative management vs. DHRUV discussed.  The patient wishes to proceed with total hip replacement.  At this point the patient has failed the full gamut of conservative treatment and stating complete understanding of the risks/benefits/ anternatives wishes to proceed with surgical treatment.    Risk and benefits of surgery were reviewed.  Including, but not limited to, blood clots, anesthesia risk, infection, leg length discrepancy, fracture, skin/leg numbness, failure of the implant, need for future surgeries, continued pain, hematoma, need for transfusion, and death, among others.  The patient " understands and wishes to proceed.     The spectrum of treatment options were discussed with the patient in detail including both the nonoperative and operative treatment modalities and their respective risks and benefits.  After thorough discussion, the patient has elected to undergo surgical treatment.  The details of the surgical procedure were explained including the location of probable incisions and a description of the likely implants to be used.  Models and diagrams were used as educational resources. The patient understands the likely convalescence after surgery, as well as the rehabilitation required.  We thoroughly discussed the risks, benefits, and alternatives to surgery.  The risks include but are not limited to the risk of infection, joint stiffness, blood clots (including DVT and/or pulmonary embolus along with the risk of death), neurologic and/or vascular injury, fracture, dislocation, nonunion, malunion, need for further surgery including hardware failure requiring revision, and continued pain.  It was explained that if tissue has been repaired or reconstructed, there is also a chance of failure which may require further management.  Following the completion of the discussion, the patient expressed understanding of this planned course of care, all their questions were answered and consent will be obtained preoperatively.    Operative Plan: Posterior approach Total Hip Replacement 23 HR STAY        Kris Pal MD  7/7/2020

## 2020-07-13 PROBLEM — M16.11 PRIMARY OSTEOARTHRITIS OF RIGHT HIP: Status: ACTIVE | Noted: 2020-07-13

## 2020-09-01 ENCOUNTER — TRANSCRIBE ORDERS (OUTPATIENT)
Dept: PREADMISSION TESTING | Facility: HOSPITAL | Age: 75
End: 2020-09-01

## 2020-09-01 DIAGNOSIS — Z01.818 OTHER SPECIFIED PRE-OPERATIVE EXAMINATION: Primary | ICD-10-CM

## 2020-09-03 ENCOUNTER — APPOINTMENT (OUTPATIENT)
Dept: PREADMISSION TESTING | Facility: HOSPITAL | Age: 75
End: 2020-09-03

## 2020-09-03 VITALS
OXYGEN SATURATION: 99 % | HEART RATE: 67 BPM | HEIGHT: 69 IN | SYSTOLIC BLOOD PRESSURE: 148 MMHG | RESPIRATION RATE: 16 BRPM | DIASTOLIC BLOOD PRESSURE: 88 MMHG | TEMPERATURE: 97.2 F | BODY MASS INDEX: 25.03 KG/M2 | WEIGHT: 169 LBS

## 2020-09-03 DIAGNOSIS — M16.11 PRIMARY OSTEOARTHRITIS OF RIGHT HIP: ICD-10-CM

## 2020-09-03 LAB
ANION GAP SERPL CALCULATED.3IONS-SCNC: 12.8 MMOL/L (ref 5–15)
BILIRUB UR QL STRIP: NEGATIVE
BUN SERPL-MCNC: 13 MG/DL (ref 8–23)
BUN/CREAT SERPL: 13 (ref 7–25)
CALCIUM SPEC-SCNC: 10 MG/DL (ref 8.6–10.5)
CHLORIDE SERPL-SCNC: 100 MMOL/L (ref 98–107)
CLARITY UR: CLEAR
CO2 SERPL-SCNC: 25.2 MMOL/L (ref 22–29)
COLOR UR: YELLOW
CREAT SERPL-MCNC: 1 MG/DL (ref 0.76–1.27)
DEPRECATED RDW RBC AUTO: 39.8 FL (ref 37–54)
ERYTHROCYTE [DISTWIDTH] IN BLOOD BY AUTOMATED COUNT: 12.7 % (ref 12.3–15.4)
GFR SERPL CREATININE-BSD FRML MDRD: 73 ML/MIN/1.73
GLUCOSE SERPL-MCNC: 116 MG/DL (ref 65–99)
GLUCOSE UR STRIP-MCNC: NEGATIVE MG/DL
HCT VFR BLD AUTO: 47.3 % (ref 37.5–51)
HGB BLD-MCNC: 15.9 G/DL (ref 13–17.7)
HGB UR QL STRIP.AUTO: NEGATIVE
KETONES UR QL STRIP: NEGATIVE
LEUKOCYTE ESTERASE UR QL STRIP.AUTO: NEGATIVE
MCH RBC QN AUTO: 29.2 PG (ref 26.6–33)
MCHC RBC AUTO-ENTMCNC: 33.6 G/DL (ref 31.5–35.7)
MCV RBC AUTO: 86.8 FL (ref 79–97)
NITRITE UR QL STRIP: NEGATIVE
PH UR STRIP.AUTO: 8.5 [PH] (ref 5–8)
PLATELET # BLD AUTO: 228 10*3/MM3 (ref 140–450)
PMV BLD AUTO: 10.1 FL (ref 6–12)
POTASSIUM SERPL-SCNC: 3.4 MMOL/L (ref 3.5–5.2)
PROT UR QL STRIP: NEGATIVE
RBC # BLD AUTO: 5.45 10*6/MM3 (ref 4.14–5.8)
SODIUM SERPL-SCNC: 138 MMOL/L (ref 136–145)
SP GR UR STRIP: <=1.005 (ref 1–1.03)
UROBILINOGEN UR QL STRIP: ABNORMAL
WBC # BLD AUTO: 6.77 10*3/MM3 (ref 3.4–10.8)

## 2020-09-03 PROCEDURE — A9270 NON-COVERED ITEM OR SERVICE: HCPCS | Performed by: ORTHOPAEDIC SURGERY

## 2020-09-03 PROCEDURE — 36415 COLL VENOUS BLD VENIPUNCTURE: CPT

## 2020-09-03 PROCEDURE — 93010 ELECTROCARDIOGRAM REPORT: CPT | Performed by: INTERNAL MEDICINE

## 2020-09-03 PROCEDURE — 85027 COMPLETE CBC AUTOMATED: CPT | Performed by: ORTHOPAEDIC SURGERY

## 2020-09-03 PROCEDURE — 63710000001 MUPIROCIN 2 % OINTMENT: Performed by: ORTHOPAEDIC SURGERY

## 2020-09-03 PROCEDURE — 93005 ELECTROCARDIOGRAM TRACING: CPT

## 2020-09-03 PROCEDURE — 80048 BASIC METABOLIC PNL TOTAL CA: CPT | Performed by: ORTHOPAEDIC SURGERY

## 2020-09-03 PROCEDURE — 81003 URINALYSIS AUTO W/O SCOPE: CPT | Performed by: ORTHOPAEDIC SURGERY

## 2020-09-03 RX ORDER — CHLORHEXIDINE GLUCONATE 500 MG/1
CLOTH TOPICAL TAKE AS DIRECTED
COMMUNITY
End: 2020-09-17 | Stop reason: HOSPADM

## 2020-09-03 ASSESSMENT — HOOS JR
HOOS JR SCORE: 16
HOOS JR SCORE: 39.902

## 2020-09-03 NOTE — DISCHARGE INSTRUCTIONS
CHLORHEXIDINE CLOTH INSTRUCTIONS  The morning of surgery follow these instructions using the Chlorhexidine cloths you've been given.  These steps reduce bacteria on the body.  Do not use the cloths near your eyes, ears mouth, genitalia or on open wounds.  Throw the cloths away after use but do not try to flush them down a toilet.      • Open and remove one cloth at a time from the package.    • Leave the cloth unfolded and begin the bathing.  • Massage the skin with the cloths using gentle pressure to remove bacteria.  Do not scrub harshly.   • Follow the steps below with one 2% CHG cloth per area (6 total cloths).  • One cloth for neck, shoulders and chest.  • One cloth for both arms, hands, fingers and underarms (do underarms last).  • One cloth for the abdomen followed by groin.  • One cloth for right leg and foot including between the toes.  • One cloth for left leg and foot including between the toes.  • The last cloth is to be used for the back of the neck, back and buttocks.    Allow the CHG to air dry 3 minutes on the skin which will give it time to work and decrease the chance of irritation.  The skin may feel sticky until it is dry.  Do not rinse with water or any other liquid or you will lose the beneficial effects of the CHG.  If mild skin irritation occurs, do rinse the skin to remove the CHG.  Report this to the nurse at time of admission.  Do not apply lotions, creams, ointments, deodorants or perfumes after using the clothes. Dress in clean clothes before coming to the hospital.    BACTROBAN NASAL OINTMENT  There are many germs normally in your nose. Bactroban is an ointment that will help reduce these germs. Please follow these instructions for Bactroban use:      __1__The day before surgery in the morning  Date__9/15______    __2__The day before surgery in the evening              Date__9/15______    ___3_The day of surgery in the morning    Date__9/16______    **Squirt ½ package of Bactroban  Ointment onto a cotton applicator and apply to inside of 1st nostril.  Squirt the remaining Bactroban and apply to the inside of the other nostril.    Take the following medications the morning of surgery:  AMLODIPINE    ARRIVAL TIME TO MAIN SURGERY ON 9/16/20 WILL BE GIVEN TO YOU BY DR. LARSON'S OFFICE      If you are on prescription narcotic pain medication to control your pain you may also take that medication the morning of surgery.    General Instructions:  • Do not eat solid food after midnight the night before surgery.  • You may drink clear liquids day of surgery but must stop at least one hour before your hospital arrival time.  • It is beneficial for you to have a clear drink that contains carbohydrates the day of surgery.  We suggest a 12 to 20 ounce bottle of Gatorade or Powerade for non-diabetic patients or a 12 to 20 ounce bottle of G2 or Powerade Zero for diabetic patients. (Pediatric patients, are not advised to drink a 12 to 20 ounce carbohydrate drink)    Clear liquids are liquids you can see through.  Nothing red in color.     Plain water                               Sports drinks  Sodas                                   Gelatin (Jell-O)  Fruit juices without pulp such as white grape juice and apple juice  Popsicles that contain no fruit or yogurt  Tea or coffee (no cream or milk added)  Gatorade / Powerade  G2 / Powerade Zero    • Patients who avoid smoking, chewing tobacco and alcohol for 4 weeks prior to surgery have a reduced risk of post-operative complications.  Quit smoking as many days before surgery as you can.  • Do not smoke, use chewing tobacco or drink alcohol the day of surgery.   • If applicable bring your C-PAP/ BI-PAP machine.  • Bring any papers given to you in the doctor’s office.  • Wear clean comfortable clothes.  • Do not wear contact lenses, false eyelashes or make-up.  Bring a case for your glasses.   • Bring crutches or walker if applicable.  • Remove all piercings.  Leave  jewelry and any other valuables at home.  • Hair extensions with metal clips must be removed prior to surgery.  • The Pre-Admission Testing nurse will instruct you to bring medications if unable to obtain an accurate list in Pre-Admission Testing.          Preventing a Surgical Site Infection:  • For 2 to 3 days before surgery, avoid shaving with a razor because the razor can irritate skin and make it easier to develop an infection.    • Any areas of open skin can increase the risk of a post-operative wound infection by allowing bacteria to enter and travel throughout the body.  Notify your surgeon if you have any skin wounds / rashes even if it is not near the expected surgical site.  The area will need assessed to determine if surgery should be delayed until it is healed.  • The night prior to surgery shower using a fresh bar of anti-bacterial soap (such as Dial) and clean washcloth.  Sleep in a clean bed with clean clothing.  Do not allow pets to sleep with you.  • Shower on the morning of surgery using a fresh bar of anti-bacterial soap (such as Dial) and clean washcloth.  Dry with a clean towel and dress in clean clothing.  • Ask your surgeon if you will be receiving antibiotics prior to surgery.  • Make sure you, your family, and all healthcare providers clean their hands with soap and water or an alcohol based hand  before caring for you or your wound.    Day of surgery:  Your arrival time is approximately two hours before your scheduled surgery time.  Upon arrival, a Pre-op nurse and Anesthesiologist will review your health history, obtain vital signs, and answer questions you may have.  The only belongings needed at this time will be a list of your home medications and if applicable your C-PAP/BI-PAP machine.  If you are staying overnight your family can leave the rest of your belongings in the car and bring them to your room later.  A Pre-op nurse will start an IV and you may receive medication in  preparation for surgery, including something to help you relax.  Your family will be able to see you in the Pre-op area.  Two visitors at a time will be allowed in the Pre-op room.  While you are in surgery your family should notify the waiting room  if they leave the waiting room area and provide a contact phone number.    Please be aware that surgery does come with discomfort.  We want to make every effort to control your discomfort so please discuss any uncontrolled symptoms with your nurse.   Your doctor will most likely have prescribed pain medications.      If you are going home after surgery you will receive individualized written care instructions before being discharged.  A responsible adult must drive you to and from the hospital on the day of your surgery and stay with you for 24 hours.    If you are staying overnight following surgery, you will be transported to your hospital room following the recovery period.  [facility] has all private rooms.    If you have any questions please call Pre-Admission Testing at [phone].  Deductibles and co-payments are collected on the day of service. Please be prepared to pay the required co-pay, deductible or deposit on the day of service as defined by your plan.    Patient Education for Self-Quarantine Process    Following your COVID testing, we strongly recommend that you do not leave your home after you have been tested for COVID except to get medical care. This includes not going to work, school or to public areas.  If this is not possible for you to do please limit your activities to only required outings.  Be sure to wear a mask when you are with other people, practice social distancing and wash your hands frequently.      The following items provide additional details to keep you safe.  • Wash your hands with soap and water frequently for at least 20 seconds.   • Avoid touching your eyes, nose and mouth with unwashed hands.  • Do  not share anything - utensils, towels, food from the same bowl.   • Have your own utensils, drinking glass, dishes, towels and bedding.   • Do not have visitors.   • Do use FaceTime to stay in touch with family and friends.  • You should stay in a specific room away from others if possible.   • Stay at least 6 feet away from others in the home if you cannot have a dedicated room to yourself.   • Do not snuggle with your pet. While the CDC says there is no evidence that pets can spread COVID-19 or be infected from humans, it is probably best to avoid “petting, snuggling, being kissed or licked and sharing food (during self-quarantine)”, according to the CDC.   • Sanitize household surfaces daily. Include all high touch areas (door handles, light switches, phones, countertops, etc.)  • Do not share a bathroom with others, if possible.   • Wear a mask around others in your home if you are unable to stay in a separate room or 6 feet apart. If  you are unable to wear a mask, have your family member wear a mask if they must be within 6 feet of you.   Call your surgeon immediately if you experience any of the following symptoms:  • Sore Throat  • Shortness of Breath or difficulty breathing  • Cough  • Chills  • Body soreness or muscle pain  • Headache  • Fever  • New loss of taste or smell  • Do not arrive for your surgery ill.  Your procedure will need to be rescheduled to another time.  You will need to call your physician before the day of surgery to avoid any unnecessary exposure to hospital staff as well as other patients.

## 2020-09-10 ENCOUNTER — OFFICE VISIT (OUTPATIENT)
Dept: ORTHOPEDIC SURGERY | Facility: CLINIC | Age: 75
End: 2020-09-10

## 2020-09-10 VITALS
HEART RATE: 59 BPM | HEIGHT: 69 IN | TEMPERATURE: 98.6 F | SYSTOLIC BLOOD PRESSURE: 132 MMHG | DIASTOLIC BLOOD PRESSURE: 71 MMHG | WEIGHT: 171 LBS | BODY MASS INDEX: 25.33 KG/M2

## 2020-09-10 DIAGNOSIS — M25.551 RIGHT HIP PAIN: Primary | ICD-10-CM

## 2020-09-10 PROCEDURE — S0260 H&P FOR SURGERY: HCPCS | Performed by: NURSE PRACTITIONER

## 2020-09-10 NOTE — H&P (VIEW-ONLY)
Patient: Coleman Bear    Date of Admission: 9/16/2020    YOB: 1945    Medical Record Number: [id number]    Admitting Physician: Dr. Kris Pal    Reason for Admission: End Stage Right Hip OA    History of Present Illness: 75 y.o. male presents with severe end stage hip osteoarthritis which has not been responsive to the full compliment of conservative measures. Despite conservative attempts, there is still severe, constant activity limiting hip pain. Given the severity of the pain, the patient has elected to proceed with hip replacement.    Allergies: No Known Allergies      Current Medications:  Home Medications:    Current Outpatient Medications on File Prior to Visit   Medication Sig   • acetaminophen (TYLENOL) 500 MG tablet Take 1,000 mg by mouth Every 6 (Six) Hours As Needed for Mild Pain .   • amLODIPine (NORVASC) 10 MG tablet Take 10 mg by mouth Every Morning.   • atorvastatin (LIPITOR) 20 MG tablet Take 20 mg by mouth Every Morning.   • Chlorhexidine Gluconate Cloth 2 % pads Apply  topically Take As Directed. PRIOR TO SURGERY   • IBUPROFEN PO Take 200 mg by mouth As Needed. HELD FOR SURGERY   • mupirocin (Bactroban Nasal) 2 % nasal ointment into the nostril(s) as directed by provider Take As Directed. PRIOR TO SURGERY   • triamterene-hydrochlorothiazide (MAXZIDE) 75-50 MG per tablet Take 1 capsule by mouth Every Morning.     Current Facility-Administered Medications on File Prior to Visit   Medication   • Chlorhexidine Gluconate 2 % pads 2 each     PRN Meds:.    PMH:  Past Medical History:   Diagnosis Date   • Arthritis    • BPH (benign prostatic hyperplasia)    • Chronic pain disorder    • High cholesterol    • Hip pain, right    • HTN (hypertension)    • Low back pain    • Lumbar stenosis     ABOVE THE L3-5 FUSION   • Postoperative urinary retention     REQUIRES CATHETER REMAINING FOR DAYS POST OP PER UROLOGY   • Shoulder pain         PSURGH:  Past Surgical History:   Procedure  "Laterality Date   • ANTERIOR CERVICAL DISCECTOMY W/ FUSION      LIMITED RANGE OF MOTION   • HERNIA REPAIR      ing   • LUMBAR DISCECTOMY  07/24/1992    AND FUSION   • LUMBAR DISCECTOMY FUSION INSTRUMENTATION N/A 2/16/2017    Procedure: L2-L3 repeat laminectomy and fusion with instrumentation, removal instrumentation L3 to L5;  Surgeon: Cameron Mcneil MD;  Location: [facility];  Service:    • LUMBAR EPIDURAL INJECTION     • LUMBAR LAMINECTOMY WITH FUSION  08/24/2006   • PROSTATE SURGERY     • SHOULDER ARTHROSCOPY W/ SUBACROMIAL DECOMPRESSION AND DISTAL CLAVICLE EXCISION Right 03/04/2011    AND EXTENSIVE ROTATOR CUFF DEBRIDEMENT   • TOTAL HIP ARTHROPLASTY Left 3/16/2018    Procedure: TOTAL HIP ARTHROPLASTY;  Surgeon: Kris Pal MD;  Location: [facility] OR;  Service: Orthopedics       SocialHx:  Social History     Occupational History   • Not on file   Tobacco Use   • Smoking status: Never Smoker   • Smokeless tobacco: Never Used   Substance and Sexual Activity   • Alcohol use: No   • Drug use: No   • Sexual activity: Not on file      Social History     Social History Narrative   • Not on file       FamHx:  Family History   Problem Relation Age of Onset   • Hypertension Other    • Cancer Mother         breast   • Emphysema Father    • Asthma Father    • Malig Hyperthermia Neg Hx          Review of Systems:   A 14 point review of systems was performed, pertinent positives discussed above, all other systems are negative    Physical Exam: 75 y.o. male  Vital Signs :   Vitals:    09/10/20 1302   BP: 132/71   Pulse: 59   Temp: 98.6 °F (37 °C)   TempSrc: Temporal   Weight: 77.6 kg (171 lb)   Height: 175.3 cm (69.02\")   PainSc:   9   PainLoc: Hip     General: Alert and Oriented x 3, No acute distress.  Psych: mood and affect appropriate; recent and remote memory intact  Eyes: conjunctiva clear; pupils equally round and reactive, sclera nonicteric  CV: no peripheral edema  Resp: normal respiratory effort  Skin: no " rashes or wounds; normal turgor  Musculosketetal; pain with hip range of motion. Positive stinchfeld test. No trochanteric  Tenderness.  Vascular: palpable distal pulses    Labs:    Appointment on 09/03/2020   Component Date Value Ref Range Status   • Glucose 09/03/2020 116* 65 - 99 mg/dL Final   • BUN 09/03/2020 13  8 - 23 mg/dL Final   • Creatinine 09/03/2020 1.00  0.76 - 1.27 mg/dL Final   • Sodium 09/03/2020 138  136 - 145 mmol/L Final   • Potassium 09/03/2020 3.4* 3.5 - 5.2 mmol/L Final   • Chloride 09/03/2020 100  98 - 107 mmol/L Final   • CO2 09/03/2020 25.2  22.0 - 29.0 mmol/L Final   • Calcium 09/03/2020 10.0  8.6 - 10.5 mg/dL Final   • eGFR Non African Amer 09/03/2020 73  >60 mL/min/1.73 Final   • BUN/Creatinine Ratio 09/03/2020 13.0  7.0 - 25.0 Final   • Anion Gap 09/03/2020 12.8  5.0 - 15.0 mmol/L Final   • WBC 09/03/2020 6.77  3.40 - 10.80 10*3/mm3 Final   • RBC 09/03/2020 5.45  4.14 - 5.80 10*6/mm3 Final   • Hemoglobin 09/03/2020 15.9  13.0 - 17.7 g/dL Final   • Hematocrit 09/03/2020 47.3  37.5 - 51.0 % Final   • MCV 09/03/2020 86.8  79.0 - 97.0 fL Final   • MCH 09/03/2020 29.2  26.6 - 33.0 pg Final   • MCHC 09/03/2020 33.6  31.5 - 35.7 g/dL Final   • RDW 09/03/2020 12.7  12.3 - 15.4 % Final   • RDW-SD 09/03/2020 39.8  37.0 - 54.0 fl Final   • MPV 09/03/2020 10.1  6.0 - 12.0 fL Final   • Platelets 09/03/2020 228  140 - 450 10*3/mm3 Final   • Color, UA 09/03/2020 Yellow  Yellow, Straw Final   • Appearance, UA 09/03/2020 Clear  Clear Final   • pH, UA 09/03/2020 8.5* 5.0 - 8.0 Final   • Specific Gravity, UA 09/03/2020 <=1.005  1.005 - 1.030 Final   • Glucose, UA 09/03/2020 Negative  Negative Final   • Ketones, UA 09/03/2020 Negative  Negative Final   • Bilirubin, UA 09/03/2020 Negative  Negative Final   • Blood, UA 09/03/2020 Negative  Negative Final   • Protein, UA 09/03/2020 Negative  Negative Final   • Leuk Esterase, UA 09/03/2020 Negative  Negative Final   • Nitrite, UA 09/03/2020 Negative  Negative  Final   • Urobilinogen, UA 09/03/2020 0.2 E.U./dL  0.2 - 1.0 E.U./dL Final     Xrays:  Xrays AP pelvis and a lateral of the Right hip were reviewed demonstrating  End stage hip OA with bone on bone articulation, subchondral cysts and periarticular osteophytes.    Assessment:  End-stage Right hip osteoarthritis. Conservative measures have failed.      Plan:  The plan is to proceed with Right Total Hip Replacement. The patient voiced understanding of the risks, benefits, and alternative forms of treatment that were discussed with Dr Pal at the time of scheduling. 23     Amy Nelson, APRN  9/10/2020

## 2020-09-10 NOTE — H&P
Patient: Coleman Bear    Date of Admission: 9/16/2020    YOB: 1945    Medical Record Number: [id number]    Admitting Physician: Dr. Kris Pal    Reason for Admission: End Stage Right Hip OA    History of Present Illness: 75 y.o. male presents with severe end stage hip osteoarthritis which has not been responsive to the full compliment of conservative measures. Despite conservative attempts, there is still severe, constant activity limiting hip pain. Given the severity of the pain, the patient has elected to proceed with hip replacement.    Allergies: No Known Allergies      Current Medications:  Home Medications:    Current Outpatient Medications on File Prior to Visit   Medication Sig   • acetaminophen (TYLENOL) 500 MG tablet Take 1,000 mg by mouth Every 6 (Six) Hours As Needed for Mild Pain .   • amLODIPine (NORVASC) 10 MG tablet Take 10 mg by mouth Every Morning.   • atorvastatin (LIPITOR) 20 MG tablet Take 20 mg by mouth Every Morning.   • Chlorhexidine Gluconate Cloth 2 % pads Apply  topically Take As Directed. PRIOR TO SURGERY   • IBUPROFEN PO Take 200 mg by mouth As Needed. HELD FOR SURGERY   • mupirocin (Bactroban Nasal) 2 % nasal ointment into the nostril(s) as directed by provider Take As Directed. PRIOR TO SURGERY   • triamterene-hydrochlorothiazide (MAXZIDE) 75-50 MG per tablet Take 1 capsule by mouth Every Morning.     Current Facility-Administered Medications on File Prior to Visit   Medication   • Chlorhexidine Gluconate 2 % pads 2 each     PRN Meds:.    PMH:  Past Medical History:   Diagnosis Date   • Arthritis    • BPH (benign prostatic hyperplasia)    • Chronic pain disorder    • High cholesterol    • Hip pain, right    • HTN (hypertension)    • Low back pain    • Lumbar stenosis     ABOVE THE L3-5 FUSION   • Postoperative urinary retention     REQUIRES CATHETER REMAINING FOR DAYS POST OP PER UROLOGY   • Shoulder pain         PSURGH:  Past Surgical History:   Procedure  "Laterality Date   • ANTERIOR CERVICAL DISCECTOMY W/ FUSION      LIMITED RANGE OF MOTION   • HERNIA REPAIR      ing   • LUMBAR DISCECTOMY  07/24/1992    AND FUSION   • LUMBAR DISCECTOMY FUSION INSTRUMENTATION N/A 2/16/2017    Procedure: L2-L3 repeat laminectomy and fusion with instrumentation, removal instrumentation L3 to L5;  Surgeon: Cameron Mcneil MD;  Location: [facility];  Service:    • LUMBAR EPIDURAL INJECTION     • LUMBAR LAMINECTOMY WITH FUSION  08/24/2006   • PROSTATE SURGERY     • SHOULDER ARTHROSCOPY W/ SUBACROMIAL DECOMPRESSION AND DISTAL CLAVICLE EXCISION Right 03/04/2011    AND EXTENSIVE ROTATOR CUFF DEBRIDEMENT   • TOTAL HIP ARTHROPLASTY Left 3/16/2018    Procedure: TOTAL HIP ARTHROPLASTY;  Surgeon: Kris Pal MD;  Location: [facility] OR;  Service: Orthopedics       SocialHx:  Social History     Occupational History   • Not on file   Tobacco Use   • Smoking status: Never Smoker   • Smokeless tobacco: Never Used   Substance and Sexual Activity   • Alcohol use: No   • Drug use: No   • Sexual activity: Not on file      Social History     Social History Narrative   • Not on file       FamHx:  Family History   Problem Relation Age of Onset   • Hypertension Other    • Cancer Mother         breast   • Emphysema Father    • Asthma Father    • Malig Hyperthermia Neg Hx          Review of Systems:   A 14 point review of systems was performed, pertinent positives discussed above, all other systems are negative    Physical Exam: 75 y.o. male  Vital Signs :   Vitals:    09/10/20 1302   BP: 132/71   Pulse: 59   Temp: 98.6 °F (37 °C)   TempSrc: Temporal   Weight: 77.6 kg (171 lb)   Height: 175.3 cm (69.02\")   PainSc:   9   PainLoc: Hip     General: Alert and Oriented x 3, No acute distress.  Psych: mood and affect appropriate; recent and remote memory intact  Eyes: conjunctiva clear; pupils equally round and reactive, sclera nonicteric  CV: no peripheral edema  Resp: normal respiratory effort  Skin: no " rashes or wounds; normal turgor  Musculosketetal; pain with hip range of motion. Positive stinchfeld test. No trochanteric  Tenderness.  Vascular: palpable distal pulses    Labs:    Appointment on 09/03/2020   Component Date Value Ref Range Status   • Glucose 09/03/2020 116* 65 - 99 mg/dL Final   • BUN 09/03/2020 13  8 - 23 mg/dL Final   • Creatinine 09/03/2020 1.00  0.76 - 1.27 mg/dL Final   • Sodium 09/03/2020 138  136 - 145 mmol/L Final   • Potassium 09/03/2020 3.4* 3.5 - 5.2 mmol/L Final   • Chloride 09/03/2020 100  98 - 107 mmol/L Final   • CO2 09/03/2020 25.2  22.0 - 29.0 mmol/L Final   • Calcium 09/03/2020 10.0  8.6 - 10.5 mg/dL Final   • eGFR Non African Amer 09/03/2020 73  >60 mL/min/1.73 Final   • BUN/Creatinine Ratio 09/03/2020 13.0  7.0 - 25.0 Final   • Anion Gap 09/03/2020 12.8  5.0 - 15.0 mmol/L Final   • WBC 09/03/2020 6.77  3.40 - 10.80 10*3/mm3 Final   • RBC 09/03/2020 5.45  4.14 - 5.80 10*6/mm3 Final   • Hemoglobin 09/03/2020 15.9  13.0 - 17.7 g/dL Final   • Hematocrit 09/03/2020 47.3  37.5 - 51.0 % Final   • MCV 09/03/2020 86.8  79.0 - 97.0 fL Final   • MCH 09/03/2020 29.2  26.6 - 33.0 pg Final   • MCHC 09/03/2020 33.6  31.5 - 35.7 g/dL Final   • RDW 09/03/2020 12.7  12.3 - 15.4 % Final   • RDW-SD 09/03/2020 39.8  37.0 - 54.0 fl Final   • MPV 09/03/2020 10.1  6.0 - 12.0 fL Final   • Platelets 09/03/2020 228  140 - 450 10*3/mm3 Final   • Color, UA 09/03/2020 Yellow  Yellow, Straw Final   • Appearance, UA 09/03/2020 Clear  Clear Final   • pH, UA 09/03/2020 8.5* 5.0 - 8.0 Final   • Specific Gravity, UA 09/03/2020 <=1.005  1.005 - 1.030 Final   • Glucose, UA 09/03/2020 Negative  Negative Final   • Ketones, UA 09/03/2020 Negative  Negative Final   • Bilirubin, UA 09/03/2020 Negative  Negative Final   • Blood, UA 09/03/2020 Negative  Negative Final   • Protein, UA 09/03/2020 Negative  Negative Final   • Leuk Esterase, UA 09/03/2020 Negative  Negative Final   • Nitrite, UA 09/03/2020 Negative  Negative  Final   • Urobilinogen, UA 09/03/2020 0.2 E.U./dL  0.2 - 1.0 E.U./dL Final     Xrays:  Xrays AP pelvis and a lateral of the Right hip were reviewed demonstrating  End stage hip OA with bone on bone articulation, subchondral cysts and periarticular osteophytes.    Assessment:  End-stage Right hip osteoarthritis. Conservative measures have failed.      Plan:  The plan is to proceed with Right Total Hip Replacement. The patient voiced understanding of the risks, benefits, and alternative forms of treatment that were discussed with Dr Pal at the time of scheduling. 23     Amy Nelson, APRN  9/10/2020

## 2020-09-14 ENCOUNTER — LAB (OUTPATIENT)
Dept: LAB | Facility: HOSPITAL | Age: 75
End: 2020-09-14

## 2020-09-14 DIAGNOSIS — Z01.818 OTHER SPECIFIED PRE-OPERATIVE EXAMINATION: ICD-10-CM

## 2020-09-14 PROCEDURE — C9803 HOPD COVID-19 SPEC COLLECT: HCPCS

## 2020-09-14 PROCEDURE — U0004 COV-19 TEST NON-CDC HGH THRU: HCPCS

## 2020-09-15 LAB — SARS-COV-2 RNA RESP QL NAA+PROBE: NOT DETECTED

## 2020-09-16 ENCOUNTER — ANESTHESIA EVENT (OUTPATIENT)
Dept: PERIOP | Facility: HOSPITAL | Age: 75
End: 2020-09-16

## 2020-09-16 ENCOUNTER — APPOINTMENT (OUTPATIENT)
Dept: GENERAL RADIOLOGY | Facility: HOSPITAL | Age: 75
End: 2020-09-16

## 2020-09-16 ENCOUNTER — HOSPITAL ENCOUNTER (INPATIENT)
Facility: HOSPITAL | Age: 75
LOS: 1 days | Discharge: HOME HEALTH | End: 2020-09-17
Attending: ORTHOPAEDIC SURGERY | Admitting: ORTHOPAEDIC SURGERY

## 2020-09-16 ENCOUNTER — ANESTHESIA (OUTPATIENT)
Dept: PERIOP | Facility: HOSPITAL | Age: 75
End: 2020-09-16

## 2020-09-16 DIAGNOSIS — Z98.890 STATUS POST HIP SURGERY: Primary | ICD-10-CM

## 2020-09-16 DIAGNOSIS — M16.11 PRIMARY OSTEOARTHRITIS OF RIGHT HIP: ICD-10-CM

## 2020-09-16 LAB
ABO GROUP BLD: NORMAL
BLD GP AB SCN SERPL QL: NEGATIVE
RH BLD: NEGATIVE
T&S EXPIRATION DATE: NORMAL

## 2020-09-16 PROCEDURE — 25010000002 HYDROMORPHONE PER 4 MG: Performed by: ANESTHESIOLOGY

## 2020-09-16 PROCEDURE — 25010000002 DEXAMETHASONE PER 1 MG: Performed by: ANESTHESIOLOGY

## 2020-09-16 PROCEDURE — 25010000002 KETOROLAC TROMETHAMINE PER 15 MG: Performed by: ORTHOPAEDIC SURGERY

## 2020-09-16 PROCEDURE — 25010000003 CEFAZOLIN IN DEXTROSE 2-4 GM/100ML-% SOLUTION: Performed by: ORTHOPAEDIC SURGERY

## 2020-09-16 PROCEDURE — 86850 RBC ANTIBODY SCREEN: CPT | Performed by: ORTHOPAEDIC SURGERY

## 2020-09-16 PROCEDURE — 25010000002 MORPHINE (PF) 10 MG/ML SOLUTION 1 ML CARTRIDGE: Performed by: ORTHOPAEDIC SURGERY

## 2020-09-16 PROCEDURE — 25010000002 VANCOMYCIN PER 500 MG: Performed by: ORTHOPAEDIC SURGERY

## 2020-09-16 PROCEDURE — 25010000002 EPINEPHRINE 30 MG/30ML SOLUTION 30 ML VIAL: Performed by: ORTHOPAEDIC SURGERY

## 2020-09-16 PROCEDURE — C1776 JOINT DEVICE (IMPLANTABLE): HCPCS | Performed by: ORTHOPAEDIC SURGERY

## 2020-09-16 PROCEDURE — 86900 BLOOD TYPING SEROLOGIC ABO: CPT | Performed by: ORTHOPAEDIC SURGERY

## 2020-09-16 PROCEDURE — 97161 PT EVAL LOW COMPLEX 20 MIN: CPT

## 2020-09-16 PROCEDURE — 86901 BLOOD TYPING SEROLOGIC RH(D): CPT | Performed by: ORTHOPAEDIC SURGERY

## 2020-09-16 PROCEDURE — 25010000002 FENTANYL CITRATE (PF) 100 MCG/2ML SOLUTION: Performed by: ANESTHESIOLOGY

## 2020-09-16 PROCEDURE — 0SR906A REPLACEMENT OF RIGHT HIP JOINT WITH OXIDIZED ZIRCONIUM ON POLYETHYLENE SYNTHETIC SUBSTITUTE, UNCEMENTED, OPEN APPROACH: ICD-10-PCS | Performed by: ORTHOPAEDIC SURGERY

## 2020-09-16 PROCEDURE — 25010000002 NEOSTIGMINE PER 0.5 MG: Performed by: ANESTHESIOLOGY

## 2020-09-16 PROCEDURE — 25010000002 PROPOFOL 10 MG/ML EMULSION: Performed by: ANESTHESIOLOGY

## 2020-09-16 PROCEDURE — 97110 THERAPEUTIC EXERCISES: CPT

## 2020-09-16 PROCEDURE — 25010000002 ROPIVACAINE PER 1 MG: Performed by: ORTHOPAEDIC SURGERY

## 2020-09-16 PROCEDURE — 25010000002 ONDANSETRON PER 1 MG: Performed by: ANESTHESIOLOGY

## 2020-09-16 PROCEDURE — 73501 X-RAY EXAM HIP UNI 1 VIEW: CPT

## 2020-09-16 DEVICE — DEV CONTRL TISS STRATAFIXSPIRALMNCRYL PLSPS2 REV3/0 45CM: Type: IMPLANTABLE DEVICE | Site: HIP | Status: FUNCTIONAL

## 2020-09-16 DEVICE — OXINIUM FEMORAL HEAD 12/14 TAPER                                    36 MM -3
Type: IMPLANTABLE DEVICE | Site: HIP | Status: FUNCTIONAL
Brand: OXINIUM

## 2020-09-16 DEVICE — REFLECTION SPHERICAL HEAD SCREW 20MM
Type: IMPLANTABLE DEVICE | Site: HIP | Status: FUNCTIONAL
Brand: REFLECTION

## 2020-09-16 DEVICE — R3 3 HOLE ACETABULAR SHELL 58MM
Type: IMPLANTABLE DEVICE | Site: HIP | Status: FUNCTIONAL
Brand: R3 ACETABULAR

## 2020-09-16 DEVICE — REFLECTION SPHERICAL HEAD SCREW 30MM
Type: IMPLANTABLE DEVICE | Site: HIP | Status: FUNCTIONAL
Brand: REFLECTION

## 2020-09-16 DEVICE — POLARSTEM STANDARD NON-CEMENTED                                    WITH TI/HA 3
Type: IMPLANTABLE DEVICE | Site: HIP | Status: FUNCTIONAL
Brand: POLARSTEM

## 2020-09-16 DEVICE — IMPLANTABLE DEVICE: Type: IMPLANTABLE DEVICE | Site: HIP | Status: FUNCTIONAL

## 2020-09-16 DEVICE — DEV CONTRL TISS STRATAFIX SYMM PDS PLUS VIL CT-1 60CM: Type: IMPLANTABLE DEVICE | Site: HIP | Status: FUNCTIONAL

## 2020-09-16 DEVICE — R3 20 DEGREE XLPE ACETABULAR LINER                                    36MM INNER DIAMETER X OUTER DIAMETER 58MM
Type: IMPLANTABLE DEVICE | Site: HIP | Status: FUNCTIONAL
Brand: R3

## 2020-09-16 RX ORDER — FENTANYL CITRATE 50 UG/ML
50 INJECTION, SOLUTION INTRAMUSCULAR; INTRAVENOUS
Status: DISCONTINUED | OUTPATIENT
Start: 2020-09-16 | End: 2020-09-16 | Stop reason: HOSPADM

## 2020-09-16 RX ORDER — TRIAMTERENE AND HYDROCHLOROTHIAZIDE 75; 50 MG/1; MG/1
1 TABLET ORAL EVERY MORNING
Status: DISCONTINUED | OUTPATIENT
Start: 2020-09-16 | End: 2020-09-16

## 2020-09-16 RX ORDER — VANCOMYCIN HYDROCHLORIDE 1 G/200ML
15 INJECTION, SOLUTION INTRAVENOUS ONCE
Status: COMPLETED | OUTPATIENT
Start: 2020-09-16 | End: 2020-09-16

## 2020-09-16 RX ORDER — ONDANSETRON 2 MG/ML
INJECTION INTRAMUSCULAR; INTRAVENOUS AS NEEDED
Status: DISCONTINUED | OUTPATIENT
Start: 2020-09-16 | End: 2020-09-16 | Stop reason: SURG

## 2020-09-16 RX ORDER — TRIAMTERENE AND HYDROCHLOROTHIAZIDE 37.5; 25 MG/1; MG/1
2 TABLET ORAL DAILY
Status: DISCONTINUED | OUTPATIENT
Start: 2020-09-16 | End: 2020-09-17 | Stop reason: HOSPADM

## 2020-09-16 RX ORDER — AMLODIPINE BESYLATE 10 MG/1
10 TABLET ORAL EVERY MORNING
Status: DISCONTINUED | OUTPATIENT
Start: 2020-09-16 | End: 2020-09-17 | Stop reason: HOSPADM

## 2020-09-16 RX ORDER — PROPOFOL 10 MG/ML
VIAL (ML) INTRAVENOUS AS NEEDED
Status: DISCONTINUED | OUTPATIENT
Start: 2020-09-16 | End: 2020-09-16 | Stop reason: SURG

## 2020-09-16 RX ORDER — POLYETHYLENE GLYCOL 3350 17 G/17G
17 POWDER, FOR SOLUTION ORAL 2 TIMES DAILY
Status: DISCONTINUED | OUTPATIENT
Start: 2020-09-16 | End: 2020-09-17 | Stop reason: HOSPADM

## 2020-09-16 RX ORDER — MAGNESIUM HYDROXIDE 1200 MG/15ML
LIQUID ORAL AS NEEDED
Status: DISCONTINUED | OUTPATIENT
Start: 2020-09-16 | End: 2020-09-16 | Stop reason: HOSPADM

## 2020-09-16 RX ORDER — TRANEXAMIC ACID 100 MG/ML
INJECTION, SOLUTION INTRAVENOUS AS NEEDED
Status: DISCONTINUED | OUTPATIENT
Start: 2020-09-16 | End: 2020-09-16 | Stop reason: SURG

## 2020-09-16 RX ORDER — ONDANSETRON 4 MG/1
4 TABLET, FILM COATED ORAL EVERY 6 HOURS PRN
Status: DISCONTINUED | OUTPATIENT
Start: 2020-09-16 | End: 2020-09-17 | Stop reason: HOSPADM

## 2020-09-16 RX ORDER — WOUND DRESSING ADHESIVE - LIQUID
LIQUID MISCELLANEOUS AS NEEDED
Status: DISCONTINUED | OUTPATIENT
Start: 2020-09-16 | End: 2020-09-16 | Stop reason: HOSPADM

## 2020-09-16 RX ORDER — ROCURONIUM BROMIDE 10 MG/ML
INJECTION, SOLUTION INTRAVENOUS AS NEEDED
Status: DISCONTINUED | OUTPATIENT
Start: 2020-09-16 | End: 2020-09-16 | Stop reason: SURG

## 2020-09-16 RX ORDER — SODIUM CHLORIDE 0.9 % (FLUSH) 0.9 %
3 SYRINGE (ML) INJECTION EVERY 12 HOURS SCHEDULED
Status: DISCONTINUED | OUTPATIENT
Start: 2020-09-16 | End: 2020-09-16 | Stop reason: HOSPADM

## 2020-09-16 RX ORDER — HYDROCODONE BITARTRATE AND ACETAMINOPHEN 7.5; 325 MG/1; MG/1
1 TABLET ORAL EVERY 4 HOURS PRN
Status: DISCONTINUED | OUTPATIENT
Start: 2020-09-16 | End: 2020-09-17 | Stop reason: HOSPADM

## 2020-09-16 RX ORDER — DEXAMETHASONE SODIUM PHOSPHATE 10 MG/ML
INJECTION INTRAMUSCULAR; INTRAVENOUS AS NEEDED
Status: DISCONTINUED | OUTPATIENT
Start: 2020-09-16 | End: 2020-09-16 | Stop reason: SURG

## 2020-09-16 RX ORDER — DIPHENHYDRAMINE HYDROCHLORIDE 50 MG/ML
12.5 INJECTION INTRAMUSCULAR; INTRAVENOUS
Status: DISCONTINUED | OUTPATIENT
Start: 2020-09-16 | End: 2020-09-16 | Stop reason: HOSPADM

## 2020-09-16 RX ORDER — PREGABALIN 75 MG/1
150 CAPSULE ORAL ONCE
Status: COMPLETED | OUTPATIENT
Start: 2020-09-16 | End: 2020-09-16

## 2020-09-16 RX ORDER — ASPIRIN 81 MG/1
81 TABLET ORAL EVERY 12 HOURS SCHEDULED
Status: DISCONTINUED | OUTPATIENT
Start: 2020-09-17 | End: 2020-09-17 | Stop reason: HOSPADM

## 2020-09-16 RX ORDER — KETAMINE HYDROCHLORIDE 10 MG/ML
INJECTION INTRAMUSCULAR; INTRAVENOUS AS NEEDED
Status: DISCONTINUED | OUTPATIENT
Start: 2020-09-16 | End: 2020-09-16 | Stop reason: SURG

## 2020-09-16 RX ORDER — SODIUM CHLORIDE, SODIUM LACTATE, POTASSIUM CHLORIDE, CALCIUM CHLORIDE 600; 310; 30; 20 MG/100ML; MG/100ML; MG/100ML; MG/100ML
9 INJECTION, SOLUTION INTRAVENOUS CONTINUOUS
Status: DISCONTINUED | OUTPATIENT
Start: 2020-09-16 | End: 2020-09-16 | Stop reason: HOSPADM

## 2020-09-16 RX ORDER — NALOXONE HCL 0.4 MG/ML
0.2 VIAL (ML) INJECTION AS NEEDED
Status: DISCONTINUED | OUTPATIENT
Start: 2020-09-16 | End: 2020-09-16 | Stop reason: HOSPADM

## 2020-09-16 RX ORDER — PROMETHAZINE HYDROCHLORIDE 25 MG/1
25 SUPPOSITORY RECTAL ONCE AS NEEDED
Status: DISCONTINUED | OUTPATIENT
Start: 2020-09-16 | End: 2020-09-16 | Stop reason: HOSPADM

## 2020-09-16 RX ORDER — HYDROMORPHONE HYDROCHLORIDE 1 MG/ML
0.5 INJECTION, SOLUTION INTRAMUSCULAR; INTRAVENOUS; SUBCUTANEOUS
Status: DISCONTINUED | OUTPATIENT
Start: 2020-09-16 | End: 2020-09-16 | Stop reason: HOSPADM

## 2020-09-16 RX ORDER — HYDROCODONE BITARTRATE AND ACETAMINOPHEN 7.5; 325 MG/1; MG/1
2 TABLET ORAL EVERY 4 HOURS PRN
Status: DISCONTINUED | OUTPATIENT
Start: 2020-09-16 | End: 2020-09-17 | Stop reason: HOSPADM

## 2020-09-16 RX ORDER — MELOXICAM 15 MG/1
15 TABLET ORAL ONCE
Status: COMPLETED | OUTPATIENT
Start: 2020-09-16 | End: 2020-09-16

## 2020-09-16 RX ORDER — CEFAZOLIN SODIUM 2 G/100ML
2 INJECTION, SOLUTION INTRAVENOUS ONCE
Status: COMPLETED | OUTPATIENT
Start: 2020-09-16 | End: 2020-09-16

## 2020-09-16 RX ORDER — OXYCODONE AND ACETAMINOPHEN 7.5; 325 MG/1; MG/1
1 TABLET ORAL ONCE AS NEEDED
Status: DISCONTINUED | OUTPATIENT
Start: 2020-09-16 | End: 2020-09-16 | Stop reason: HOSPADM

## 2020-09-16 RX ORDER — LIDOCAINE HYDROCHLORIDE 10 MG/ML
0.5 INJECTION, SOLUTION EPIDURAL; INFILTRATION; INTRACAUDAL; PERINEURAL ONCE AS NEEDED
Status: DISCONTINUED | OUTPATIENT
Start: 2020-09-16 | End: 2020-09-16 | Stop reason: HOSPADM

## 2020-09-16 RX ORDER — ONDANSETRON 2 MG/ML
4 INJECTION INTRAMUSCULAR; INTRAVENOUS EVERY 6 HOURS PRN
Status: DISCONTINUED | OUTPATIENT
Start: 2020-09-16 | End: 2020-09-17 | Stop reason: HOSPADM

## 2020-09-16 RX ORDER — MELOXICAM 15 MG/1
15 TABLET ORAL DAILY
Status: DISCONTINUED | OUTPATIENT
Start: 2020-09-17 | End: 2020-09-17 | Stop reason: HOSPADM

## 2020-09-16 RX ORDER — KETOROLAC TROMETHAMINE 30 MG/ML
30 INJECTION, SOLUTION INTRAMUSCULAR; INTRAVENOUS EVERY 8 HOURS
Status: DISCONTINUED | OUTPATIENT
Start: 2020-09-16 | End: 2020-09-17 | Stop reason: HOSPADM

## 2020-09-16 RX ORDER — EPHEDRINE SULFATE 50 MG/ML
5 INJECTION, SOLUTION INTRAVENOUS ONCE AS NEEDED
Status: DISCONTINUED | OUTPATIENT
Start: 2020-09-16 | End: 2020-09-16 | Stop reason: HOSPADM

## 2020-09-16 RX ORDER — LABETALOL HYDROCHLORIDE 5 MG/ML
5 INJECTION, SOLUTION INTRAVENOUS
Status: DISCONTINUED | OUTPATIENT
Start: 2020-09-16 | End: 2020-09-16 | Stop reason: HOSPADM

## 2020-09-16 RX ORDER — SODIUM CHLORIDE 0.9 % (FLUSH) 0.9 %
3-10 SYRINGE (ML) INJECTION AS NEEDED
Status: DISCONTINUED | OUTPATIENT
Start: 2020-09-16 | End: 2020-09-16 | Stop reason: HOSPADM

## 2020-09-16 RX ORDER — FLUMAZENIL 0.1 MG/ML
0.2 INJECTION INTRAVENOUS AS NEEDED
Status: DISCONTINUED | OUTPATIENT
Start: 2020-09-16 | End: 2020-09-16 | Stop reason: HOSPADM

## 2020-09-16 RX ORDER — CEFAZOLIN SODIUM 2 G/100ML
2 INJECTION, SOLUTION INTRAVENOUS EVERY 8 HOURS
Status: COMPLETED | OUTPATIENT
Start: 2020-09-16 | End: 2020-09-16

## 2020-09-16 RX ORDER — HYDROCODONE BITARTRATE AND ACETAMINOPHEN 7.5; 325 MG/1; MG/1
1 TABLET ORAL ONCE AS NEEDED
Status: DISCONTINUED | OUTPATIENT
Start: 2020-09-16 | End: 2020-09-16 | Stop reason: HOSPADM

## 2020-09-16 RX ORDER — ONDANSETRON 2 MG/ML
4 INJECTION INTRAMUSCULAR; INTRAVENOUS ONCE AS NEEDED
Status: DISCONTINUED | OUTPATIENT
Start: 2020-09-16 | End: 2020-09-16 | Stop reason: HOSPADM

## 2020-09-16 RX ORDER — ACETAMINOPHEN 10 MG/ML
1000 INJECTION, SOLUTION INTRAVENOUS ONCE
Status: COMPLETED | OUTPATIENT
Start: 2020-09-16 | End: 2020-09-16

## 2020-09-16 RX ORDER — ACETAMINOPHEN 325 MG/1
325 TABLET ORAL EVERY 4 HOURS PRN
Status: DISCONTINUED | OUTPATIENT
Start: 2020-09-16 | End: 2020-09-17 | Stop reason: HOSPADM

## 2020-09-16 RX ORDER — DIPHENHYDRAMINE HCL 25 MG
25 CAPSULE ORAL
Status: DISCONTINUED | OUTPATIENT
Start: 2020-09-16 | End: 2020-09-16 | Stop reason: HOSPADM

## 2020-09-16 RX ORDER — FENTANYL CITRATE 50 UG/ML
INJECTION, SOLUTION INTRAMUSCULAR; INTRAVENOUS AS NEEDED
Status: DISCONTINUED | OUTPATIENT
Start: 2020-09-16 | End: 2020-09-16 | Stop reason: SURG

## 2020-09-16 RX ORDER — LIDOCAINE HYDROCHLORIDE 20 MG/ML
INJECTION, SOLUTION INFILTRATION; PERINEURAL AS NEEDED
Status: DISCONTINUED | OUTPATIENT
Start: 2020-09-16 | End: 2020-09-16 | Stop reason: SURG

## 2020-09-16 RX ORDER — PANTOPRAZOLE SODIUM 40 MG/1
40 TABLET, DELAYED RELEASE ORAL
Status: DISCONTINUED | OUTPATIENT
Start: 2020-09-17 | End: 2020-09-17 | Stop reason: HOSPADM

## 2020-09-16 RX ORDER — GLYCOPYRROLATE 0.2 MG/ML
INJECTION INTRAMUSCULAR; INTRAVENOUS AS NEEDED
Status: DISCONTINUED | OUTPATIENT
Start: 2020-09-16 | End: 2020-09-16 | Stop reason: SURG

## 2020-09-16 RX ORDER — HYDROMORPHONE HCL 110MG/55ML
PATIENT CONTROLLED ANALGESIA SYRINGE INTRAVENOUS AS NEEDED
Status: DISCONTINUED | OUTPATIENT
Start: 2020-09-16 | End: 2020-09-16 | Stop reason: SURG

## 2020-09-16 RX ORDER — PROMETHAZINE HYDROCHLORIDE 25 MG/1
25 TABLET ORAL ONCE AS NEEDED
Status: DISCONTINUED | OUTPATIENT
Start: 2020-09-16 | End: 2020-09-16 | Stop reason: HOSPADM

## 2020-09-16 RX ORDER — EPHEDRINE SULFATE 50 MG/ML
INJECTION, SOLUTION INTRAVENOUS AS NEEDED
Status: DISCONTINUED | OUTPATIENT
Start: 2020-09-16 | End: 2020-09-16 | Stop reason: SURG

## 2020-09-16 RX ADMIN — SODIUM CHLORIDE, POTASSIUM CHLORIDE, SODIUM LACTATE AND CALCIUM CHLORIDE 9 ML/HR: 600; 310; 30; 20 INJECTION, SOLUTION INTRAVENOUS at 06:55

## 2020-09-16 RX ADMIN — SODIUM CHLORIDE, POTASSIUM CHLORIDE, SODIUM LACTATE AND CALCIUM CHLORIDE: 600; 310; 30; 20 INJECTION, SOLUTION INTRAVENOUS at 08:51

## 2020-09-16 RX ADMIN — NEOSTIGMINE METHYLSULFATE 2 MG: 1 INJECTION INTRAMUSCULAR; INTRAVENOUS; SUBCUTANEOUS at 09:33

## 2020-09-16 RX ADMIN — HYDROMORPHONE HYDROCHLORIDE 0.5 MG: 2 INJECTION, SOLUTION INTRAMUSCULAR; INTRAVENOUS; SUBCUTANEOUS at 09:47

## 2020-09-16 RX ADMIN — SODIUM CHLORIDE, POTASSIUM CHLORIDE, SODIUM LACTATE AND CALCIUM CHLORIDE: 600; 310; 30; 20 INJECTION, SOLUTION INTRAVENOUS at 09:29

## 2020-09-16 RX ADMIN — KETAMINE HYDROCHLORIDE 10 MG: 10 INJECTION INTRAMUSCULAR; INTRAVENOUS at 08:07

## 2020-09-16 RX ADMIN — KETOROLAC TROMETHAMINE 30 MG: 30 INJECTION, SOLUTION INTRAMUSCULAR at 10:57

## 2020-09-16 RX ADMIN — VANCOMYCIN HYDROCHLORIDE 1000 MG: 1 INJECTION, SOLUTION INTRAVENOUS at 06:55

## 2020-09-16 RX ADMIN — PREGABALIN 150 MG: 75 CAPSULE ORAL at 06:48

## 2020-09-16 RX ADMIN — TRIAMTERENE AND HYDROCHLOROTHIAZIDE 2 TABLET: 37.5; 25 TABLET ORAL at 16:37

## 2020-09-16 RX ADMIN — KETOROLAC TROMETHAMINE 30 MG: 30 INJECTION, SOLUTION INTRAMUSCULAR at 18:12

## 2020-09-16 RX ADMIN — CEFAZOLIN SODIUM 2 G: 2 INJECTION, SOLUTION INTRAVENOUS at 08:15

## 2020-09-16 RX ADMIN — KETAMINE HYDROCHLORIDE 10 MG: 10 INJECTION INTRAMUSCULAR; INTRAVENOUS at 08:53

## 2020-09-16 RX ADMIN — KETAMINE HYDROCHLORIDE 10 MG: 10 INJECTION INTRAMUSCULAR; INTRAVENOUS at 08:31

## 2020-09-16 RX ADMIN — HYDROCODONE BITARTRATE AND ACETAMINOPHEN 1 TABLET: 7.5; 325 TABLET ORAL at 20:04

## 2020-09-16 RX ADMIN — TRANEXAMIC ACID 1000 MG: 100 INJECTION, SOLUTION INTRAVENOUS at 09:34

## 2020-09-16 RX ADMIN — PROPOFOL 150 MG: 10 INJECTION, EMULSION INTRAVENOUS at 08:07

## 2020-09-16 RX ADMIN — ROCURONIUM BROMIDE 50 MG: 10 INJECTION INTRAVENOUS at 08:07

## 2020-09-16 RX ADMIN — GLYCOPYRROLATE 0.3 MG: 0.2 INJECTION INTRAMUSCULAR; INTRAVENOUS at 09:33

## 2020-09-16 RX ADMIN — HYDROCODONE BITARTRATE AND ACETAMINOPHEN 1 TABLET: 7.5; 325 TABLET ORAL at 15:27

## 2020-09-16 RX ADMIN — FENTANYL CITRATE 50 MCG: 50 INJECTION, SOLUTION INTRAMUSCULAR; INTRAVENOUS at 10:35

## 2020-09-16 RX ADMIN — FENTANYL CITRATE 100 MCG: 50 INJECTION INTRAMUSCULAR; INTRAVENOUS at 08:52

## 2020-09-16 RX ADMIN — ACETAMINOPHEN 1000 MG: 10 INJECTION, SOLUTION INTRAVENOUS at 08:19

## 2020-09-16 RX ADMIN — LIDOCAINE HYDROCHLORIDE 100 MG: 20 INJECTION, SOLUTION INFILTRATION; PERINEURAL at 08:07

## 2020-09-16 RX ADMIN — EPHEDRINE SULFATE 5 MG: 50 INJECTION INTRAVENOUS at 09:00

## 2020-09-16 RX ADMIN — CEFAZOLIN SODIUM 2 G: 2 INJECTION, SOLUTION INTRAVENOUS at 22:42

## 2020-09-16 RX ADMIN — ONDANSETRON HYDROCHLORIDE 4 MG: 2 SOLUTION INTRAMUSCULAR; INTRAVENOUS at 09:28

## 2020-09-16 RX ADMIN — CEFAZOLIN SODIUM 2 G: 2 INJECTION, SOLUTION INTRAVENOUS at 14:24

## 2020-09-16 RX ADMIN — KETAMINE HYDROCHLORIDE 10 MG: 10 INJECTION INTRAMUSCULAR; INTRAVENOUS at 08:45

## 2020-09-16 RX ADMIN — AMLODIPINE BESYLATE 10 MG: 10 TABLET ORAL at 16:36

## 2020-09-16 RX ADMIN — KETAMINE HYDROCHLORIDE 10 MG: 10 INJECTION INTRAMUSCULAR; INTRAVENOUS at 08:38

## 2020-09-16 RX ADMIN — MUPIROCIN 1 APPLICATION: 20 OINTMENT TOPICAL at 20:04

## 2020-09-16 RX ADMIN — MELOXICAM 15 MG: 15 TABLET ORAL at 06:48

## 2020-09-16 RX ADMIN — POLYETHYLENE GLYCOL 3350 17 G: 17 POWDER, FOR SOLUTION ORAL at 20:03

## 2020-09-16 RX ADMIN — TRANEXAMIC ACID 1000 MG: 100 INJECTION, SOLUTION INTRAVENOUS at 08:39

## 2020-09-16 RX ADMIN — DEXAMETHASONE SODIUM PHOSPHATE 10 MG: 10 INJECTION INTRAMUSCULAR; INTRAVENOUS at 08:15

## 2020-09-16 NOTE — DISCHARGE PLACEMENT REQUEST
"Coleman Bear (75 y.o. Male)     Date of Birth Social Security Number Address Home Phone MRN    1945  [facility] [facility] [address] [phone] [id number]    Adventism Marital Status          Anabaptist        Admission Date Admission Type Admitting Provider Attending Provider Department, Room/Bed    9/16/20 Elective Kris Pal MD Brown, Reid B, MD [address], 90/1    Discharge Date Discharge Disposition Discharge Destination                       Attending Provider: Kris Pal MD    Allergies: No Known Allergies    Isolation: None   Infection: None   Code Status: CPR    Ht: 175.3 cm (69\")   Wt: 75.9 kg (167 lb 6.4 oz)    Admission Cmt: None   Principal Problem: Primary osteoarthritis of right hip [M16.11] More...                 Active Insurance as of 9/16/2020     Primary Coverage     Payor Plan Insurance Group Employer/Plan Group    MEDICARE MEDICARE A & B      Payor Plan Address Payor Plan Phone Number Payor Plan Fax Number Effective Dates    [address] [phone]  9/1/2008 - None Entered    [facility] [id number]       Subscriber Name Subscriber Birth Date Member ID       COLEMAN BEAR 1945 [id number]           Secondary Coverage     Payor Plan Insurance Group Employer/Plan Group    [facility] SUPP [id number]     Payor Plan Address Payor Plan Phone Number Payor Plan Fax Number Effective Dates    [address]   4/1/2009 - None Entered    [facility] [id number]       Subscriber Name Subscriber Birth Date Member ID       COLEMAN BEAR 1945 [id number]                 Emergency Contacts      (Rel.) Home Phone Work Phone Mobile Phone    Amaya Bear (Spouse) [phone] -- [phone]    Johnson,Duane (Son) -- -- [phone]    CecilsGénesis (Daughter) -- -- [phone]          "

## 2020-09-16 NOTE — ANESTHESIA PREPROCEDURE EVALUATION
Anesthesia Evaluation     Patient summary reviewed and Nursing notes reviewed   NPO Solid Status: > 8 hours  NPO Liquid Status: > 2 hours           Airway   Mallampati: II  TM distance: >3 FB  Neck ROM: full  Comment: ANTERIOR CERVICAL DISCECTOMY W/ FUSION    Cormack-Lehane Classification: grade I - full view of glottis  Dental - normal exam     Pulmonary - negative pulmonary ROS and normal exam    breath sounds clear to auscultation  Cardiovascular - normal exam    Rhythm: regular  Rate: normal    (+) hypertension 2 medications or greater, hyperlipidemia,   (-) angina, orthopnea, PND, [id number]      Neuro/Psych- negative ROS  GI/Hepatic/Renal/Endo - negative ROS     Musculoskeletal     (+) back pain, chronic pain,   Abdominal    Substance History - negative use     OB/GYN negative ob/gyn ROS         Other   arthritis,                      Anesthesia Plan    ASA 3     general     intravenous induction     Anesthetic plan, all risks, benefits, and alternatives have been provided, discussed and informed consent has been obtained with: patient.

## 2020-09-16 NOTE — ANESTHESIA POSTPROCEDURE EVALUATION
"Patient: Coleman Bear    Procedure Summary     Date: 09/16/20 Room / Location: [facility] OR [address] [place] [address] MAIN OR    Anesthesia Start: 0802 Anesthesia Stop: 1020    Procedure: TOTAL HIP ARTHROPLASTY (Right Hip) Diagnosis:       Primary osteoarthritis of right hip      (Primary osteoarthritis of right hip [M16.11])    Surgeon: Kris Pal MD Provider: Brian Dove MD    Anesthesia Type: general ASA Status: 3          Anesthesia Type: general    Vitals  Vitals Value Taken Time   /68 09/16/20 1045   Temp 36.4 °C (97.5 °F) 09/16/20 1013   Pulse 58 09/16/20 1047   Resp 16 09/16/20 1045   SpO2 98 % 09/16/20 1047   Vitals shown include unvalidated device data.        Post Anesthesia Care and Evaluation    Patient location during evaluation: bedside  Patient participation: complete - patient participated  Level of consciousness: awake and alert  Pain management: adequate  Airway patency: patent  Anesthetic complications: No anesthetic complications  PONV Status: none  Cardiovascular status: acceptable  Respiratory status: acceptable  Hydration status: acceptable    Comments: /68   Pulse 57   Temp 36.4 °C (97.5 °F) (Oral)   Resp 16   Ht 175.3 cm (69\")   Wt 75.9 kg (167 lb 6.4 oz)   SpO2 99%   BMI 24.72 kg/m²         "

## 2020-09-16 NOTE — PLAN OF CARE
Goal Outcome Evaluation:  Plan of Care Reviewed With: patient  Progress: improving  Outcome Summary: A&Ox4. Admit from PACU s/p RTHA. VSS. NVI. MAKI dressing CDI. Worked with PT, up in chair. Ambulating with walker and stand by assist. F/C in place r/t hx of post op urinary retention, to be d/c'd tomorrow morning. Pain controlled. Verbalized understanding of all education. Plans to D/C home with HH. Will cont to monitor.     Addendum- Dr. Pal rounded on patient, verbal order to remove F/C given. Patient is DTV by 2300.

## 2020-09-16 NOTE — THERAPY EVALUATION
Patient Name: Coleman Bear  : 1945    MRN: [id number]                              Today's Date: 2020       Admit Date: 2020    Visit Dx:     ICD-10-CM ICD-9-CM   1. Primary osteoarthritis of right hip  M16.11 715.15     Patient Active Problem List   Diagnosis   • HTN (hypertension)   • Lumbar spinal stenosis   • Benign prostatic hyperplasia with lower urinary tract symptoms   • Primary osteoarthritis of left hip   • Hip pain, bilateral   • Status post total replacement of left hip   • Primary osteoarthritis of right hip     Past Medical History:   Diagnosis Date   • Arthritis    • BPH (benign prostatic hyperplasia)    • Chronic pain disorder    • High cholesterol    • Hip pain, right    • HTN (hypertension)    • Low back pain    • Lumbar stenosis     ABOVE THE L3-5 FUSION   • Postoperative urinary retention     REQUIRES CATHETER REMAINING FOR DAYS POST OP PER UROLOGY   • Shoulder pain      Past Surgical History:   Procedure Laterality Date   • ANTERIOR CERVICAL DISCECTOMY W/ FUSION      LIMITED RANGE OF MOTION   • HERNIA REPAIR      ing   • LUMBAR DISCECTOMY  1992    AND FUSION   • LUMBAR DISCECTOMY FUSION INSTRUMENTATION N/A 2017    Procedure: L2-L3 repeat laminectomy and fusion with instrumentation, removal instrumentation L3 to L5;  Surgeon: Cameron Mcneil MD;  Location: [facility];  Service:    • LUMBAR EPIDURAL INJECTION     • LUMBAR LAMINECTOMY WITH FUSION  2006   • PROSTATE SURGERY     • SHOULDER ARTHROSCOPY W/ SUBACROMIAL DECOMPRESSION AND DISTAL CLAVICLE EXCISION Right 2011    AND EXTENSIVE ROTATOR CUFF DEBRIDEMENT   • TOTAL HIP ARTHROPLASTY Left 3/16/2018    Procedure: TOTAL HIP ARTHROPLASTY;  Surgeon: Kris Pal MD;  Location: [facility];  Service: Orthopedics     General Information     Row Name 20 1434          Physical Therapy Time and Intention    Document Type  evaluation  -AR     Mode of Treatment  physical therapy  -AR     Row Name  09/16/20 1434          General Information    Patient Profile Reviewed  yes  -AR     Prior Level of Function  independent: no AD, no falls  -AR     Existing Precautions/Restrictions  fall  -AR     Barriers to Rehab  none identified  -AR     Row Name 09/16/20 1434          Living Environment    Lives With  spouse  -AR     Row Name 09/16/20 1434          Home Main Entrance    Number of Stairs, Main Entrance  two  -AR     Stair Railings, Main Entrance  railings safe and in good condition  -AR     Row Name 09/16/20 1434          Stairs Within Home, Primary    Number of Stairs, Within Home, Primary  none  -AR     Row Name 09/16/20 1434          Cognition    Orientation Status (Cognition)  oriented x 3  -AR     Row Name 09/16/20 1434          Safety Issues, Functional Mobility    Impairments Affecting Function (Mobility)  balance;endurance/activity tolerance;pain;range of motion (ROM);strength  -AR       User Key  (r) = Recorded By, (t) = Taken By, (c) = Cosigned By    Initials Name Provider Type    AR Antonia Thompson PT Physical Therapist        Mobility     Row Name 09/16/20 1435          Bed Mobility    Bed Mobility  supine-sit  -AR     Supine-Sit [place] (Bed Mobility)  supervision  -AR     Assistive Device (Bed Mobility)  head of bed elevated  -AR     Row Name 09/16/20 1435          Sit-Stand Transfer    Sit-Stand [place] (Transfers)  contact guard  -AR     Assistive Device (Sit-Stand Transfers)  walker, front-wheeled  -AR     Row Name 09/16/20 1435          Gait/Stairs (Locomotion)    [place] Level (Gait)  contact guard  -AR     Assistive Device (Gait)  walker, front-wheeled  -AR     Distance in Feet (Gait)  35' cues for posture/looking straight  -AR     Deviations/Abnormal Patterns (Gait)  rosette decreased;gait speed decreased  -AR     Right Sided Gait Deviations  forward flexed posture;heel strike decreased;weight shift ability decreased  -AR     Row Name 09/16/20 1435          Mobility     Extremity Weight-bearing Status  right lower extremity  -AR     Right Lower Extremity (Weight-bearing Status)  weight-bearing as tolerated (WBAT)  -AR       User Key  (r) = Recorded By, (t) = Taken By, (c) = Cosigned By    Initials Name Provider Type    Antonia Islas, PT Physical Therapist        Obj/Interventions     Row Name 09/16/20 1436          Range of Motion Comprehensive    Comment, General Range of Motion  WFL except R hip  -AR     Row Name 09/16/20 1436          Strength Comprehensive (MMT)    Comment, General Manual Muscle Testing (MMT) Assessment  WFL excpept R LE  -AR     Row Name 09/16/20 1436          Motor Skills    Therapeutic Exercise  -- R DHRUV ex program 5x w/ ed for HEP  -AR     Row Name 09/16/20 1436          Balance    Balance Assessment  standing static balance;standing dynamic balance  -AR     Static Standing Balance  mild impairment  -AR     Dynamic Standing Balance  mild impairment  -AR     Comment, Balance  w/ RW  -AR       User Key  (r) = Recorded By, (t) = Taken By, (c) = Cosigned By    Initials Name Provider Type    Antonia Islas, PT Physical Therapist        Goals/Plan     Row Name 09/16/20 1438          Bed Mobility Goal 1 (PT)    Activity/Assistive Device (Bed Mobility Goal 1, PT)  bed mobility activities, all  -AR     [place] Level/Cues Needed (Bed Mobility Goal 1, PT)  modified independence  -AR     Time Frame (Bed Mobility Goal 1, PT)  1 week  -AR     Row Name 09/16/20 1438          Transfer Goal 1 (PT)    Activity/Assistive Device (Transfer Goal 1, PT)  sit-to-stand/stand-to-sit;walker, rolling  -AR     [place] Level/Cues Needed (Transfer Goal 1, PT)  supervision required  -AR     Time Frame (Transfer Goal 1, PT)  1 week  -AR     Row Name 09/16/20 1438          Gait Training Goal 1 (PT)    Activity/Assistive Device (Gait Training Goal 1, PT)  gait (walking locomotion);walker, rolling  -AR     [place] Level (Gait Training Goal 1, PT)  contact guard  assist  -AR     Distance (Gait Training Goal 1, PT)  100  -AR     Time Frame (Gait Training Goal 1, PT)  1 week  -AR     Row Name 09/16/20 1438          Stairs Goal 1 (PT)    Activity/Assistive Device (Stairs Goal 1, PT)  stairs, all skills  -AR     [place] Level/Cues Needed (Stairs Goal 1, PT)  contact guard assist  -AR     Number of Stairs (Stairs Goal 1, PT)  2 steps  -AR     Time Frame (Stairs Goal 1, PT)  1 week  -AR       User Key  (r) = Recorded By, (t) = Taken By, (c) = Cosigned By    Initials Name Provider Type    AR Antonia Thompson, PT Physical Therapist        Clinical Impression     Row Name 09/16/20 1437          Pain    Additional Documentation  Pain Scale: Numbers Pre/Post-Treatment (Group)  -AR     Row Name 09/16/20 1437          Pain Scale: Numbers Pre/Post-Treatment    Pretreatment Pain Rating  3/10  -AR     Posttreatment Pain Rating  4/10  -AR     Pain Location - Side  Right  -AR     Pain Location  hip  -AR     Pain Intervention(s)  Medication (See MAR);Repositioned;Cold applied  -AR     Row Name 09/16/20 1437          Plan of Care Review    Plan of Care Reviewed With  patient  -AR     Outcome Summary  POD 0 R DHRUV.  Pt reports independence prior to admission, no use of AD or h/o falls.  Today pt demonstrates impaired R hip ROM, strength and gait pattern from baseline but able to ambulate 35' w/ contact assist using RW, cues for improving gait pattern.  Performed ex w/ ed for HEP.  Antiicpate progress for DC to home with home PT, denies need for RW/BSC.  -AR     Row Name 09/16/20 1437          Therapy Assessment/Plan (PT)    Rehab Potential (PT)  good, to achieve stated therapy goals  -AR     Criteria for Skilled Interventions Met (PT)  yes  -AR     Row Name 09/16/20 1437          Vital Signs    O2 Delivery Pre Treatment  room air  -AR     Row Name 09/16/20 1437          Positioning and Restraints    Pre-Treatment Position  in bed  -AR     Post Treatment Position  chair  -AR     In Chair   notified nsg;reclined;sitting;call light within reach;encouraged to call for assist;exit alarm on;with family/caregiver  -AR       User Key  (r) = Recorded By, (t) = Taken By, (c) = Cosigned By    Initials Name Provider Type    Antonia Islas, JP Physical Therapist        Outcome Measures     Row Name 09/16/20 1439          How much help from another person do you currently need...    Turning from your back to your side while in flat bed without using bedrails?  4  -AR     Moving from lying on back to sitting on the side of a flat bed without bedrails?  3  -AR     Moving to and from a bed to a chair (including a wheelchair)?  3  -AR     Standing up from a chair using your arms (e.g., wheelchair, bedside chair)?  3  -AR     Climbing 3-5 steps with a railing?  2  -AR     To walk in hospital room?  3  -AR     AM-PAC 6 Clicks Score (PT)  18  -AR     Row Name 09/16/20 1439          Functional Assessment    Outcome Measure Options  AM-PAC 6 Clicks Basic Mobility (PT)  -AR       User Key  (r) = Recorded By, (t) = Taken By, (c) = Cosigned By    Initials Name Provider Type    Antonia Islas PT Physical Therapist        Physical Therapy Education                 Title: PT OT SLP Therapies (In Progress)     Topic: Physical Therapy (In Progress)     Point: Mobility training (In Progress)     Learning Progress Summary           Patient Acceptance, E, NR by AR at 9/16/2020 1440                   Point: Home exercise program (In Progress)     Learning Progress Summary           Patient Acceptance, E, NR by AR at 9/16/2020 1440                   Point: Body mechanics (In Progress)     Learning Progress Summary           Patient Acceptance, E, NR by AR at 9/16/2020 1440                   Point: Precautions (In Progress)     Learning Progress Summary           Patient Acceptance, E, NR by AR at 9/16/2020 1440                               User Key     Initials Effective Dates Name Provider Type Marlena DESAI 04/03/18 -   Antonia Thompson, PT Physical Therapist PT              PT Recommendation and Plan  Planned Therapy Interventions (PT): balance training, bed mobility training, gait training, home exercise program, patient/family education, transfer training, ROM (range of motion), stair training, strengthening  Plan of Care Reviewed With: patient  Outcome Summary: POD 0 R DHRUV.  Pt reports independence prior to admission, no use of AD or h/o falls.  Today pt demonstrates impaired R hip ROM, strength and gait pattern from baseline but able to ambulate 35' w/ contact assist using RW, cues for improving gait pattern.  Performed ex w/ ed for HEP.  Antiicpate progress for DC to home with home PT, denies need for RW/BSC.     Time Calculation:   PT Charges     Row Name 09/16/20 1433             Time Calculation    Start Time  1402  -AR      Stop Time  1434  -AR      Time Calculation (min)  32 min  -AR      PT Received On  09/16/20  -AR      PT - Next Appointment  09/17/20  -AR      PT Goal Re-Cert Due Date  09/23/20  -AR        User Key  (r) = Recorded By, (t) = Taken By, (c) = Cosigned By    Initials Name Provider Type    AR Antonia Thompson PT Physical Therapist        Therapy Charges for Today     Code Description Service Date Service Provider Modifiers Qty    67205497947 HC PT EVAL LOW COMPLEXITY 2 9/16/2020 Antonia Thompson, PT GP 1    32248053355 HC PT THER PROC EA 15 MIN 9/16/2020 Antonia Thompson PT GP 1          PT G-Codes  Outcome Measure Options: AM-PAC 6 Clicks Basic Mobility (PT)  AM-PAC 6 Clicks Score (PT): 18    Antonia Thompson PT  9/16/2020

## 2020-09-16 NOTE — ANESTHESIA PROCEDURE NOTES
Airway  Urgency: elective    Date/Time: 9/16/2020 8:10 AM  Airway not difficult    General Information and Staff    Patient location during procedure: OR  Anesthesiologist: Brian Dove MD    Indications and Patient Condition  Indications for airway management: airway protection    Preoxygenated: yes  Mask difficulty assessment: 2 - vent by mask + OA or adjuvant +/- NMBA    Final Airway Details  Final airway type: endotracheal airway      Successful airway: ETT  Cuffed: yes   Successful intubation technique: direct laryngoscopy  Endotracheal tube insertion site: oral  Blade: Michael  Blade size: 4  ETT size (mm): 8.0  Cormack-Lehane Classification: grade I - full view of glottis  Placement verified by: chest auscultation and capnometry   Measured from: teeth  ETT/EBT  to teeth (cm): 23  Number of attempts at approach: 1  Assessment: lips, teeth, and gum same as pre-op and atraumatic intubation

## 2020-09-16 NOTE — PROGRESS NOTES
Discharge Planning Assessment  [facility]     Patient Name: Coleman Bear  MRN: [id number]  Today's Date: 9/16/2020    Admit Date: 9/16/2020    Discharge Needs Assessment     Row Name 09/16/20 0200       Living Environment    Lives With  spouse    Current Living Arrangements  home/apartment/condo    Primary Care Provided by  self    Provides Primary Care For  no one    Family Caregiver if Needed  spouse    Quality of Family Relationships  involved;helpful       Resource/Environmental Concerns    Resource/Environmental Concerns  home accessibility    Home Accessibility Concerns  stairs to enter home       Transition Planning    Patient/Family Anticipates Transition to  home with family    Patient/Family Anticipated Services at Transition  home health care       Discharge Needs Assessment    Readmission Within the Last 30 Days  no previous admission in last 30 days    Current Outpatient/Agency/Support Group  homecare agency    Equipment Currently Used at Home  none    Concerns to be Addressed  basic needs;discharge planning    Outpatient/Agency/Support Group Needs  homecare agency    Discharge Facility/Level of Care Needs  home with home health    Provided Post Acute Provider List?  Yes    Post Acute Provider List  Home Health    Delivered To  Support Person    Method of Delivery  Telephone    Discharge Coordination/Progress  home with [facility]        Discharge Plan     Row Name 09/16/20 2524       Plan    Plan  home with [facility]    Patient/Family in Agreement with Plan  yes    Plan Comments  Checked CH[facility]ES. Spoke with pt and spouse. Confirmed facesheet correct. Explained role of CCP. Pt lives with spouse. Pt is mostly IADLs. He has used HH in past but not sure what agency. No SNF hx. Verified PCP and pharmacy. Pt plans home with HH, reviewed list with pt’s spouse she chose [facility], referral to Mart[facility].  CCP to follow….PEEWEE Nash        Continued Care and Services - Admitted Since 9/16/2020     Home  Medical Care     Service Provider Request Status Selected Services Address Phone Fax    [facility], [place]  Accepted N/A [facility] [phone]                Demographic Summary     Row Name 09/16/20 1348       General Information    Admission Type  inpatient    Arrived From  home    Required Notices Provided  Important Message from Medicare        Functional Status    No documentation.       Psychosocial    No documentation.       Abuse/Neglect    No documentation.       Legal    No documentation.       Substance Abuse    No documentation.       Patient Forms    No documentation.           VIRGINIA Randall

## 2020-09-16 NOTE — PLAN OF CARE
Problem: Adult Inpatient Plan of Care  Goal: Plan of Care Review  Recent Flowsheet Documentation  Taken 9/16/2020 1437 by Antonia Thompson PT  Plan of Care Reviewed With: patient  Outcome Summary: POD 0 R DHRUV.  Pt reports independence prior to admission, no use of AD or h/o falls.  Today pt demonstrates impaired R hip ROM, strength and gait pattern from baseline but able to ambulate 35' w/ contact assist using RW, cues for improving gait pattern.  Performed ex w/ ed for HEP.  Antiicpate progress for DC to home with home PT, denies need for RW/BSC.    ..Patient was intermittently wearing a face mask during this therapy encounter. Therapist used appropriate personal protective equipment including eye protection, mask, and gloves.  Mask used was standard procedure mask. Appropriate PPE was worn during the entire therapy session. Hand hygiene was completed before and after therapy session. Patient is not in enhanced droplet precautions.

## 2020-09-17 VITALS
RESPIRATION RATE: 17 BRPM | WEIGHT: 167.4 LBS | DIASTOLIC BLOOD PRESSURE: 62 MMHG | TEMPERATURE: 97.1 F | HEIGHT: 69 IN | OXYGEN SATURATION: 97 % | SYSTOLIC BLOOD PRESSURE: 106 MMHG | BODY MASS INDEX: 24.79 KG/M2 | HEART RATE: 62 BPM

## 2020-09-17 LAB
HCT VFR BLD AUTO: 36 % (ref 37.5–51)
HGB BLD-MCNC: 12.7 G/DL (ref 13–17.7)

## 2020-09-17 PROCEDURE — 97110 THERAPEUTIC EXERCISES: CPT

## 2020-09-17 PROCEDURE — 99024 POSTOP FOLLOW-UP VISIT: CPT | Performed by: NURSE PRACTITIONER

## 2020-09-17 PROCEDURE — 27130 TOTAL HIP ARTHROPLASTY: CPT | Performed by: ORTHOPAEDIC SURGERY

## 2020-09-17 PROCEDURE — 85014 HEMATOCRIT: CPT | Performed by: ORTHOPAEDIC SURGERY

## 2020-09-17 PROCEDURE — 85018 HEMOGLOBIN: CPT | Performed by: ORTHOPAEDIC SURGERY

## 2020-09-17 PROCEDURE — 25010000002 KETOROLAC TROMETHAMINE PER 15 MG: Performed by: ORTHOPAEDIC SURGERY

## 2020-09-17 RX ORDER — ONDANSETRON 4 MG/1
4 TABLET, FILM COATED ORAL EVERY 6 HOURS PRN
Qty: 10 TABLET | Refills: 0 | Status: SHIPPED | OUTPATIENT
Start: 2020-09-17

## 2020-09-17 RX ORDER — PANTOPRAZOLE SODIUM 40 MG/1
40 TABLET, DELAYED RELEASE ORAL DAILY
Qty: 14 TABLET | Refills: 0 | Status: SHIPPED | OUTPATIENT
Start: 2020-09-17

## 2020-09-17 RX ORDER — TRAMADOL HYDROCHLORIDE 50 MG/1
50 TABLET ORAL EVERY 6 HOURS PRN
Status: DISCONTINUED | OUTPATIENT
Start: 2020-09-17 | End: 2020-09-17 | Stop reason: HOSPADM

## 2020-09-17 RX ORDER — ASPIRIN 81 MG/1
81 TABLET ORAL EVERY 12 HOURS SCHEDULED
Qty: 60 TABLET | Refills: 0 | Status: SHIPPED | OUTPATIENT
Start: 2020-09-17 | End: 2020-10-17

## 2020-09-17 RX ORDER — TRAMADOL HYDROCHLORIDE 50 MG/1
50 TABLET ORAL EVERY 6 HOURS PRN
Qty: 60 TABLET | Refills: 0 | Status: SHIPPED | OUTPATIENT
Start: 2020-09-17 | End: 2020-10-02

## 2020-09-17 RX ORDER — POLYETHYLENE GLYCOL 3350 17 G/17G
17 POWDER, FOR SOLUTION ORAL 2 TIMES DAILY
Qty: 238 G | Refills: 0 | Status: SHIPPED | OUTPATIENT
Start: 2020-09-17 | End: 2020-10-01

## 2020-09-17 RX ADMIN — MELOXICAM 15 MG: 15 TABLET ORAL at 08:13

## 2020-09-17 RX ADMIN — AMLODIPINE BESYLATE 10 MG: 10 TABLET ORAL at 08:13

## 2020-09-17 RX ADMIN — ASPIRIN 81 MG: 81 TABLET, COATED ORAL at 08:13

## 2020-09-17 RX ADMIN — HYDROCODONE BITARTRATE AND ACETAMINOPHEN 2 TABLET: 7.5; 325 TABLET ORAL at 00:05

## 2020-09-17 RX ADMIN — KETOROLAC TROMETHAMINE 30 MG: 30 INJECTION, SOLUTION INTRAMUSCULAR at 10:03

## 2020-09-17 RX ADMIN — PANTOPRAZOLE SODIUM 40 MG: 40 TABLET, DELAYED RELEASE ORAL at 05:36

## 2020-09-17 RX ADMIN — MUPIROCIN 1 APPLICATION: 20 OINTMENT TOPICAL at 08:13

## 2020-09-17 RX ADMIN — TRIAMTERENE AND HYDROCHLOROTHIAZIDE 2 TABLET: 37.5; 25 TABLET ORAL at 08:13

## 2020-09-17 RX ADMIN — TRAMADOL HYDROCHLORIDE 50 MG: 50 TABLET, FILM COATED ORAL at 08:14

## 2020-09-17 RX ADMIN — POLYETHYLENE GLYCOL 3350 17 G: 17 POWDER, FOR SOLUTION ORAL at 08:14

## 2020-09-17 RX ADMIN — HYDROCODONE BITARTRATE AND ACETAMINOPHEN 1 TABLET: 7.5; 325 TABLET ORAL at 04:09

## 2020-09-17 NOTE — PLAN OF CARE
Goal Outcome Evaluation:  Plan of Care Reviewed With: patient  Progress: improving  Outcome Summary: patient stable. Minimal c/o pain. Voiding function intact. Meds to Bed. Education provided. F/U with Dr. Pal. D/C home with [facility]

## 2020-09-17 NOTE — DISCHARGE SUMMARY
Patient Name: Coleman Bear  Patient YOB: 1945    Date of Admission:  9/16/2020  Date of Discharge:  9/17/2020  Discharge Diagnosis: TOTAL HIP ARTHROPLASTY  Presenting Problem/History of Present Illness: Primary osteoarthritis of right hip [M16.11]  Status post total replacement of left hip [Z96.642]  Admitting Physician: Dr Kris Pal  Consults:   Consults     No orders found for last 30 day(s).          DETAILS OF HOSPITAL STAY:  Patient is a 75 y.o. male was admitted to the floor following the above procedure and underwent an uncomplicated hospital stay.  Patient did well with physical therapy and was ambulating well without problems.  On the day of discharge the wound was clean, dry and intact and calf was soft and non tender and Homans sign was negative.  Patient was tolerating  without problems.  Patient will be discharged home.    Condition on Discharge:  Stable    Vital Signs  Temp:  [97 °F (36.1 °C)-97.8 °F (36.6 °C)] 97.1 °F (36.2 °C)  Heart Rate:  [51-72] 59  Resp:  [12-17] 17  BP: (106-136)/(62-83) 106/62    LABS:   Admission on 09/16/2020   Component Date Value Ref Range Status   • ABO Type 09/16/2020 O   Final   • RH type 09/16/2020 Negative   Final   • Antibody Screen 09/16/2020 Negative   Final   • T&S Expiration Date 09/16/2020 9/19/2020 11:59:59 PM   Final   • Hemoglobin 09/17/2020 12.7* 13.0 - 17.7 g/dL Final   • Hematocrit 09/17/2020 36.0* 37.5 - 51.0 % Final       No results found.        Discharge Medications     Discharge Medications      New Medications      Instructions Start Date   aspirin 81 MG EC tablet   81 mg, Oral, Every 12 Hours Scheduled      ondansetron 4 MG tablet  Commonly known as: ZOFRAN   4 mg, Oral, Every 6 Hours PRN      pantoprazole 40 MG EC tablet  Commonly known as: PROTONIX   40 mg, Oral, Daily      polyethylene glycol 17 g packet  Commonly known as: MIRALAX   17 g, Oral, 2 Times Daily      traMADol 50 MG tablet  Commonly known as: ULTRAM   50 mg, Oral,  Every 6 Hours PRN         Continue These Medications      Instructions Start Date   acetaminophen 500 MG tablet  Commonly known as: TYLENOL   1,000 mg, Oral, Every 6 Hours PRN      amLODIPine 10 MG tablet  Commonly known as: NORVASC   10 mg, Oral, Every Morning      atorvastatin 20 MG tablet  Commonly known as: LIPITOR   20 mg, Oral, Every Morning      triamterene-hydrochlorothiazide 75-50 MG per tablet  Commonly known as: MAXZIDE   1 capsule, Oral, Every Morning         Stop These Medications    Bactroban Nasal 2 % nasal ointment  Generic drug: mupirocin     Chlorhexidine Gluconate Cloth 2 % pads     IBUPROFEN PO            Activity at Discharge:     Discharge Instructions:   1)  Patient is to continue with physical therapy exercises daily and continue working with the physical therapist as ordered.  2)  Follow Anterior hip precautions.   3)  Patient may weight bear as tolerated.   4)  Apply ice regularly. You may ice for long periods of time as long as ice is not directly on the skin. Patient instructed on frequent calf pumping exercises.  Patient also instructed on incentive spirometer during hospitalization and encouraged to continue to use at home regularly.   5)  The dressing should be left in place. If waterproof dressing is intact the patient may shower immediately following discharge. If the dressing becomes disloged or saturated it should be changed. Please refer to the [facility] information sheet if you have any questions about the dressing.  If you have a home health nurse or therapist they can be contacted to assist with dressing change or repair. You may also call the [facility] dressing hotline for questions related to the dressing ([phone]). If there still other problems or questions related to the dressing despite these measures then you can contact Julieta at our office [phone].   6)  Follow up appointment in 2 weeks - patient to call the office at [phone] to schedule. 7)  Patient will be discharged  on aspirin 81mg BID x weeks, then daily x 4weeks    Complete Discharge Diagnosis:    Patient Active Problem List   Diagnosis   • HTN (hypertension)   • Lumbar spinal stenosis   • Benign prostatic hyperplasia with lower urinary tract symptoms   • Primary osteoarthritis of left hip   • Hip pain, bilateral   • Status post total replacement of left hip   • Primary osteoarthritis of right hip           Follow-up Appointments  Future Appointments   Date Time Provider Department Center   9/29/2020 11:20 AM Clifford Pal MD [facility]     Additional Instructions for the Follow-ups that You Need to Schedule     Ambulatory Referral to Home Health   As directed      Face to Face Visit Date: 9/17/2020    Follow-up provider for Plan of Care?: I will be treating the patient on an ongoing basis.  Please send me the Plan of Care for signature.    Follow-up provider: CLIFFORD PAL [[id number]]    Reason/Clinical Findings: gloria    Describe mobility limitations that make leaving home difficult: postop    Nursing/Therapeutic Services Requested: Physical Therapy    PT orders: Total joint pathway    Frequency: 1 Week 1                    ESDRAS Prince  09/17/20  07:54 EDT

## 2020-09-17 NOTE — PLAN OF CARE
Goal Outcome Evaluation:  Plan of Care Reviewed With: patient  Progress: improving  Outcome Summary: vss, adriana dsg c/d/i, neurovascular status wnl, voiding well, reports adequate pain control, ambulating well, educated on monitoring b/p due to htn hx, discharge plan is home today if medically stable, will cont to monitor

## 2020-09-17 NOTE — PLAN OF CARE
Problem: Adult Inpatient Plan of Care  Goal: Plan of Care Review  Recent Flowsheet Documentation  Taken 9/17/2020 1025 by Lilian Jackson PTA  Progress: improving  Plan of Care Reviewed With: patient  Outcome Summary: Pt stating he was feeling better today when working w/ PT after changing pain meds prior to session. Pt amb 160' req SBA/SV and use of fww. Pt stable and attempting B step through. Pt asc/dsc 4 steps w/ educ on seq on stairs - mildly instability noted. Pt performed 2 sets of R hip protocol w/ AROM. Pt would benefit from HH PT after dc to improve his mobility, strength, and endurance.    Patient was wearing a face mask during this therapy encounter. Therapist used appropriate personal protective equipment including eye protection, mask, and gloves.  Mask used was standard procedure mask. Appropriate PPE was worn during the entire therapy session. Hand hygiene was completed before and after therapy session. Patient is not in enhanced droplet precautions.

## 2020-09-17 NOTE — THERAPY TREATMENT NOTE
Patient Name: Coleman Bear  : 1945    MRN: [id number]                              Today's Date: 2020       Admit Date: 2020    Visit Dx:     ICD-10-CM ICD-9-CM   1. Status post hip surgery  Z98.890 V45.89   2. Primary osteoarthritis of right hip  M16.11 715.15     Patient Active Problem List   Diagnosis   • HTN (hypertension)   • Lumbar spinal stenosis   • Benign prostatic hyperplasia with lower urinary tract symptoms   • Primary osteoarthritis of left hip   • Hip pain, bilateral   • Status post total replacement of left hip   • Primary osteoarthritis of right hip     Past Medical History:   Diagnosis Date   • Arthritis    • BPH (benign prostatic hyperplasia)    • Chronic pain disorder    • High cholesterol    • Hip pain, right    • HTN (hypertension)    • Low back pain    • Lumbar stenosis     ABOVE THE L3-5 FUSION   • Postoperative urinary retention     REQUIRES CATHETER REMAINING FOR DAYS POST OP PER UROLOGY   • Shoulder pain      Past Surgical History:   Procedure Laterality Date   • ANTERIOR CERVICAL DISCECTOMY W/ FUSION      LIMITED RANGE OF MOTION   • HERNIA REPAIR      ing   • LUMBAR DISCECTOMY  1992    AND FUSION   • LUMBAR DISCECTOMY FUSION INSTRUMENTATION N/A 2017    Procedure: L2-L3 repeat laminectomy and fusion with instrumentation, removal instrumentation L3 to L5;  Surgeon: Cameron Mcneil MD;  Location: [facility];  Service:    • LUMBAR EPIDURAL INJECTION     • LUMBAR LAMINECTOMY WITH FUSION  2006   • PROSTATE SURGERY     • SHOULDER ARTHROSCOPY W/ SUBACROMIAL DECOMPRESSION AND DISTAL CLAVICLE EXCISION Right 2011    AND EXTENSIVE ROTATOR CUFF DEBRIDEMENT   • TOTAL HIP ARTHROPLASTY Left 3/16/2018    Procedure: TOTAL HIP ARTHROPLASTY;  Surgeon: Kris Pal MD;  Location: [facility];  Service: Orthopedics     General Information     Row Name 20 1021          Physical Therapy Time and Intention    Document Type  therapy note (daily note)  -PH      Mode of Treatment  physical therapy  -PH     Row Name 09/17/20 1021          Safety Issues, Functional Mobility    Impairments Affecting Function (Mobility)  pain;endurance/activity tolerance;balance;range of motion (ROM)  -PH       User Key  (r) = Recorded By, (t) = Taken By, (c) = Cosigned By    Initials Name Provider Type    PH Ovidiosharon LilianBERNADINE arreguin Physical Therapy Assistant        Mobility     Row Name 09/17/20 1021          Bed Mobility    Bed Mobility  supine-sit  -PH     Supine-Sit [place] (Bed Mobility)  supervision;verbal cues  -PH     Comment (Bed Mobility)  bed flat to emulate home env; R side exit  -PH     Row Name 09/17/20 1021          Sit-Stand Transfer    Sit-Stand [place] (Transfers)  verbal cues;nonverbal cues (demo/gesture);minimum assist (75% patient effort);contact guard  -PH     Assistive Device (Sit-Stand Transfers)  walker, front-wheeled  -PH     Row Name 09/17/20 1021          Gait/Stairs (Locomotion)    [place] Level (Gait)  verbal cues;supervision;standby assist  -PH     Assistive Device (Gait)  walker, front-wheeled  -PH     Distance in Feet (Gait)  180'  -PH     Deviations/Abnormal Patterns (Gait)  rosette decreased;gait speed decreased;stride length decreased  -PH     Bilateral Gait Deviations  forward flexed posture  -PH     Right Sided Gait Deviations  heel strike decreased;weight shift ability decreased  -PH     [place] Level (Stairs)  contact guard;verbal cues;nonverbal cues (demo/gesture)  -PH     Handrail Location (Stairs)  both sides  -PH     Number of Steps (Stairs)  4 asc/dsc  -PH     Ascending Technique (Stairs)  step-to-step  -PH     Descending Technique (Stairs)  step-to-step;step-over-step  -PH     Comment (Gait/Stairs)  pt attempting step through w/ shortened step length.  -PH     Row Name 09/17/20 1021          Mobility    Extremity Weight-bearing Status  other (see comments) R hip  -PH     Right Lower Extremity (Weight-bearing Status)   weight-bearing as tolerated (WBAT)  -PH       User Key  (r) = Recorded By, (t) = Taken By, (c) = Cosigned By    Initials Name Provider Type     Lilian Jackson PTA Physical Therapy Assistant        Obj/Interventions     Row Name 09/17/20 1024          Motor Skills    Therapeutic Exercise  hip R hip protocol x 20, x 10  -PH     Row Name 09/17/20 1024          Hip (Therapeutic Exercise)    Hip (Therapeutic Exercise)  AROM (active range of motion)  -PH       User Key  (r) = Recorded By, (t) = Taken By, (c) = Cosigned By    Initials Name Provider Type     Lilian Jackson PTA Physical Therapy Assistant        Goals/Plan    No documentation.       Clinical Impression     Row Name 09/17/20 1025          Pain Scale: Numbers Pre/Post-Treatment    Pretreatment Pain Rating  4/10  -PH     Posttreatment Pain Rating  4/10  -PH     Pain Location - Side  Right  -PH     Pain Location - Orientation  incisional  -PH     Pain Location  hip  -PH     Row Name 09/17/20 1025          Plan of Care Review    Plan of Care Reviewed With  patient  -PH     Progress  improving  -PH     Outcome Summary  Pt stating he was feeling better today when working w/ PT after changing pain meds prior to session. Pt amb 160' req SBA/SV and use of fww. Pt stable and attempting B step through. Pt asc/dsc 4 steps w/ educ on seq on stairs - mildly instability noted. Pt performed 2 sets of R hip protocol w/ AROM. Pt would benefit from  PT after dc to improve his mobility, strength, and endurance.  -PH     Row Name 09/17/20 1025          Positioning and Restraints    Pre-Treatment Position  in bed  -PH     Post Treatment Position  chair  -PH     In Chair  reclined;call light within reach;encouraged to call for assist;exit alarm on  -PH       User Key  (r) = Recorded By, (t) = Taken By, (c) = Cosigned By    Initials Name Provider Type     Lilian Jackson PTA Physical Therapy Assistant        Outcome Measures     Row Name 09/17/20  1028          How much help from another person do you currently need...    Turning from your back to your side while in flat bed without using bedrails?  4  -PH     Moving from lying on back to sitting on the side of a flat bed without bedrails?  4  -PH     Moving to and from a bed to a chair (including a wheelchair)?  4  -PH     Standing up from a chair using your arms (e.g., wheelchair, bedside chair)?  3  -PH     Climbing 3-5 steps with a railing?  3  -PH     To walk in hospital room?  4  -PH     AM-PAC 6 Clicks Score (PT)  22  -PH     Row Name 09/17/20 1028          Functional Assessment    Outcome Measure Options  AM-PAC 6 Clicks Basic Mobility (PT)  -       User Key  (r) = Recorded By, (t) = Taken By, (c) = Cosigned By    Initials Name Provider Type     Lilian Jackson PTA Physical Therapy Assistant        Physical Therapy Education                 Title: PT OT SLP Therapies (Done)     Topic: Physical Therapy (Done)     Point: Mobility training (Done)     Learning Progress Summary           Patient Acceptance, E,D, VU,DU by  at 9/17/2020 1028    Acceptance, E, NR by AR at 9/16/2020 1440                   Point: Home exercise program (Done)     Learning Progress Summary           Patient Acceptance, E,D, VU,DU by  at 9/17/2020 1028    Acceptance, E, NR by AR at 9/16/2020 1440                   Point: Body mechanics (Done)     Learning Progress Summary           Patient Acceptance, E,D, VU,DU by  at 9/17/2020 1028    Acceptance, E, NR by AR at 9/16/2020 1440                   Point: Precautions (Done)     Learning Progress Summary           Patient Acceptance, E,D, VU,DU by  at 9/17/2020 1028    Acceptance, E, NR by AR at 9/16/2020 1440                               User Key     Initials Effective Dates Name Provider Type Discipline    AR 04/03/18 -  Antonia Thompson, PT Physical Therapist PT     08/20/19 -  Lilian Jackson PTA Physical Therapy Assistant PT              PT  Recommendation and Plan     Plan of Care Reviewed With: patient  Progress: improving  Outcome Summary: Pt stating he was feeling better today when working w/ PT after changing pain meds prior to session. Pt amb 160' req SBA/SV and use of fww. Pt stable and attempting B step through. Pt asc/dsc 4 steps w/ educ on seq on stairs - mildly instability noted. Pt performed 2 sets of R hip protocol w/ AROM. Pt would benefit from  PT after dc to improve his mobility, strength, and endurance.     Time Calculation:   PT Charges     Row Name 09/17/20 1029             Time Calculation    Start Time  0943  -PH      Stop Time  1007  -PH      Time Calculation (min)  24 min  -PH      PT Received On  09/17/20  -PH      PT - Next Appointment  09/18/20  -        User Key  (r) = Recorded By, (t) = Taken By, (c) = Cosigned By    Initials Name Provider Type    PH Lilian Jackson PTA Physical Therapy Assistant        Therapy Charges for Today     Code Description Service Date Service Provider Modifiers Qty    [id number]  PT THER PROC EA 15 MIN 9/17/2020 Lilian Jackson PTA GP 2          PT G-Codes  Outcome Measure Options: AM-PAC 6 Clicks Basic Mobility (PT)  AM-PAC 6 Clicks Score (PT): 22    Lilian Jackson PTA  9/17/2020

## 2020-09-17 NOTE — PROGRESS NOTES
Case Management Discharge Note      Final Note: home with Intreptid    Provided Post Acute Provider List?: Yes  Post Acute Provider List: Home Health  Delivered To: Support Person  Method of Delivery: Telephone    Selected Continued Care - Discharged on 9/17/2020 Admission date: 9/16/2020 - [place] [facility] [place]ination    No services have been selected for the patient.              Durable Medical Equipment    No services have been selected for the patient.              Dialysis/Infusion    No services have been selected for the patient.              Home Medical Care Coordination complete    Service Provider Selected Services Address Phone Fax    [facility], [facility] [phone]          Therapy    No services have been selected for the patient.              Community Resources    No services have been selected for the patient.                       Final Discharge Disposition Code: 06 - home with home health care

## 2020-09-21 NOTE — OP NOTE
Name: Coleman Bear  YOB: 1945    DATE OF SURGERY: 9/17/2020    PREOPERATIVE DIAGNOSIS: Right hip end-stage osteoarthritis    POSTOPERATIVE DIAGNOSIS: Right hip end-stage osteoarthritis    PROCEDURE PERFORMED: Right  total hip replacement    SURGEON: Kris Pal M.D.    ASSISTANT: MARTIN BAUTISTA    IMPLANTS:  Implant Name Type Inv. Item Serial No.  Lot No. LRB No. Used Action   SUT CONTRL TISS STRATAFIX SYMM PDS PLUS MAEVE CT-1 60CM - [id number] Implant SUT CONTRL TISS STRATAFIX SYMM PDS PLUS MAEVE CT-1 60CM  [employer]  DIV OF J AND J [id number] Right 1 Implanted   SUT CONTRL TISS STRATAFIXSPIRALMNCRYL PLSPS2 REV3/0 45CM - [id number] Implant SUT CONTRL TISS STRATAFIXSPIRALMNCRYL PLSPS2 REV3/0 45CM  ETH[employer]  DIV OF J AND J [id number] Right 1 Implanted   SHLL ACET R3 3H [id number] - [id number] Implant SHLL ACET R3 3H [id number]  [employer] [id number] Right 1 Implanted   LINER ACET R3 XLPE 20D [id number] - [id number] Implant LINER ACET R3 XLPE 20D [id number]  [employer] NEP[employer] [id number] Right 1 Implanted   SCRW SPH HD REFLECTION 6.5X30MM - [id number] Implant SCRW SPH HD REFLECTION 6.5X30MM  [employer] NEPHEW [id number] Right 1 Implanted   SCRW SPH HD REFLECTION 6.5X20MM - [id number] Implant SCRW SPH HD REFLECTION 6.5X20MM  [employer] NEPHEW [id number] Right 1 Implanted   STEM POLARSTEM CMTLESS STD TI/HA 3 - [id number] Implant STEM POLARSTEM CMTLESS STD TI/HA 3  SMITH AND NEPHEW [id number] Right 1 Implanted   HD FEM/HIP OXINIUM TPR 12/14 36MM MIN3 - [id number] Implant HD FEM/HIP OXINIUM TPR 12/14 36MM MIN3  [employer] NEPHE[employer] [id number] Right 1 Implanted   TOTL HIP ORQUIDEA SMITH NEPHEW - [id number] Implant TOTL HIP ORQUIDEA KOHLER NEPHEW  [employer]  Right 1 Implanted       Estimated Blood Loss: 200cc  Specimens : none  Complications: none    DESCRIPTION OF PROCEDURE:    The patient was taken to the operating room and placed in the supine position. A sequential compression device was carefully placed on the  non-operative leg. Preoperative antibiotics were administered. Surgical time out was performed. After adequate induction of anesthesia the patient was then transferred onto the  table and positioned appropriately in the lateral decubitus position. The hip was then prepped and draped in the usual sterile fashion.    A posterior lateral surgical incision was then made.  The gluteus arvin fascia was then divided the gluteus arvin muscle was then bluntly dissected.  A Charnley self-retaining retractor was then placed.  A posterior capsulotomy was then performed.  The superior limb was divided in line with the piriformis tendon.  The hip was then dislocated.  There were end-stage arthritic findings.  The femoral neck osteotomy was performed according to the level dictated by the template.  The acetabulum was exposed with standard retractors.  The labrum and pulvinar were then excised.  The cup was then medialized with the starting acetabular reamer to the medial wall.  I then progressively reamed up to the appropriate size and 45° of abduction and 20° of anteversion. Line to line reaming was performed. At this point the bone was nicely prepared there was excellent bleeding bone. We then impacted the acetabular component in 45 of abduction and 20 of anteversion the cup was stable.  Per routine we augmented the fixation with 2 screws in the posterior and superior quadrant  The final liner was then placed and it locked in nicely.  At this point we injected the hip with anesthetic cocktail solution.  We then turned our attention to the femur.   Standard retractors were placed to expose the femur.  The box osteotome was used to create a starting point.  The canal finder was then used to sound the canal.  The lateralizing reamer was then used to lateralize into the greater trochanter.  We progressively reamed and broached until the broach was very solid to axial and rotational stresses.  At this point we placed the trial  neck and head hip was very stable to flexion and internal rotation as well as extension and external rotation.  The leg lengths were measured to be equal.  We then removed the trial components,copiously irrigated the hip, and then impacted the final stem.  At this point we then trialed and chose the final head. The head was placed on a clean dry taper.  The leg lengths were again measured to be equal.  At this point the hip was copiously irrigated with pulse lavage and the capsule was then reapproximated with #1 PDS suture, and the remainder of the hip was closed in multiple layers in standard fashion..  There was excellent hemostasis. We placed a one-eighth inch Hemovac drain.  Sterile dressing were applied. At the end of the case, the sponge and needle counts were reported as being correct. There were no known complications. The patient was then transported to the recovery room.      Kris Pal M.D.  9/21/2020

## 2020-10-01 ENCOUNTER — OFFICE VISIT (OUTPATIENT)
Dept: ORTHOPEDIC SURGERY | Facility: CLINIC | Age: 75
End: 2020-10-01

## 2020-10-01 DIAGNOSIS — Z96.641 STATUS POST TOTAL REPLACEMENT OF RIGHT HIP: Primary | ICD-10-CM

## 2020-10-01 PROCEDURE — 73502 X-RAY EXAM HIP UNI 2-3 VIEWS: CPT | Performed by: ORTHOPAEDIC SURGERY

## 2020-10-01 PROCEDURE — 99024 POSTOP FOLLOW-UP VISIT: CPT | Performed by: ORTHOPAEDIC SURGERY

## 2020-10-01 NOTE — PROGRESS NOTES
Coleman Bear : 1945 MRN: [id number] DATE: 10/1/2020    DIAGNOSIS: 2 week follow up right total hip     SUBJECTIVE:Patient returns today for 2 week follow up of right total hip replacement. Patient reports doing well with no unusual complaints. Appears to be progressing appropriately.    OBJECTIVE:   Exam:. The incision is healing appropriately. No sign of infection. Range of motion is progressing as expected. The calf is soft and nontender with a negative Homans sign.    DIAGNOSTIC STUDIES  Xrays: 2 views of the right hip (AP pelvis and lateral right hip) were ordered and reviewed for evaluation of recent hip replacement. They demonstrate a well positioned, well aligned hip replacement without complicating factors noted. In comparison with previous films there has been interval implant placement.    ASSESSMENT: 2 week status post right hip replacement.    PLAN: 1) Staples removed and steri strips applied   2) PT exercises   3) Discontinue CONSTANTINE hose   4) Continue ice PRN   5) WBAT   6) aspirin 81 mg orally every day for 1 month   7) Follow up in 6 weeks with repeat Xrays of right hip (2views)    Kris Pal MD  10/1/2020

## 2020-11-19 ENCOUNTER — OFFICE VISIT (OUTPATIENT)
Dept: ORTHOPEDIC SURGERY | Facility: CLINIC | Age: 75
End: 2020-11-19

## 2020-11-19 VITALS — TEMPERATURE: 96.8 F | HEIGHT: 69 IN | BODY MASS INDEX: 24.88 KG/M2 | WEIGHT: 168 LBS

## 2020-11-19 DIAGNOSIS — R52 PAIN: Primary | ICD-10-CM

## 2020-11-19 PROCEDURE — 99024 POSTOP FOLLOW-UP VISIT: CPT | Performed by: NURSE PRACTITIONER

## 2020-11-19 PROCEDURE — 73501 X-RAY EXAM HIP UNI 1 VIEW: CPT | Performed by: NURSE PRACTITIONER

## 2020-11-19 NOTE — PROGRESS NOTES
Coleman Bear : 1945 MRN: [id number] DATE: 2020    DIAGNOSIS: 8 week follow up right total hip     SUBJECTIVE:Patient returns today for 8 week follow up of right total hip replacement. Patient reports doing well with no unusual complaints. Appears to be progressing appropriately and is off a cane    OBJECTIVE:   Exam:. The incision is healed. No sign of infection. Range of motion is progressing as expected. The calf is soft and nontender with a negative Homans sign. Strength progressing    DIAGNOSTIC STUDIES  Xrays: 2 views of the right hip (AP pelvis and lateral right hip) were ordered and reviewed for evaluation of recent hip replacement. They demonstrate a well positioned, well aligned hip replacement without complicating factors noted. In comparison with previous films there has been interval implant placement.    ASSESSMENT: 8 week status post right hip replacement.    PLAN: 1) Activity as tolerated   2) Continue hip strengthening exercises    3) Follow up 1 year post-op with repeat Xrays of right hip (2views AP Pelvis      and lateral left hip)    Amy Nelson, ESDRAS    2020

## 2021-09-30 ENCOUNTER — OFFICE VISIT (OUTPATIENT)
Dept: ORTHOPEDIC SURGERY | Facility: CLINIC | Age: 76
End: 2021-09-30

## 2021-09-30 VITALS — WEIGHT: 167.8 LBS | HEIGHT: 69 IN | TEMPERATURE: 96.8 F | BODY MASS INDEX: 24.85 KG/M2

## 2021-09-30 DIAGNOSIS — Z96.643 HISTORY OF BILATERAL HIP ARTHROPLASTY: Primary | ICD-10-CM

## 2021-09-30 PROCEDURE — 99212 OFFICE O/P EST SF 10 MIN: CPT | Performed by: ORTHOPAEDIC SURGERY

## 2021-09-30 PROCEDURE — 73521 X-RAY EXAM HIPS BI 2 VIEWS: CPT | Performed by: ORTHOPAEDIC SURGERY

## 2021-09-30 NOTE — PROGRESS NOTES
"Coleman Bear : 1945 MRN: [id number] DATE: 2021    Chief Complaint:  Follow up right total hip      SUBJECTIVE:Patient returns today for  1 year follow up of right total hip replacement. Patient reports doing well with no unusual complaints. Denies any limitations due to the hip.    OBJECTIVE:    Temp 96.8 °F (36 °C)   Ht 175.3 cm (69\")   Wt 76.1 kg (167 lb 12.8 oz)   BMI 24.78 kg/m²   Family History   Problem Relation Age of Onset   • Hypertension Other    • Cancer Mother         breast   • Emphysema Father    • Asthma Father    • Malig Hyperthermia Neg Hx      Past Medical History:   Diagnosis Date   • Arthritis    • BPH (benign prostatic hyperplasia)    • Chronic pain disorder    • High cholesterol    • Hip pain, right    • HTN (hypertension)    • Low back pain    • Lumbar stenosis     ABOVE THE L3-5 FUSION   • Postoperative urinary retention     REQUIRES CATHETER REMAINING FOR DAYS POST OP PER UROLOGY   • Shoulder pain      Past Surgical History:   Procedure Laterality Date   • ANTERIOR CERVICAL DISCECTOMY W/ FUSION      LIMITED RANGE OF MOTION   • HERNIA REPAIR      ing   • LUMBAR DISCECTOMY  1992    AND FUSION   • LUMBAR DISCECTOMY FUSION INSTRUMENTATION N/A 2017    Procedure: L2-L3 repeat laminectomy and fusion with instrumentation, removal instrumentation L3 to L5;  Surgeon: Cameron Mcneil MD;  Location: [facility];  Service:    • LUMBAR EPIDURAL INJECTION     • LUMBAR LAMINECTOMY WITH FUSION  2006   • PROSTATE SURGERY     • SHOULDER ARTHROSCOPY W/ SUBACROMIAL DECOMPRESSION AND DISTAL CLAVICLE EXCISION Right 2011    AND EXTENSIVE ROTATOR CUFF DEBRIDEMENT   • TOTAL HIP ARTHROPLASTY Left 3/16/2018    Procedure: TOTAL HIP ARTHROPLASTY;  Surgeon: Kris Pal MD;  Location: [facility];  Service: Orthopedics   • TOTAL HIP ARTHROPLASTY Right 2020    Procedure: TOTAL HIP ARTHROPLASTY;  Surgeon: Kris Pal MD;  Location: [facility] OR;  Service: " Orthopedics;  Laterality: Right;     Social History     Socioeconomic History   • Marital status:      Spouse name: Not on file   • Number of children: Not on file   • Years of education: Not on file   • Highest education level: Not on file   Tobacco Use   • Smoking status: Never Smoker   • Smokeless tobacco: Never Used   Substance and Sexual Activity   • Alcohol use: No   • Drug use: No       Review of Systems: 14 point review of systems performed pertinent positives and negatives discussed above, all other systems are negative    Exam:. The incision is well healed. Range of motion is good without irritability. The calf is soft and nontender with a negative Homans sign. Alignment is neutral. Leg lengths are equal. Good hip flexion and abduction strength.Walks with nonantalgic gait. Intact to light touch with palpable distal pulses.     DIAGNOSTIC STUDIES  Xrays:Xrays 2 view right hip AP and lateral were ordered and reviewed for evaluation of total hip which demonstrate a well positioned DHRUV without complicating factors.  In comparison to previous films are no changes.    ASSESSMENT:    Follow up right hip replacement. doing well       PLAN:   Continue activities as tolerated  Follow up JULIANN Pal MD  9/30/2021

## 2022-07-05 ENCOUNTER — OFFICE VISIT (OUTPATIENT)
Dept: ORTHOPEDIC SURGERY | Facility: CLINIC | Age: 77
End: 2022-07-05

## 2022-07-05 VITALS — TEMPERATURE: 97.3 F | HEIGHT: 69 IN | WEIGHT: 165 LBS | BODY MASS INDEX: 24.44 KG/M2

## 2022-07-05 DIAGNOSIS — M54.50 LUMBAR PAIN: Primary | ICD-10-CM

## 2022-07-05 DIAGNOSIS — M54.50 LUMBAR BACK PAIN: ICD-10-CM

## 2022-07-05 DIAGNOSIS — M48.02 CERVICAL STENOSIS OF SPINAL CANAL: ICD-10-CM

## 2022-07-05 DIAGNOSIS — M47.22 CERVICAL SPONDYLOSIS WITH RADICULOPATHY: ICD-10-CM

## 2022-07-05 PROCEDURE — 72100 X-RAY EXAM L-S SPINE 2/3 VWS: CPT | Performed by: ORTHOPAEDIC SURGERY

## 2022-07-05 PROCEDURE — 99214 OFFICE O/P EST MOD 30 MIN: CPT | Performed by: ORTHOPAEDIC SURGERY

## 2022-07-05 NOTE — PROGRESS NOTES
New patient or new problem visit    CC: Low back pain, neck and arm pain    HPI: He has a chronic history of increasing low back pain and neck and arm pain.  5 years ago I did an L2 to 3 fusion and laminectomy will above the prior L3-5 instrumented fusion.  Only the 2 3 hardware remains.  He complains primarily of lumbosacral back pain occasional radiation to the hips as well some neck pain which is radiating to right upper extremity.  .  No balance difficulties no bowel or bladder complaints.  Physical therapy did not help.    PFSH: See attached    ROS: As above    PE: BMI 24.3.  Posture is unremarkable prior wound in the back is well-healed in the midline and there is minimal lumbosacral tenderness.  Good strength in the legs bilaterally.  Patellar reflexes are intact symmetrically as are Achilles.  In the upper extremity brachial radialis and biceps reflexes are absent but triceps reflexes are strong bilaterally.  Strength is good in the upper extremities perhaps a bit of wasting in right first webspace.  Jeffry test is negative.  His balance looks good.    XRAY: Plain film x-rays of the lumbar spine show solid fusion 2-5 hardware at to 3.  He has not been fused at 5 1.  There are degenerative changes at 12 1 and 1 2 above the fusion which are unchanged from prior films.  Cervical MRI in 2013 showed some pretty significant central stenosis even at that time.    Other: n/a    Impression: Lumbosacral back pain, status post L2-5 fusion with instrumentation.  Hardware at L2-3 only.  Cervical spondylosis with radiculopathy and history of cervical canal stenosis    Plan: I am recommending that he go ahead and get cervical and lumbar MRIs at this time.  He might be a candidate for epidural injection for the lumbar spine but he is already failed physical therapy and if there is no improvement there may be a debate as to whether or not to treat the neck before the back as he had fairly significant stenosis in the past  and I at least want to see what we are currently dealing with before he undergoes any general endotracheal intubation.  If he gets the MRIs in [place] I want him to  the CD and mail it to our office.

## 2022-07-07 ENCOUNTER — TELEPHONE (OUTPATIENT)
Dept: ORTHOPEDIC SURGERY | Facility: CLINIC | Age: 77
End: 2022-07-07

## 2022-07-12 ENCOUNTER — TELEPHONE (OUTPATIENT)
Dept: ORTHOPEDIC SURGERY | Facility: CLINIC | Age: 77
End: 2022-07-12

## 2022-07-12 DIAGNOSIS — M47.22 CERVICAL SPONDYLOSIS WITH RADICULOPATHY: Primary | ICD-10-CM

## 2022-07-12 DIAGNOSIS — M54.50 LUMBAR PAIN: ICD-10-CM

## 2022-07-19 NOTE — TELEPHONE ENCOUNTER
Please call him today or tomorrow.  I reviewed the cervical and lumbar MRIs personally.  Not much going on at the lumbar level, at least in the way of neurologic compression.  Might consider epidurals for that but I probably stay away from surgery for the lumbar spine.  With regard to the neck there is stenosis above and below the previously operated levels, but not at levels which are dangerous to his spinal cord.  He could certainly try an epidural there if he is wanting to avoid surgery.  If not, then I want him to see Marshall Brasher for further evaluation and possible treatment.  I do not see an easy fix that for sure.

## 2022-07-20 NOTE — TELEPHONE ENCOUNTER
Have spoke with patient.  Have advised him that [facility] has reviewed both the cervical and lumbar MRIs.  Lumbar spine MRI does not show any neurologic compression.  Dr. Mcneil is recommending lumbar epidurals.  He is continue to have some back pain and would like to proceed with a lumbar epidurals.  In regards to his cervical spine it does show there is some stenosis above and below the previous levels that he had surgery and he would recommend trying epidurals for those if the patient is wanting to avoid surgery.  Have advised him that if he does indeed want to consider surgery would recommend that he see Dr. Kaleb Perkins for further evaluation and treatment.  At this point the patient would like to proceed with the lumbar epidurals and would like to go ahead and see Dr. Perkins for his opinion.

## 2022-08-05 ENCOUNTER — ANESTHESIA (OUTPATIENT)
Dept: PAIN MEDICINE | Facility: HOSPITAL | Age: 77
End: 2022-08-05

## 2022-08-05 ENCOUNTER — HOSPITAL ENCOUNTER (OUTPATIENT)
Dept: PAIN MEDICINE | Facility: HOSPITAL | Age: 77
Discharge: HOME | End: 2022-08-05

## 2022-08-05 ENCOUNTER — ANESTHESIA EVENT (OUTPATIENT)
Dept: PAIN MEDICINE | Facility: HOSPITAL | Age: 77
End: 2022-08-05

## 2022-08-05 ENCOUNTER — HOSPITAL ENCOUNTER (OUTPATIENT)
Dept: GENERAL RADIOLOGY | Facility: HOSPITAL | Age: 77
Discharge: HOME | End: 2022-08-05

## 2022-08-05 VITALS
BODY MASS INDEX: 25.18 KG/M2 | HEART RATE: 58 BPM | OXYGEN SATURATION: 97 % | RESPIRATION RATE: 16 BRPM | DIASTOLIC BLOOD PRESSURE: 85 MMHG | SYSTOLIC BLOOD PRESSURE: 140 MMHG | HEIGHT: 69 IN | TEMPERATURE: 98 F | WEIGHT: 170 LBS

## 2022-08-05 DIAGNOSIS — M54.50 LUMBAR PAIN: ICD-10-CM

## 2022-08-05 DIAGNOSIS — M99.53 INTERVERTEBRAL DISC STENOSIS OF NEURAL CANAL OF LUMBAR REGION: Primary | ICD-10-CM

## 2022-08-05 DIAGNOSIS — R52 PAIN: ICD-10-CM

## 2022-08-05 PROCEDURE — 77003 FLUOROGUIDE FOR SPINE INJECT: CPT

## 2022-08-05 PROCEDURE — 0 IOPAMIDOL 41 % SOLUTION: Performed by: ANESTHESIOLOGY

## 2022-08-05 PROCEDURE — 25010000002 METHYLPREDNISOLONE PER 80 MG: Performed by: ANESTHESIOLOGY

## 2022-08-05 RX ORDER — MIDAZOLAM HYDROCHLORIDE 1 MG/ML
1 INJECTION INTRAMUSCULAR; INTRAVENOUS AS NEEDED
Status: DISCONTINUED | OUTPATIENT
Start: 2022-08-05 | End: 2022-08-06 | Stop reason: HOSPADM

## 2022-08-05 RX ORDER — FENTANYL CITRATE 50 UG/ML
50 INJECTION, SOLUTION INTRAMUSCULAR; INTRAVENOUS AS NEEDED
Status: DISCONTINUED | OUTPATIENT
Start: 2022-08-05 | End: 2022-08-06 | Stop reason: HOSPADM

## 2022-08-05 RX ORDER — LIDOCAINE HYDROCHLORIDE 10 MG/ML
1 INJECTION, SOLUTION INFILTRATION; PERINEURAL ONCE AS NEEDED
Status: DISCONTINUED | OUTPATIENT
Start: 2022-08-05 | End: 2022-08-06 | Stop reason: HOSPADM

## 2022-08-05 RX ORDER — METHYLPREDNISOLONE ACETATE 80 MG/ML
80 INJECTION, SUSPENSION INTRA-ARTICULAR; INTRALESIONAL; INTRAMUSCULAR; SOFT TISSUE ONCE
Status: COMPLETED | OUTPATIENT
Start: 2022-08-05 | End: 2022-08-05

## 2022-08-05 RX ORDER — SODIUM CHLORIDE 0.9 % (FLUSH) 0.9 %
1-10 SYRINGE (ML) INJECTION AS NEEDED
Status: DISCONTINUED | OUTPATIENT
Start: 2022-08-05 | End: 2022-08-06 | Stop reason: HOSPADM

## 2022-08-05 RX ADMIN — IOPAMIDOL 10 ML: 408 INJECTION, SOLUTION INTRATHECAL at 13:48

## 2022-08-05 RX ADMIN — METHYLPREDNISOLONE ACETATE 80 MG: 80 INJECTION, SUSPENSION INTRA-ARTICULAR; INTRALESIONAL; INTRAMUSCULAR; SOFT TISSUE at 13:48

## 2022-08-05 NOTE — ANESTHESIA PROCEDURE NOTES
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    The patient was observed in recovery with no evidence of neurological deficits or other problems. All questions were answered. The patient was discharged with appropriate instructions.  Prep:  Pt Position:prone  Sterile Tech:cap, gloves, mask and sterile barrier  Prep:chlorhexidine gluconate and isopropyl alcohol  Monitoring:blood pressure monitoring, continuous pulse oximetry and EKG  Procedure:Sedation: no     Approach:right paramedian  Guidance: fluoroscopy  Location:lumbar  Level:2-3 (Interlaminar)  Needle Type:Tuohy  Needle Gauge:20 G  Aspiration:negative  Medications:  Preservative Free Saline:3mL  Isovue:2mL  Comments:Isovue dye spread was consistent with epidural placement.Depomedrol:80 mg  Post Assessment:  Dressing:occlusive dressing applied  Pt Tolerance:patient tolerated the procedure well with no apparent complications  Complications:no

## 2022-08-05 NOTE — H&P
[facility]    History and Physical    Patient Name: Coleman Bear  :  1945  MRN:  [id number]  Date of Admission: 2022    Subjective     Patient is a 77 y.o. male presents with chief complaint of chronic, intermitent, severe low back and left lower extremity pain.  Onset of symptoms was gradual starting 1 month ago.  Symptoms are associated/aggravated by lifting, lying down, sitting or walking for more than a few minutes. Symptoms improve with ice and rest.  He is undergone a previous lumbar fusion with instrumentation from L2-L5.  On a pain scale from 0-10, he rates his pain as a 9 while at rest and a 10 with activity.  He describes the pain as stabbing and throbbing in nature.  He was referred to the pain clinic for a series of lumbar epidural steroid injections.      His most recent lumbar MRI shows mild central canal stenosis from disc bulging and bilateral facet hypertrophy at L1-2.  At L5-S1, there is mild left foraminal narrowing from posterior inferior endplate osteophyte of L5.    The following portions of the patients history were reviewed and updated as appropriate: current medications, allergies, past medical history, past surgical history, past family history, past social history and problem list                Objective     Past Medical History:   Past Medical History:   Diagnosis Date   • Arthritis    • BPH (benign prostatic hyperplasia)    • Chronic pain disorder    • High cholesterol    • Hip pain, right    • HTN (hypertension)    • Low back pain    • Lumbar stenosis     ABOVE THE L3-5 FUSION   • Postoperative urinary retention     REQUIRES CATHETER REMAINING FOR DAYS POST OP PER UROLOGY   • Shoulder pain      Past Surgical History:   Past Surgical History:   Procedure Laterality Date   • ANTERIOR CERVICAL DISCECTOMY W/ FUSION      LIMITED RANGE OF MOTION   • EPIDURAL BLOCK     • HERNIA REPAIR      ing   • LUMBAR DISCECTOMY  1992    AND FUSION   • LUMBAR DISCECTOMY FUSION  "INSTRUMENTATION N/A 02/16/2017    Procedure: L2-L3 repeat laminectomy and fusion with instrumentation, removal instrumentation L3 to L5;  Surgeon: Cameron Mcneil MD;  Location: [facility];  Service:    • LUMBAR EPIDURAL INJECTION     • LUMBAR LAMINECTOMY WITH FUSION  08/24/2006   • PROSTATE SURGERY     • SHOULDER ARTHROSCOPY W/ SUBACROMIAL DECOMPRESSION AND DISTAL CLAVICLE EXCISION Right 03/04/2011    AND EXTENSIVE ROTATOR CUFF DEBRIDEMENT   • TOTAL HIP ARTHROPLASTY Left 03/16/2018    Procedure: TOTAL HIP ARTHROPLASTY;  Surgeon: Kris Pal MD;  Location: [facility] OR;  Service: Orthopedics   • TOTAL HIP ARTHROPLASTY Right 09/16/2020    Procedure: TOTAL HIP ARTHROPLASTY;  Surgeon: Kris Pal MD;  Location: [facility] OR;  Service: Orthopedics;  Laterality: Right;     Family History:   Family History   Problem Relation Age of Onset   • Hypertension Other    • Cancer Mother         breast   • Emphysema Father    • Asthma Father    • Malig Hyperthermia Neg Hx      Social History:   Social History     Socioeconomic History   • Marital status:    Tobacco Use   • Smoking status: Never Smoker   • Smokeless tobacco: Never Used   Substance and Sexual Activity   • Alcohol use: No   • Drug use: No       Vital Signs Range for the last 24 hours  Temperature: Temp:  [36.7 °C (98 °F)] 36.7 °C (98 °F)   Temp Source: Temp src: Infrared   BP: BP: (147)/(89) 147/89   Pulse: Heart Rate:  [50] 50   Respirations: Resp:  [16] 16   SPO2: SpO2:  [100 %] 100 %   O2 Amount (l/min):     O2 Devices Device (Oxygen Therapy): room air   Weight: Weight:  [77.1 kg (170 lb)] 77.1 kg (170 lb)     Flowsheet Rows    Flowsheet Row First Filed Value   Admission Height 175.3 cm (69\") Documented at 08/05/2022 1307   Admission Weight 77.1 kg (170 lb) Documented at 08/05/2022 1307          --------------------------------------------------------------------------------    Current Outpatient Medications   Medication Sig Dispense Refill "   • ACETAMINOPHEN PO Take 650 mg by mouth Every 6 (Six) Hours As Needed for Mild Pain  or Moderate Pain .     • amLODIPine (NORVASC) 10 MG tablet Take 10 mg by mouth Every Morning.     • atorvastatin (LIPITOR) 20 MG tablet Take 20 mg by mouth Every Morning.     • pantoprazole (PROTONIX) 40 MG EC tablet Take 1 tablet by mouth Daily. 14 tablet 0   • triamterene-hydrochlorothiazide (MAXZIDE) 75-50 MG per tablet Take 1 capsule by mouth Every Morning.     • ondansetron (ZOFRAN) 4 MG tablet Take 1 tablet by mouth Every 6 (Six) Hours As Needed for Nausea or Vomiting. 10 tablet 0     No current facility-administered medications for this encounter.     Facility-Administered Medications Ordered in Other Encounters   Medication Dose Route Frequency Provider Last Rate Last Admin   • Chlorhexidine Gluconate 2 % pads 2 each  2 pad Apply externally BID Kris Pal MD           --------------------------------------------------------------------------------  Assessment & Plan      Anesthesia Evaluation     Patient summary reviewed and Nursing notes reviewed   NPO Solid Status: > 8 hours  NPO Liquid Status: > 2 hours    Pain impairs ability to perform ADLs: Housekeeping, Driving, Working and Exercise/Activity  Modalities previously tried to control pain with limited effectiveness within the last 4-6 weeks: Rest and OTC medications     Airway   Mallampati: II  TM distance: >3 FB  Neck ROM: full  Dental - normal exam     Pulmonary - negative pulmonary ROS and normal exam    breath sounds clear to auscultation  Cardiovascular - normal exam    Rhythm: regular  Rate: normal    (+) hypertension, hyperlipidemia,   (-) angina, orthopnea, PND, [id number]      Neuro/Psych- negative ROS  GI/Hepatic/Renal/Endo - negative ROS     Musculoskeletal     (+) chronic pain,   Abdominal    Substance History - negative use     OB/GYN negative ob/gyn ROS         Other   arthritis,                 Diagnosis and Plan    Treatment Plan  ASA 2      Procedures:  Lumbar Epidural Steroid Injection(LESI), With fluoroscopy,       Anesthetic plan and risks discussed with patient.        Alternative management options include physical therapy, medical management with nonsteroidal anti-inflammatory medications or narcotics, chiropractic manipulation and surgical intervention.    1.  Lumbar epidural steroid injections, up to 3, spaced 1-2 weeks apart.  If pain control is acceptable after 1 or 2 injections, it was discussed with the patient that they may return for the subsequent injections if and when their pain returns.  The risks were discussed with the patient including failure of relief, worsening pain, Headache (post dural puncture headache), bleeding (epidural hematoma) and infection (epidural abscess or skin infection).  2.  Physical therapy exercises at home as prescribed by physical therapy or from the pain clinic handout (given to the patient).  Continuation of these exercises every day, or multiple times per week, even when the patient has good pain relief, was stressed to the patient as a preventative measure to decrease the frequency and severity of future pain episodes.  3.  Continue pain medicines as already prescribed.  If patient not currently taking any, it is recommended to begin Acetaminophen 1000 mg po q 8 hours.  If other medicines containing Acetaminophen are currently prescribed, maintain daily dose at 3000 mg.    4.  If they can tolerate NSAIDS, it is recommended to take Ibuprofen 600 mg po q 6 hours for 7 days during pain exacerbations.  Alternatively, they may substitute an NSAID of their choice (e.g. Aleve).  This may be taken at the same time as Acetaminophen.  5.  Heat and ice to the affected area as tolerated for pain control.  It was discussed that heating pads can cause burns.  6.  Daily low impact exercise such as walking or water exercise was recommended to maintain overall health and aid in weight control.   7.  Follow up as needed for subsequent  injections.  8.  Patient was counseled to abstain from tobacco products.    Diagnosis     * Intervertebral disc stenosis of neural canal of lumbar region [M99.53]

## 2022-08-31 ENCOUNTER — DOCUMENTATION (OUTPATIENT)
Dept: ORTHOPEDIC SURGERY | Facility: CLINIC | Age: 77
End: 2022-08-31

## 2022-09-07 ENCOUNTER — ANESTHESIA EVENT (OUTPATIENT)
Dept: PAIN MEDICINE | Facility: HOSPITAL | Age: 77
End: 2022-09-07

## 2022-09-07 ENCOUNTER — HOSPITAL ENCOUNTER (OUTPATIENT)
Dept: GENERAL RADIOLOGY | Facility: HOSPITAL | Age: 77
Discharge: HOME | End: 2022-09-07

## 2022-09-07 ENCOUNTER — HOSPITAL ENCOUNTER (OUTPATIENT)
Dept: PAIN MEDICINE | Facility: HOSPITAL | Age: 77
Discharge: HOME | End: 2022-09-07

## 2022-09-07 ENCOUNTER — ANESTHESIA (OUTPATIENT)
Dept: PAIN MEDICINE | Facility: HOSPITAL | Age: 77
End: 2022-09-07

## 2022-09-07 VITALS
RESPIRATION RATE: 16 BRPM | DIASTOLIC BLOOD PRESSURE: 100 MMHG | SYSTOLIC BLOOD PRESSURE: 131 MMHG | HEART RATE: 51 BPM | TEMPERATURE: 98.6 F | OXYGEN SATURATION: 97 %

## 2022-09-07 DIAGNOSIS — R52 PAIN: ICD-10-CM

## 2022-09-07 DIAGNOSIS — M99.53 INTERVERTEBRAL DISC STENOSIS OF NEURAL CANAL OF LUMBAR REGION: ICD-10-CM

## 2022-09-07 PROCEDURE — 0 IOPAMIDOL 41 % SOLUTION: Performed by: ANESTHESIOLOGY

## 2022-09-07 PROCEDURE — 25010000002 MIDAZOLAM PER 1 MG: Performed by: ANESTHESIOLOGY

## 2022-09-07 PROCEDURE — 77003 FLUOROGUIDE FOR SPINE INJECT: CPT

## 2022-09-07 PROCEDURE — 25010000002 METHYLPREDNISOLONE PER 80 MG: Performed by: ANESTHESIOLOGY

## 2022-09-07 RX ORDER — FENTANYL CITRATE 50 UG/ML
50 INJECTION, SOLUTION INTRAMUSCULAR; INTRAVENOUS AS NEEDED
Status: DISCONTINUED | OUTPATIENT
Start: 2022-09-07 | End: 2022-09-08 | Stop reason: HOSPADM

## 2022-09-07 RX ORDER — LIDOCAINE HYDROCHLORIDE 10 MG/ML
1 INJECTION, SOLUTION INFILTRATION; PERINEURAL ONCE AS NEEDED
Status: DISCONTINUED | OUTPATIENT
Start: 2022-09-07 | End: 2022-09-08 | Stop reason: HOSPADM

## 2022-09-07 RX ORDER — SODIUM CHLORIDE 0.9 % (FLUSH) 0.9 %
10 SYRINGE (ML) INJECTION AS NEEDED
Status: DISCONTINUED | OUTPATIENT
Start: 2022-09-07 | End: 2022-09-08 | Stop reason: HOSPADM

## 2022-09-07 RX ORDER — MIDAZOLAM HYDROCHLORIDE 1 MG/ML
1 INJECTION INTRAMUSCULAR; INTRAVENOUS AS NEEDED
Status: DISCONTINUED | OUTPATIENT
Start: 2022-09-07 | End: 2022-09-08 | Stop reason: HOSPADM

## 2022-09-07 RX ORDER — SODIUM CHLORIDE 0.9 % (FLUSH) 0.9 %
10 SYRINGE (ML) INJECTION EVERY 12 HOURS SCHEDULED
Status: DISCONTINUED | OUTPATIENT
Start: 2022-09-07 | End: 2022-09-08 | Stop reason: HOSPADM

## 2022-09-07 RX ORDER — METHYLPREDNISOLONE ACETATE 80 MG/ML
80 INJECTION, SUSPENSION INTRA-ARTICULAR; INTRALESIONAL; INTRAMUSCULAR; SOFT TISSUE ONCE
Status: COMPLETED | OUTPATIENT
Start: 2022-09-07 | End: 2022-09-07

## 2022-09-07 RX ORDER — SODIUM CHLORIDE 0.9 % (FLUSH) 0.9 %
1-10 SYRINGE (ML) INJECTION AS NEEDED
Status: DISCONTINUED | OUTPATIENT
Start: 2022-09-07 | End: 2022-09-08 | Stop reason: HOSPADM

## 2022-09-07 RX ADMIN — MIDAZOLAM 1 MG: 1 INJECTION INTRAMUSCULAR; INTRAVENOUS at 13:01

## 2022-09-07 RX ADMIN — IOPAMIDOL 10 ML: 408 INJECTION, SOLUTION INTRATHECAL at 13:04

## 2022-09-07 RX ADMIN — METHYLPREDNISOLONE ACETATE 80 MG: 80 INJECTION, SUSPENSION INTRA-ARTICULAR; INTRALESIONAL; INTRAMUSCULAR; SOFT TISSUE at 13:04

## 2022-09-07 NOTE — H&P
[facility]    History and Physical    Patient Name: Coleman Bear  :  1945  MRN:  [id number]  Date of Admission: 2022    Subjective     Patient is a 77 y.o. male presents with chief complaint of chronic, moderate low back and leg: left pain.  Onset of symptoms was gradual starting a few months ago.  Symptoms are associated/aggravated by nothing in particular or activity. Symptoms improve with nothing   - reports about 10% pain relief for a few days following prior lesi.  D/w pt attempting placement at a different level this time in hopes of improvement in pain reduction.  Has had lumbar fusion L2-5 w/ instrumentation.  Prior lesi was placed @ L2/3, L1/2 was unable to be accessed per the note.  Presents for lesi.      The following portions of the patients history were reviewed and updated as appropriate: current medications, allergies, past medical history, past surgical history, past family history, past social history and problem list                Objective     Past Medical History:   Past Medical History:   Diagnosis Date   • Arthritis    • BPH (benign prostatic hyperplasia)    • Chronic pain disorder    • High cholesterol    • Hip pain, right    • HTN (hypertension)    • Intervertebral disc stenosis of neural canal of lumbar region 2022   • Low back pain    • Lumbar stenosis     ABOVE THE L3-5 FUSION   • Postoperative urinary retention     REQUIRES CATHETER REMAINING FOR DAYS POST OP PER UROLOGY   • Shoulder pain      Past Surgical History:   Past Surgical History:   Procedure Laterality Date   • ANTERIOR CERVICAL DISCECTOMY W/ FUSION      LIMITED RANGE OF MOTION   • EPIDURAL BLOCK     • HERNIA REPAIR      ing   • LUMBAR DISCECTOMY  1992    AND FUSION   • LUMBAR DISCECTOMY FUSION INSTRUMENTATION N/A 2017    Procedure: L2-L3 repeat laminectomy and fusion with instrumentation, removal instrumentation L3 to L5;  Surgeon: Cameron Mcneil MD;  Location: [facility] OR;   Service:    • LUMBAR EPIDURAL INJECTION     • LUMBAR LAMINECTOMY WITH FUSION  08/24/2006   • PROSTATE SURGERY     • SHOULDER ARTHROSCOPY W/ SUBACROMIAL DECOMPRESSION AND DISTAL CLAVICLE EXCISION Right 03/04/2011    AND EXTENSIVE ROTATOR CUFF DEBRIDEMENT   • TOTAL HIP ARTHROPLASTY Left 03/16/2018    Procedure: TOTAL HIP ARTHROPLASTY;  Surgeon: Kris Pal MD;  Location: [facility] OR;  Service: Orthopedics   • TOTAL HIP ARTHROPLASTY Right 09/16/2020    Procedure: TOTAL HIP ARTHROPLASTY;  Surgeon: Kris Pal MD;  Location: [facility] OR;  Service: Orthopedics;  Laterality: Right;     Family History:   Family History   Problem Relation Age of Onset   • Hypertension Other    • Cancer Mother         breast   • Emphysema Father    • Asthma Father    • Malig Hyperthermia Neg Hx      Social History:   Social History     Socioeconomic History   • Marital status:    Tobacco Use   • Smoking status: Never Smoker   • Smokeless tobacco: Never Used   Substance and Sexual Activity   • Alcohol use: No   • Drug use: No       Vital Signs Range for the last 24 hours  Temperature: Temp:  [37 °C (98.6 °F)] 37 °C (98.6 °F)   Temp Source: Temp src: Infrared   BP: BP: (149)/(87) 149/87   Pulse: Heart Rate:  [54] 54   Respirations: Resp:  [16] 16   SPO2: SpO2:  [97 %] 97 %   O2 Amount (l/min):     O2 Devices Device (Oxygen Therapy): room air   Weight:           --------------------------------------------------------------------------------    Current Outpatient Medications   Medication Sig Dispense Refill   • ACETAMINOPHEN PO Take 650 mg by mouth Every 6 (Six) Hours As Needed for Mild Pain  or Moderate Pain .     • amLODIPine (NORVASC) 10 MG tablet Take 10 mg by mouth Every Morning.     • atorvastatin (LIPITOR) 20 MG tablet Take 20 mg by mouth Every Morning.     • ondansetron (ZOFRAN) 4 MG tablet Take 1 tablet by mouth Every 6 (Six) Hours As Needed for Nausea or Vomiting. 10 tablet 0   • pantoprazole (PROTONIX) 40 MG EC  tablet Take 1 tablet by mouth Daily. 14 tablet 0   • triamterene-hydrochlorothiazide (MAXZIDE) 75-50 MG per tablet Take 1 capsule by mouth Every Morning.       No current facility-administered medications for this encounter.     Facility-Administered Medications Ordered in Other Encounters   Medication Dose Route Frequency Provider Last Rate Last Admin   • Chlorhexidine Gluconate 2 % pads 2 each  2 pad Apply externally BID Kris Pal MD           --------------------------------------------------------------------------------  Assessment & Plan      Anesthesia Evaluation     Patient summary reviewed and Nursing notes reviewed   no history of anesthetic complications:               Airway   Mallampati: II  Dental      Pulmonary - negative pulmonary ROS   Cardiovascular   Exercise tolerance: good (4-7 METS)    (+) hypertension, hyperlipidemia,       Neuro/Psych- negative ROS  GI/Hepatic/Renal/Endo - negative ROS     Musculoskeletal     (+) back pain, chronic pain, radiculopathy  Abdominal    Substance History - negative use     OB/GYN negative ob/gyn ROS         Other   arthritis,                 Diagnosis and Plan    Treatment Plan  ASA 2   Patient has had previous injection/procedure with 50-75% and 10-25% improvement.   Procedures: Lumbar Epidural Steroid Injection(LESI), With fluoroscopy,       Anesthetic plan and risks discussed with patient.          Diagnosis     * Intervertebral disc stenosis of neural canal of lumbar region [M99.53]

## 2022-09-07 NOTE — ANESTHESIA PROCEDURE NOTES
PAIN Epidural block    Pre-sedation assessment completed: 9/7/2022 12:59 PM    Patient reassessed immediately prior to procedure    Patient location during procedure: pain clinic  Start Time: 9/7/2022 12:59 PM  Stop Time: 9/7/2022 1:05 PM  Indication:procedure for pain  Performed By  Anesthesiologist: Maira Marks MD  Preanesthetic Checklist  Completed: patient identified, IV checked, site marked, risks and benefits discussed, surgical consent, monitors and equipment checked, pre-op evaluation and timeout performed  Additional Notes  Fluoro used.    Dx: lumbar intervertebral disc stenosis of the neural canal  Prep:  Pt Position:prone  Sterile Tech:cap, gloves, mask and sterile barrier  Prep:chlorhexidine gluconate and isopropyl alcohol  Monitoring:blood pressure monitoring, continuous pulse oximetry and EKG  Procedure:Sedation: yes     Approach:left paramedian  Guidance: fluoroscopy  Location:lumbar  Level:3-4  Needle Type:Tuohy  Needle Gauge:20  Aspiration:negative  Medications:  Preservative Free Saline:3mL  Isovue:2mL  Comments:B/l spread c/w epidurogramDepomedrol:80  Post Assessment:  Dressing:occlusive dressing applied  Pt Tolerance:patient tolerated the procedure well with no apparent complications  Complications:no

## 2022-10-19 NOTE — PROGRESS NOTES
"Subjective   Patient ID: Coleman Bear is a 77 y.o. male is being seen for consultation today at the request of Merlyn Padilla,* for Cervical spondylosis with radiculopathy. Patient had a C/L-spine at [facility] on 07/08/2022    Treatment: ALEK    Today patient states that he has low back pain that radiates to L leg, and Neck pain that radiates to the R arm. Patient is also having N/T in R arm. Patient denies B/B incontinence.     Patient, Provider, and MA are all wearing a mask in our office today.     History of Present Illness     This patient has been having severe pain in his neck with radiation into his right arm since last spring.  He also has pain in his lower back with radiation into his legs.  It is particularly in his left leg.  The pain is sharp and stabbing on both ends and quite severe.  He has no difficulty with bowel bladder control he has been treated with some epidural blocks in his lumbar spine but is not really helping.  It helped initially but then the pain came back.    The following portions of the patient's history were reviewed and updated as appropriate: allergies, current medications, past family history, past medical history, past social history, past surgical history and problem list.    Review of Systems   Constitutional: Negative for chills and fever.   HENT: Negative for congestion.    Genitourinary: Negative for difficulty urinating and dysuria.   Musculoskeletal: Positive for back pain, gait problem, joint swelling, myalgias, neck pain and neck stiffness.        R arm/L leg   Neurological: Positive for weakness and numbness.       I have reviewed the review of systems as documented by my MA.      Objective     Vitals:    10/20/22 1518   BP: 131/78   Cuff Size: Adult   Pulse: 88   Temp: 97.8 °F (36.6 °C)   SpO2: 96%   Weight: 77.1 kg (170 lb)   Height: 175.3 cm (69\")     Body mass index is 25.1 kg/m².    Tobacco Use: Low Risk    • Smoking Tobacco Use: Never   • " Smokeless Tobacco Use: Never   • Passive Exposure: Not on file          Physical Exam  Constitutional:       Appearance: He is well-developed.   HENT:      Head: Normocephalic and atraumatic.   Eyes:      Extraocular Movements: EOM normal.      Conjunctiva/sclera: Conjunctivae normal.      Pupils: Pupils are equal, round, and reactive to light.   Neck:      Vascular: No carotid bruit.   Neurological:      Mental Status: He is oriented to person, place, and time.      Coordination: Finger-Nose-Finger Test and Heel to Shin Test normal.      Gait: Gait is intact.      Deep Tendon Reflexes:      Reflex Scores:       Tricep reflexes are 2+ on the right side and 2+ on the left side.       Bicep reflexes are 2+ on the right side and 2+ on the left side.       Brachioradialis reflexes are 2+ on the right side and 2+ on the left side.       Patellar reflexes are 2+ on the right side and 2+ on the left side.       Achilles reflexes are 2+ on the right side and 2+ on the left side.  Psychiatric:         Speech: Speech normal.       Neurologic Exam     Mental Status   Oriented to person, place, and time.   Registration of memory: Good recent and remote memory.   Attention: normal. Concentration: normal.   Speech: speech is normal   Level of consciousness: alert  Knowledge: consistent with education.     Cranial Nerves     CN II   Visual fields full to confrontation.   Visual acuity: normal    CN III, IV, VI   Pupils are equal, round, and reactive to light.  Extraocular motions are normal.     CN V   Facial sensation intact.   Right corneal reflex: normal  Left corneal reflex: normal    CN VII   Facial expression full, symmetric.   Right facial weakness: none  Left facial weakness: none    CN VIII   Hearing: intact    CN IX, X   Palate: symmetric    CN XI   Right sternocleidomastoid strength: normal  Left sternocleidomastoid strength: normal    CN XII   Tongue: not atrophic  Tongue deviation: none    Motor Exam   Muscle bulk:  normal  Right arm tone: normal  Left arm tone: normal  Right leg tone: normal  Left leg tone: normal    Strength   Strength 5/5 except as noted.     Sensory Exam   Light touch normal.     Gait, Coordination, and Reflexes     Gait  Gait: normal    Coordination   Finger to nose coordination: normal  Heel to shin coordination: normal    Reflexes   Right brachioradialis: 2+  Left brachioradialis: 2+  Right biceps: 2+  Left biceps: 2+  Right triceps: 2+  Left triceps: 2+  Right patellar: 2+  Left patellar: 2+  Right achilles: 2+  Left achilles: 2+  Right : 2+  Left : 2+          Assessment & Plan   Independent Review of Radiographic Studies:      I personally reviewed the images from the following studies.    I reviewed an MRI of the lumbar spine done in July of this year.  This shows a widely patent canal and neuroforamina at L1-2.  L2-3 is fused and looks okay.  L3-4 is fairly open as is L4-5 and L5-S1.  He also had a cervical MRI which shows surgery at C4-5 and C5-6 and those levels show a little residual stenosis but the other levels are all fairly the radiologist felt there was significant foraminal narrowing at C6-7 on the left and on the right at C3-4.    Medical Decision Making:      I told the patient and his wife I thought we should proceed with a cervical and lumbar myelogram.  I told the patient what a myelogram involves.  I explained that there is a 50% chance of developing a bad headache and nausea as a result of the test.  I explained that there is also a very small chance of infection, seizures, and bleeding.  I explained how we would treat a post myelogram headache including bedrest, caffeinated fluids, steroids, and blood patch.  The patient does ask to proceed.    Diagnoses and all orders for this visit:    1. Spinal stenosis of lumbar region with neurogenic claudication (Primary)  -     Obtain Informed Consent; Standing  -     IR Myelogram 2 or More Areas; Future  -     CT Cervical Spine With  Intrathecal Contrast; Future  -     CT Lumbar Spine With Intrathecal Contrast; Future  -     XR Spine Cervical Complete With Flex Ext; Future  -     XR Spine Lumbar Complete With Flex & Ext; Future  -     No Lab Testing Needed; Standing  -     dexamethasone (DECADRON) 4 MG tablet; Take 2 tablets by mouth Take As Directed. Take both tablets by mouth 2 hours before myelogram  Dispense: 2 tablet; Refill: 0    2. Cervical stenosis of spine  -     Obtain Informed Consent; Standing  -     IR Myelogram 2 or More Areas; Future  -     CT Cervical Spine With Intrathecal Contrast; Future  -     CT Lumbar Spine With Intrathecal Contrast; Future  -     XR Spine Cervical Complete With Flex Ext; Future  -     XR Spine Lumbar Complete With Flex & Ext; Future  -     No Lab Testing Needed; Standing  -     dexamethasone (DECADRON) 4 MG tablet; Take 2 tablets by mouth Take As Directed. Take both tablets by mouth 2 hours before myelogram  Dispense: 2 tablet; Refill: 0      Return for After radiology test.

## 2022-10-20 ENCOUNTER — OFFICE VISIT (OUTPATIENT)
Dept: NEUROSURGERY | Facility: CLINIC | Age: 77
End: 2022-10-20

## 2022-10-20 VITALS
OXYGEN SATURATION: 96 % | WEIGHT: 170 LBS | HEIGHT: 69 IN | BODY MASS INDEX: 25.18 KG/M2 | TEMPERATURE: 97.8 F | DIASTOLIC BLOOD PRESSURE: 78 MMHG | HEART RATE: 88 BPM | SYSTOLIC BLOOD PRESSURE: 131 MMHG

## 2022-10-20 DIAGNOSIS — M48.062 SPINAL STENOSIS OF LUMBAR REGION WITH NEUROGENIC CLAUDICATION: Primary | ICD-10-CM

## 2022-10-20 DIAGNOSIS — M48.02 CERVICAL STENOSIS OF SPINE: ICD-10-CM

## 2022-10-20 PROCEDURE — 99204 OFFICE O/P NEW MOD 45 MIN: CPT | Performed by: NEUROLOGICAL SURGERY

## 2022-10-20 RX ORDER — DEXAMETHASONE 4 MG/1
8 TABLET ORAL TAKE AS DIRECTED
Qty: 2 TABLET | Refills: 0 | Status: SHIPPED | OUTPATIENT
Start: 2022-10-20 | End: 2022-11-08 | Stop reason: HOSPADM

## 2022-11-08 ENCOUNTER — HOSPITAL ENCOUNTER (OUTPATIENT)
Dept: GENERAL RADIOLOGY | Facility: HOSPITAL | Age: 77
Discharge: HOME | End: 2022-11-08

## 2022-11-08 ENCOUNTER — APPOINTMENT (OUTPATIENT)
Dept: OTHER | Facility: HOSPITAL | Age: 77
End: 2022-11-08

## 2022-11-08 ENCOUNTER — HOSPITAL ENCOUNTER (OUTPATIENT)
Dept: CT IMAGING | Facility: HOSPITAL | Age: 77
Discharge: HOME | End: 2022-11-08

## 2022-11-08 VITALS
HEART RATE: 57 BPM | RESPIRATION RATE: 18 BRPM | DIASTOLIC BLOOD PRESSURE: 75 MMHG | SYSTOLIC BLOOD PRESSURE: 129 MMHG | TEMPERATURE: 97.4 F | OXYGEN SATURATION: 98 % | HEIGHT: 69 IN | BODY MASS INDEX: 25.18 KG/M2 | WEIGHT: 170 LBS

## 2022-11-08 DIAGNOSIS — M48.02 CERVICAL STENOSIS OF SPINE: ICD-10-CM

## 2022-11-08 DIAGNOSIS — Z09 FOLLOW-UP EXAM: ICD-10-CM

## 2022-11-08 DIAGNOSIS — M48.062 SPINAL STENOSIS OF LUMBAR REGION WITH NEUROGENIC CLAUDICATION: ICD-10-CM

## 2022-11-08 PROCEDURE — 62284 INJECTION FOR MYELOGRAM: CPT | Performed by: NEUROLOGICAL SURGERY

## 2022-11-08 PROCEDURE — 0 LIDOCAINE 1 % SOLUTION: Performed by: NEUROLOGICAL SURGERY

## 2022-11-08 PROCEDURE — 72132 CT LUMBAR SPINE W/DYE: CPT

## 2022-11-08 PROCEDURE — 62284 INJECTION FOR MYELOGRAM: CPT

## 2022-11-08 PROCEDURE — 25010000002 IOPAMIDOL 61 % SOLUTION: Performed by: NEUROLOGICAL SURGERY

## 2022-11-08 PROCEDURE — 72126 CT NECK SPINE W/DYE: CPT

## 2022-11-08 PROCEDURE — 72052 X-RAY EXAM NECK SPINE 6/>VWS: CPT

## 2022-11-08 PROCEDURE — 62305 MYELOGRAPHY LUMBAR INJECTION: CPT

## 2022-11-08 PROCEDURE — 72114 X-RAY EXAM L-S SPINE BENDING: CPT

## 2022-11-08 PROCEDURE — 72240 MYELOGRAPHY NECK SPINE: CPT

## 2022-11-08 RX ORDER — HYDROCODONE BITARTRATE AND ACETAMINOPHEN 5; 325 MG/1; MG/1
1 TABLET ORAL EVERY 4 HOURS PRN
Status: DISCONTINUED | OUTPATIENT
Start: 2022-11-08 | End: 2022-11-09 | Stop reason: HOSPADM

## 2022-11-08 RX ORDER — ACETAMINOPHEN 325 MG/1
650 TABLET ORAL EVERY 4 HOURS PRN
Status: DISCONTINUED | OUTPATIENT
Start: 2022-11-08 | End: 2022-11-09 | Stop reason: HOSPADM

## 2022-11-08 RX ORDER — LIDOCAINE HYDROCHLORIDE 10 MG/ML
10 INJECTION, SOLUTION INFILTRATION; PERINEURAL ONCE
Status: COMPLETED | OUTPATIENT
Start: 2022-11-08 | End: 2022-11-08

## 2022-11-08 RX ADMIN — LIDOCAINE HYDROCHLORIDE 5 ML: 10 INJECTION, SOLUTION INFILTRATION; PERINEURAL at 07:01

## 2022-11-08 RX ADMIN — IOPAMIDOL 15 ML: 612 INJECTION, SOLUTION INTRATHECAL at 07:05

## 2022-11-08 NOTE — NURSING NOTE
Pt in xray triage for Gregorio Myelogram.  Pt wearing mask and RN wearing mask and eye protection for all pt interactions.

## 2022-11-08 NOTE — DISCHARGE INSTRUCTIONS
EDUCATION /DISCHARGE INSTRUCTIONS:    A myelogram is a special radiology procedure of the spinal cord, spinal nerves and other related structures.  You will be awake during the examination.  An area of your lower back will be cleansed with an antiseptic solution.  The physician will inject a numbing medication in your lower back.  While your back is numb, a needle will be placed in the lower back area.  A small amount of spinal fluid may be withdrawn and sent to the lab if ordered by your physician. While the needle is in the back, an injection of a contrast material (xray dye) will be given through the needle.  The contrast material will allow the physician to see the spinal cord and spinal nerves.  Once injected, the needle will be removed and a band aid will be placed over the injection site.  The table will be tilted during the process to allow the contrast material to flow to particular areas in the spine.  Following the injection and xrays, you will be taken to the CT scanner where more pictures will be taken. After the procedure is finished, the contrast material will be absorbed by your body and eliminated through your kidneys.  The radiologist will study and interpret your myelogram and send the results to your physician.  Procedure risks of a myelogram include, but are not limited to:  *  Bleeding   *  Seizure  *  Infection   *  Headache, possibly severe requiring a blood patch  *  Contrast reaction  *  Nerve or cord injury  *  Paralysis and death    Benefits of the procedure:  *  Best examination for delineating pathology related to spinal cord compression from a disc and/or nerve root compression  Alternatives to the procedure:  MRI - a non invasive procedure requiring intravenous contrast injection.  Cannot be done on patients with certain pacemakers or metal in the body.  MRI risks include possible reaction to the contrast material, movement of metal located in the body.Benefit to MRI:  Non-invasive  and usually painless procedure.  THIS EDUCATION INFORMATION WAS REVIEWED PRIOR TO PROCEDURE AND CONSENT. Patient initials_____________Time___6:25 AM___    24 hour rest period ends _Wednesday, November 9, 2022 at 10:00 AM___________________.    Important information following your myelogram:  * ACTIVITY:   *  You may sit up in the car to go home.  *  When you get home, remain on bed rest (flat on your back or on your side) for 24 hours. You may place a rolled up towel under your neck for support  * You may get up to the bathroom and sit up to eat and drink then lie back down  * Drink additional fluids for 24 hours after the myelogram.   * Continue to drink additional fluids for the next 2-3 days. Water and caffeinated beverages are encouraged.  * Remain less active for the next two to three days.  * Do not drive for 24 hours following a myelogram.  * You may remove the bandage and shower in the morning.    CALL YOUR PHYSICIAN FOR THE FOLLOWING:  * Pain at the injection site  * Redness, swelling, bruising or drainage at the injection site.  * A fever by mouth of 101.0 or any new symptoms  Headaches are a common side effect after a myelogram.  If you get a headache, you should stay flat in bed and drink plenty of fluids. If the headache persists and does not go away with rest/medication, CALL Dr. Perkins at [phone].

## 2022-11-09 ENCOUNTER — TELEPHONE (OUTPATIENT)
Dept: INTERVENTIONAL RADIOLOGY/VASCULAR | Facility: HOSPITAL | Age: 77
End: 2022-11-09

## 2022-11-10 ENCOUNTER — OFFICE VISIT (OUTPATIENT)
Dept: NEUROSURGERY | Facility: CLINIC | Age: 77
End: 2022-11-10

## 2022-11-10 VITALS
OXYGEN SATURATION: 98 % | SYSTOLIC BLOOD PRESSURE: 131 MMHG | BODY MASS INDEX: 25.18 KG/M2 | DIASTOLIC BLOOD PRESSURE: 80 MMHG | HEIGHT: 69 IN | TEMPERATURE: 97 F | WEIGHT: 170 LBS

## 2022-11-10 DIAGNOSIS — M48.02 CERVICAL STENOSIS OF SPINE: Primary | ICD-10-CM

## 2022-11-10 DIAGNOSIS — M54.16 LUMBAR RADICULOPATHY: ICD-10-CM

## 2022-11-10 PROCEDURE — 99214 OFFICE O/P EST MOD 30 MIN: CPT | Performed by: NEUROLOGICAL SURGERY

## 2022-11-10 NOTE — PROGRESS NOTES
"Subjective   Patient ID: Coleman Bear is a 77 y.o. male is here today for follow-up with a new C/L Myelogram done on 11/08/2022 for neck, back, leg and arm pain.    Today patient's symptoms are unchanged. Patient is having R arm pain, neck pain, and R arm weakness.     Patient, Provider, and MA are all wearing a mask in our office today.     History of Present Illness     This patient returns today.  He is still having pain in his neck with radiation into his right arm.  He has pain in his lower back with radiation into both of his legs but particularly the left 1.  The pain is sharp and stabbing and quite severe.    The following portions of the patient's history were reviewed and updated as appropriate: allergies, current medications, past family history, past medical history, past social history, past surgical history and problem list.    Review of Systems   Constitutional: Negative for chills and fever.   HENT: Negative for congestion.    Musculoskeletal: Positive for myalgias, neck pain and neck stiffness.   Neurological: Positive for weakness and numbness.       I have reviewed the review of systems as documented by my MA.      Objective     Vitals:    11/10/22 1432   BP: 131/80   Cuff Size: Adult   Temp: 97 °F (36.1 °C)   SpO2: 98%   Weight: 77.1 kg (170 lb)   Height: 175.3 cm (69\")     Body mass index is 25.1 kg/m².    Tobacco Use: Low Risk    • Smoking Tobacco Use: Never   • Smokeless Tobacco Use: Never   • Passive Exposure: Not on file          Physical Exam  Neurological:      Mental Status: He is alert and oriented to person, place, and time.       Neurologic Exam     Mental Status   Oriented to person, place, and time.           Assessment & Plan   Independent Review of Radiographic Studies:      I personally reviewed the images from the following studies.    I reviewed his plain films, myelogram, and CT scan myself.  The plain films show multilevel degenerative disease.  There is a previous fusion " at L2-3.  This appears to be solid.  There is no evidence of instability at any of the other levels.  In the cervical spine there is a fusion at C4-5 and C5-6 which also appear to be solid.  There is a little spondylolisthesis at C6-7 but no evidence of obvious movement on flexion and extension.  On the myelogram itself there is pretty good nerve root filling throughout the lumbar spine.  Even on the standing films I do not see much intrusion into the spinal canal or against the nerves.  In the cervical spine there does appear to be some nerve root compression particularly at C6-7.  There may be a little pressure at C5-6 also.  On the post myelographic CT scan of the lumbar spine the lower thoracic spine looks okay.  L1-2 and L2-3 are widely open as is L3-4 L4-5 and L5-S1.  I really do not see anything operative in the lumbar spine.  In the cervical spine C2-3 is widely open.  C3-4 does show some foraminal stenosis bilaterally that is fairly severe.  C4-5 is also fairly open with open foramina.  C5-6 does show some foraminal stenosis.  C6-7 shows fairly severe left-sided foraminal stenosis.  C7-T1 looks okay.    Medical Decision Making:      I told the patient about the imaging.  I told him that from my point of view I really do not see anything here that would clearly respond to surgery in the lumbar spine.  I think 1 could make a case for an anterior cervical discectomy and fusion at C6-7 in the cervical spine but the pressure on the nerves is certainly not all that bad and I really think he should try some further nonsurgical treatment first.  We will get him into see a pain management doctor about that.    Diagnoses and all orders for this visit:    1. Cervical stenosis of spine (Primary)  -     Ambulatory Referral to Pain Management    2. Lumbar radiculopathy      Return for Recheck and call after treatment or consultation.

## 2022-12-07 ENCOUNTER — ANESTHESIA (OUTPATIENT)
Dept: PAIN MEDICINE | Facility: HOSPITAL | Age: 77
End: 2022-12-07

## 2022-12-07 ENCOUNTER — ANESTHESIA EVENT (OUTPATIENT)
Dept: PAIN MEDICINE | Facility: HOSPITAL | Age: 77
End: 2022-12-07

## 2022-12-07 ENCOUNTER — HOSPITAL ENCOUNTER (OUTPATIENT)
Dept: PAIN MEDICINE | Facility: HOSPITAL | Age: 77
Discharge: HOME | End: 2022-12-07

## 2022-12-07 ENCOUNTER — HOSPITAL ENCOUNTER (OUTPATIENT)
Dept: GENERAL RADIOLOGY | Facility: HOSPITAL | Age: 77
Discharge: HOME | End: 2022-12-07

## 2022-12-07 VITALS
OXYGEN SATURATION: 95 % | RESPIRATION RATE: 16 BRPM | HEART RATE: 75 BPM | SYSTOLIC BLOOD PRESSURE: 130 MMHG | DIASTOLIC BLOOD PRESSURE: 78 MMHG | TEMPERATURE: 98.4 F

## 2022-12-07 DIAGNOSIS — R52 PAIN: ICD-10-CM

## 2022-12-07 DIAGNOSIS — M48.062 SPINAL STENOSIS OF LUMBAR REGION WITH NEUROGENIC CLAUDICATION: Primary | ICD-10-CM

## 2022-12-07 DIAGNOSIS — M99.53 INTERVERTEBRAL DISC STENOSIS OF NEURAL CANAL OF LUMBAR REGION: ICD-10-CM

## 2022-12-07 PROCEDURE — 25010000002 MIDAZOLAM PER 1 MG: Performed by: ANESTHESIOLOGY

## 2022-12-07 PROCEDURE — 0 IOPAMIDOL 41 % SOLUTION: Performed by: ANESTHESIOLOGY

## 2022-12-07 PROCEDURE — 77003 FLUOROGUIDE FOR SPINE INJECT: CPT

## 2022-12-07 PROCEDURE — 25010000002 METHYLPREDNISOLONE PER 80 MG: Performed by: ANESTHESIOLOGY

## 2022-12-07 RX ORDER — METHYLPREDNISOLONE ACETATE 80 MG/ML
80 INJECTION, SUSPENSION INTRA-ARTICULAR; INTRALESIONAL; INTRAMUSCULAR; SOFT TISSUE ONCE
Status: COMPLETED | OUTPATIENT
Start: 2022-12-07 | End: 2022-12-07

## 2022-12-07 RX ORDER — LIDOCAINE HYDROCHLORIDE 10 MG/ML
0.5 INJECTION, SOLUTION INFILTRATION; PERINEURAL ONCE AS NEEDED
Status: DISCONTINUED | OUTPATIENT
Start: 2022-12-07 | End: 2022-12-08 | Stop reason: HOSPADM

## 2022-12-07 RX ORDER — MIDAZOLAM HYDROCHLORIDE 1 MG/ML
1 INJECTION INTRAMUSCULAR; INTRAVENOUS
Status: DISCONTINUED | OUTPATIENT
Start: 2022-12-07 | End: 2022-12-08 | Stop reason: HOSPADM

## 2022-12-07 RX ORDER — FENTANYL CITRATE 50 UG/ML
50 INJECTION, SOLUTION INTRAMUSCULAR; INTRAVENOUS AS NEEDED
Status: DISCONTINUED | OUTPATIENT
Start: 2022-12-07 | End: 2022-12-08 | Stop reason: HOSPADM

## 2022-12-07 RX ORDER — LIDOCAINE HYDROCHLORIDE 10 MG/ML
1 INJECTION, SOLUTION INFILTRATION; PERINEURAL ONCE
Status: DISCONTINUED | OUTPATIENT
Start: 2022-12-07 | End: 2022-12-08 | Stop reason: HOSPADM

## 2022-12-07 RX ORDER — SODIUM CHLORIDE 0.9 % (FLUSH) 0.9 %
10 SYRINGE (ML) INJECTION EVERY 12 HOURS SCHEDULED
Status: DISCONTINUED | OUTPATIENT
Start: 2022-12-07 | End: 2022-12-08 | Stop reason: HOSPADM

## 2022-12-07 RX ORDER — SODIUM CHLORIDE 9 MG/ML
40 INJECTION, SOLUTION INTRAVENOUS AS NEEDED
Status: DISCONTINUED | OUTPATIENT
Start: 2022-12-07 | End: 2022-12-08 | Stop reason: HOSPADM

## 2022-12-07 RX ORDER — SODIUM CHLORIDE 0.9 % (FLUSH) 0.9 %
10 SYRINGE (ML) INJECTION AS NEEDED
Status: DISCONTINUED | OUTPATIENT
Start: 2022-12-07 | End: 2022-12-08 | Stop reason: HOSPADM

## 2022-12-07 RX ADMIN — IOPAMIDOL 10 ML: 408 INJECTION, SOLUTION INTRATHECAL at 10:49

## 2022-12-07 RX ADMIN — MIDAZOLAM 1 MG: 1 INJECTION INTRAMUSCULAR; INTRAVENOUS at 10:47

## 2022-12-07 RX ADMIN — METHYLPREDNISOLONE ACETATE 80 MG: 80 INJECTION, SUSPENSION INTRA-ARTICULAR; INTRALESIONAL; INTRAMUSCULAR; SOFT TISSUE at 10:49

## 2022-12-07 NOTE — H&P
INTERVAL HISTORY:    The patient returns for another Lumbar epidural steroid injection 3 today.  They have received 50 % improvement since their last injection with a pain level of 6-10 /10 at its worst recently. Pain is mostly post left leg down thru his buttock.   Conservative measures tried in the interim. Daily activities are still affected by the pain.    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  Physical therapy exercises at home as prescribed by physical therapy or from the pain clinic handout (given to the patient).  Continuation of these exercises every day, or multiple times per week, even when the patient has good pain relief, was stressed to the patient as a preventative measure to decrease the frequency and severity of future pain episodes.  2.  Continue pain medicines as already prescribed.  If patient not currently taking any, it is recommended to begin Acetaminophen 1000 mg po q 8 hours.  If other medicines containing Acetaminophen are currently prescribed, maintain daily dose at 3000mg.    3.  If they can tolerate NSAIDS, it is recommended to take Ibuprofen 600 mg po q 6 hours for 7 days during pain exacerbations.   Alternatively, they may substitute an NSAID of their choice (e.g. Aleve)  4.  Heat and ice to the affected area as tolerated for pain control.  It was discussed that  heating pads can cause burns.  5.  Low impact exercise such as walking or water exercise was recommended to maintain overall health and aid in weight control.   6.  Follow up as needed for subsequent injections.  7.  Patient was counseled to abstain from tobacco products.    Time : 17   min

## 2022-12-07 NOTE — ANESTHESIA PROCEDURE NOTES
PAIN Epidural block    Pre-sedation assessment completed: 12/7/2022 10:46 AM    Patient reassessed immediately prior to procedure    Patient location during procedure: pain clinic  Start Time: 12/7/2022 10:46 AM  Stop Time: 12/7/2022 10:56 AM  Indication:at surgeon's request and procedure for pain  Performed By  Anesthesiologist: Alfred Sweet MD  Preanesthetic Checklist  Completed: patient identified, IV checked, site marked, risks and benefits discussed, surgical consent, monitors and equipment checked, pre-op evaluation and timeout performed  Additional Notes  Dx:  Post-Op Diagnosis Codes:     * Spinal stenosis of lumbar region with neurogenic claudication (M48.062)     * Intervertebral disc stenosis of neural canal of lumbar region (M99.53)  80 mg depomedrol in epid    Plan : return to clinic as needed  Prep:  Pt Position:prone (prone)  Sterile Tech:cap, gloves, mask and sterile barrier  Prep:chlorhexidine gluconate and isopropyl alcohol  Monitoring:blood pressure monitoring, EKG and continuous pulse oximetry  Procedure:Sedation: yes     Approach:midline  Guidance: fluoroscopy and c arm pa and lat and loss of resistance  Location:lumbar  Level:L5-S1 (interlaminar)  Needle Type:OmniStratbindu  Needle Gauge:20  Aspiration:negative  Medications:  Preservative Free Saline:3mL  Isovue:2mL  Depomedrol:80 mg  Post Assessment:  Post-procedure: bandaid.  Pt Tolerance:patient tolerated the procedure well with no apparent complications  Complications:no

## 2023-02-07 ENCOUNTER — ANESTHESIA EVENT (OUTPATIENT)
Dept: PAIN MEDICINE | Facility: HOSPITAL | Age: 78
End: 2023-02-07
Payer: OTHER GOVERNMENT

## 2023-02-07 ENCOUNTER — HOSPITAL ENCOUNTER (OUTPATIENT)
Dept: PAIN MEDICINE | Facility: HOSPITAL | Age: 78
Discharge: HOME | End: 2023-02-07
Payer: OTHER GOVERNMENT

## 2023-02-07 ENCOUNTER — HOSPITAL ENCOUNTER (OUTPATIENT)
Dept: GENERAL RADIOLOGY | Facility: HOSPITAL | Age: 78
Discharge: HOME | End: 2023-02-07
Payer: OTHER GOVERNMENT

## 2023-02-07 ENCOUNTER — ANESTHESIA (OUTPATIENT)
Dept: PAIN MEDICINE | Facility: HOSPITAL | Age: 78
End: 2023-02-07
Payer: OTHER GOVERNMENT

## 2023-02-07 VITALS
TEMPERATURE: 97.7 F | SYSTOLIC BLOOD PRESSURE: 137 MMHG | RESPIRATION RATE: 16 BRPM | OXYGEN SATURATION: 98 % | HEART RATE: 60 BPM | DIASTOLIC BLOOD PRESSURE: 82 MMHG

## 2023-02-07 DIAGNOSIS — M48.02 CERVICAL STENOSIS OF SPINE: Primary | ICD-10-CM

## 2023-02-07 DIAGNOSIS — R52 PAIN: ICD-10-CM

## 2023-02-07 DIAGNOSIS — M48.062 SPINAL STENOSIS OF LUMBAR REGION WITH NEUROGENIC CLAUDICATION: ICD-10-CM

## 2023-02-07 PROCEDURE — 77003 FLUOROGUIDE FOR SPINE INJECT: CPT

## 2023-02-07 PROCEDURE — 0 IOPAMIDOL 41 % SOLUTION: Performed by: ANESTHESIOLOGY

## 2023-02-07 PROCEDURE — 25010000002 METHYLPREDNISOLONE PER 80 MG: Performed by: ANESTHESIOLOGY

## 2023-02-07 RX ORDER — FENTANYL CITRATE 50 UG/ML
50 INJECTION, SOLUTION INTRAMUSCULAR; INTRAVENOUS AS NEEDED
Status: DISCONTINUED | OUTPATIENT
Start: 2023-02-07 | End: 2023-02-08 | Stop reason: HOSPADM

## 2023-02-07 RX ORDER — MIDAZOLAM HYDROCHLORIDE 1 MG/ML
1 INJECTION INTRAMUSCULAR; INTRAVENOUS AS NEEDED
Status: DISCONTINUED | OUTPATIENT
Start: 2023-02-07 | End: 2023-02-08 | Stop reason: HOSPADM

## 2023-02-07 RX ORDER — LIDOCAINE HYDROCHLORIDE 10 MG/ML
1 INJECTION, SOLUTION INFILTRATION; PERINEURAL ONCE AS NEEDED
Status: DISCONTINUED | OUTPATIENT
Start: 2023-02-07 | End: 2023-02-08 | Stop reason: HOSPADM

## 2023-02-07 RX ORDER — SODIUM CHLORIDE 0.9 % (FLUSH) 0.9 %
1-10 SYRINGE (ML) INJECTION AS NEEDED
Status: DISCONTINUED | OUTPATIENT
Start: 2023-02-07 | End: 2023-02-08 | Stop reason: HOSPADM

## 2023-02-07 RX ORDER — METHYLPREDNISOLONE ACETATE 80 MG/ML
80 INJECTION, SUSPENSION INTRA-ARTICULAR; INTRALESIONAL; INTRAMUSCULAR; SOFT TISSUE ONCE
Status: COMPLETED | OUTPATIENT
Start: 2023-02-07 | End: 2023-02-07

## 2023-02-07 RX ADMIN — METHYLPREDNISOLONE ACETATE 80 MG: 80 INJECTION, SUSPENSION INTRA-ARTICULAR; INTRALESIONAL; INTRAMUSCULAR; SOFT TISSUE at 13:26

## 2023-02-07 RX ADMIN — IOPAMIDOL 10 ML: 408 INJECTION, SOLUTION INTRATHECAL at 13:26

## 2023-02-07 NOTE — ANESTHESIA PROCEDURE NOTES
PAIN Epidural block    Pre-sedation assessment completed: 2/7/2023 1:20 PM    Patient reassessed immediately prior to procedure    Patient location during procedure: pain clinic  Start Time: 2/7/2023 1:20 PM  Stop Time: 2/7/2023 1:30 PM  Indication:procedure for pain  Performed By  Anesthesiologist: Maira Marks MD  Preanesthetic Checklist  Completed: patient identified, IV checked, site marked, risks and benefits discussed, surgical consent, monitors and equipment checked, pre-op evaluation and timeout performed  Additional Notes  Dx: cervical radiculopathy.   Fluoro used.    Prep:  Pt Position:prone  Sterile Tech:cap, gloves, mask and sterile barrier  Prep:chlorhexidine gluconate and isopropyl alcohol  Monitoring:blood pressure monitoring, continuous pulse oximetry and EKG  Procedure:Sedation: no     Approach:midline  Guidance: fluoroscopy  Location:cervical  Level:6-7  Needle Type:Tuohy  Needle Gauge:20  Aspiration:negative  Medications:  Preservative Free Saline:3mL  Isovue:2mL  Comments:B/l spread of dye c/w epidurogramDepomedrol:80  Post Assessment:  Dressing:occlusive dressing applied  Pt Tolerance:patient tolerated the procedure well with no apparent complications  Complications:no

## 2023-02-07 NOTE — H&P
[facility]    History and Physical    Patient Name: Coleman Bear  :  1945  MRN:  [id number]  Date of Admission: 2023    Subjective     Patient is a 77 y.o. male presents with chief complaint of acute on chronic, moderate neck and arm: right pain.  Onset of symptoms was gradual starting several years ago.  Symptoms are associated/aggravated by nothing in particular or activity. Symptoms improve with nothing.  Has had LESI in past with good results.  Now right arm/neck pain is most concerning.  He presents for telly today.      The following portions of the patients history were reviewed and updated as appropriate: current medications, allergies, past medical history, past surgical history, past family history, past social history and problem list                Objective     Past Medical History:   Past Medical History:   Diagnosis Date   • Arthritis    • BPH (benign prostatic hyperplasia)    • Chronic pain disorder    • High cholesterol    • Hip pain, right    • HTN (hypertension)    • Intervertebral disc stenosis of neural canal of lumbar region 2022   • Low back pain    • Lumbar stenosis     ABOVE THE L3-5 FUSION   • Postoperative urinary retention     REQUIRES CATHETER REMAINING FOR DAYS POST OP PER UROLOGY   • Shoulder pain      Past Surgical History:   Past Surgical History:   Procedure Laterality Date   • ANTERIOR CERVICAL DISCECTOMY W/ FUSION      LIMITED RANGE OF MOTION   • EPIDURAL BLOCK     • HERNIA REPAIR      ing   • LUMBAR DISCECTOMY  1992    AND FUSION   • LUMBAR DISCECTOMY FUSION INSTRUMENTATION N/A 2017    Procedure: L2-L3 repeat laminectomy and fusion with instrumentation, removal instrumentation L3 to L5;  Surgeon: Cameron Mcneil MD;  Location: [facility];  Service:    • LUMBAR EPIDURAL INJECTION     • LUMBAR LAMINECTOMY WITH FUSION  2006   • PROSTATE SURGERY     • SHOULDER ARTHROSCOPY W/ SUBACROMIAL DECOMPRESSION AND DISTAL CLAVICLE EXCISION Right  03/04/2011    AND EXTENSIVE ROTATOR CUFF DEBRIDEMENT   • TOTAL HIP ARTHROPLASTY Left 03/16/2018    Procedure: TOTAL HIP ARTHROPLASTY;  Surgeon: Kris Pal MD;  Location: [facility] MAIN OR;  Service: Orthopedics   • TOTAL HIP ARTHROPLASTY Right 09/16/2020    Procedure: TOTAL HIP ARTHROPLASTY;  Surgeon: Kris Pal MD;  Location: [facility] MAIN OR;  Service: Orthopedics;  Laterality: Right;     Family History:   Family History   Problem Relation Age of Onset   • Hypertension Other    • Cancer Mother         breast   • Emphysema Father    • Asthma Father    • Malig Hyperthermia Neg Hx      Social History:   Social History     Socioeconomic History   • Marital status:    Tobacco Use   • Smoking status: Never   • Smokeless tobacco: Never   Substance and Sexual Activity   • Alcohol use: No   • Drug use: No       Vital Signs Range for the last 24 hours  Temperature: Temp:  [36.5 °C (97.7 °F)] 36.5 °C (97.7 °F)   Temp Source: Temp src: Temporal   BP: BP: (145)/(92) 145/92   Pulse: Heart Rate:  [61] 61   Respirations: Resp:  [14] 14   SPO2: SpO2:  [99 %] 99 %   O2 Amount (l/min):     O2 Devices     Weight:           --------------------------------------------------------------------------------    Current Outpatient Medications   Medication Sig Dispense Refill   • amLODIPine (NORVASC) 10 MG tablet Take 10 mg by mouth Every Morning.     • atorvastatin (LIPITOR) 20 MG tablet Take 20 mg by mouth Every Morning.     • triamterene-hydrochlorothiazide (MAXZIDE) 75-50 MG per tablet Take 1 capsule by mouth Every Morning.     • ACETAMINOPHEN PO Take 650 mg by mouth Every 6 (Six) Hours As Needed for Mild Pain  or Moderate Pain .     • ondansetron (ZOFRAN) 4 MG tablet Take 1 tablet by mouth Every 6 (Six) Hours As Needed for Nausea or Vomiting. 10 tablet 0   • pantoprazole (PROTONIX) 40 MG EC tablet Take 1 tablet by mouth Daily. 14 tablet 0     No current facility-administered medications for this encounter.      Facility-Administered Medications Ordered in Other Encounters   Medication Dose Route Frequency Provider Last Rate Last Admin   • Chlorhexidine Gluconate 2 % pads 2 each  2 pad Apply externally BID Kris Pal MD           --------------------------------------------------------------------------------  Assessment & Plan      Anesthesia Evaluation     Patient summary reviewed and Nursing notes reviewed                Airway   Mallampati: II  Dental      Pulmonary - negative pulmonary ROS   Cardiovascular   Exercise tolerance: good (4-7 METS)    (+) hypertension, hyperlipidemia,       Neuro/Psych  (+) numbness,    GI/Hepatic/Renal/Endo - negative ROS     Musculoskeletal     (+) back pain, chronic pain, radiculopathy  Abdominal    Substance History - negative use     OB/GYN negative ob/gyn ROS         Other   arthritis,                 Diagnosis and Plan    Treatment Plan  ASA 2      Procedures: Cervical Epidural Steroid Injection(ELIAS), With fluoroscopy,       Anesthetic plan and risks discussed with patient.          Diagnosis     * Cervical spinal stenosis [M48.02]     * Cervical radiculopathy [M54.12]

## 2023-02-07 NOTE — DISCHARGE INSTRUCTIONS
"Guide To Relieving And Avoiding Neck Pain    Exercise is important to help prevent and treat neck pain.  Good posture, exercise and avoiding injury will help to keep your neck healthy.    When the neck is strained or over worked symptoms may include headache, upper back pain, shoulder pain or arm pain.  Numbness or tingling in the fingers, dizziness or nausea may also occur.    Posture:    Avoid slumping over a desk.  Raise your work (including computer) to eye level to avoid bending at the neck.      Change Position Often:  Changing position prevents overuse of particular muscles.    Sleep On One Pillow:  Using to many pillows or to large of a pillow causes a \"kink\" in you neck.      Move and Exercise:  Living an active lifestyle is an important part of staying healthy.  Be sure to include the exercise to follow specifically for your neck.                    Range of Motion Exercises:  Do these exercises three times a day.  If you experience increased pain stop and contact your physician.  All exercises can be performed sitting or standing.        1.    2.   3.    1.  Place both hands behind you neck.  Gently tilt your neck backward so that you are looking at the ceiling.  Hold for a count of 10.    2.  Look straight facing forward.  Slowly tip your ear toward your right shoulder.  Do not force the motion.  Hold for a count of 10.  Bring head back to starting position and repeat to left side.    3.  Look straight facing forward.  Gently turn your head to the right.  Do not force the motion.  Hold for a count of 10.  Bring head back to starting position and repeat to the left side.    Exercises To Strengthen Muscles:  1.  Look straight facing forward.  Relax your shoulders.  Raise both shoulders toward your ears.  Hold for 3 seconds.       1.       2.   3.      1.  Look straight facing forward.  Relax your shoulders.  Raise both shoulders toward     2.  Raise your ars to your side and bend your elbows.  Squeeze " shoulder blades together as you rotate your arms outward.  Hold for 5 seconds.    3.  Look straight facing forward.  Pull your head straight back.  Do not tip or move your jaw.  Hold for 5 seconds.

## 2023-05-04 ENCOUNTER — TELEPHONE (OUTPATIENT)
Dept: NEUROSURGERY | Facility: CLINIC | Age: 78
End: 2023-05-04
Payer: OTHER GOVERNMENT

## 2023-08-21 NOTE — TELEPHONE ENCOUNTER
Pt stopped by the office and would like for you to call him regarding his surgery; he stated he would like to clarify the information he is getting from you and Dr Rodriguez.   Routed to VU ,Please advise

## (undated) DEVICE — SOL NACL 0.9PCT 100ML SGL

## (undated) DEVICE — 3M™ IOBAN™ 2 ANTIMICROBIAL INCISE DRAPE 6650EZ: Brand: IOBAN™ 2

## (undated) DEVICE — TOWEL,OR,DSP,ST,NATURAL,DLX,4/PK,20PK/CS: Brand: MEDLINE

## (undated) DEVICE — ENCORE® LATEX ORTHO SIZE 8, STERILE LATEX POWDER-FREE SURGICAL GLOVE: Brand: ENCORE

## (undated) DEVICE — TOWEL,OR,DSP,ST,BLUE,STD,4/PK,20PK/CS: Brand: MEDLINE

## (undated) DEVICE — TRAP FLD MINIVAC MEGADYNE 100ML

## (undated) DEVICE — 3M™ IOBAN™ 2 ANTIMICROBIAL INCISE DRAPE 6640EZ: Brand: IOBAN™ 2

## (undated) DEVICE — PENCL E/S ULTRAVAC TELESCP NOSE HOLSTR 10FT

## (undated) DEVICE — GLV SURG SENSICARE W/ALOE PF LF 8 STRL

## (undated) DEVICE — BANDAGE,GAUZE,BULKEE II,4.5"X4.1YD,STRL: Brand: MEDLINE

## (undated) DEVICE — SYR LUERLOK 30CC

## (undated) DEVICE — OCCLUSIVE GAUZE STRIP,3% BISMUTH TRIBROMOPHENATE IN PETROLATUM BLEND: Brand: XEROFORM

## (undated) DEVICE — SYR CONTRL LUERLOK 10CC

## (undated) DEVICE — ANTIBACTERIAL UNDYED BRAIDED (POLYGLACTIN 910), SYNTHETIC ABSORBABLE SUTURE: Brand: COATED VICRYL

## (undated) DEVICE — GLV SURG SENSICARE W/ALOE PF LF 7.5 STRL

## (undated) DEVICE — GLV SURG SENSICARE PI PF LF 7 GRN STRL

## (undated) DEVICE — 6.0MM PRECISION ROUND

## (undated) DEVICE — SUT VIC 0 CT1 36IN J946H

## (undated) DEVICE — SOL NACL 0.9PCT 1000ML

## (undated) DEVICE — SUT MNCRYL PLS ANTIB UD 4/0 PS2 18IN

## (undated) DEVICE — SUT VIC 3/0 CTI 36IN J944H

## (undated) DEVICE — PK HIP TOTL 40

## (undated) DEVICE — PAD,ABDOMINAL,8"X10",ST,LF: Brand: MEDLINE

## (undated) DEVICE — SUT VIC 1 OS8 27IN J699H

## (undated) DEVICE — PK NEURO SPINE 40

## (undated) DEVICE — MAT FLR ABSORBENT LG 4FT 10 2.5FT

## (undated) DEVICE — MEDI-VAC YANKAUER SUCTION HANDLE W/BULBOUS TIP: Brand: CARDINAL HEALTH

## (undated) DEVICE — SPNG GZ WOVN 4X4IN 12PLY 10/BX STRL

## (undated) DEVICE — NDL HYPO PRECISIONGLIDE REG 25G 1 1/2

## (undated) DEVICE — 3M™ STERI-STRIP™ REINFORCED ADHESIVE SKIN CLOSURES, R1547, 1/2 IN X 4 IN (12 MM X 100 MM), 6 STRIPS/ENVELOPE: Brand: 3M™ STERI-STRIP™

## (undated) DEVICE — PK ATS CUST W CARDIOTOMY RESEVOIR

## (undated) DEVICE — APPL DURAPREP IODOPHOR APL 26ML

## (undated) DEVICE — DRSNG BRDR MEPILEX P/OP SIL 4X8IN

## (undated) DEVICE — REFLECTION FLEXIBLE DRILL 25MM: Brand: REFLECTION

## (undated) DEVICE — ENCORE® LATEX ORTHO SIZE 7.5, STERILE LATEX POWDER-FREE SURGICAL GLOVE: Brand: ENCORE

## (undated) DEVICE — COVER,LIGHT HANDLE,FLX,2/PK: Brand: MEDLINE INDUSTRIES, INC.

## (undated) DEVICE — APPL CHLORAPREP W/TINT 26ML ORNG

## (undated) DEVICE — PENCL E/S HNDSWCH ROCKR CB

## (undated) DEVICE — DRSNG WND GZ PAD BORDERED 4X8IN STRL

## (undated) DEVICE — HANDPIECE SET WITH COAXIAL HIGH FLOW TIP AND SUCTION TUBE: Brand: INTERPULSE

## (undated) DEVICE — ADHS SKIN DERMABOND TOP ADVANCED

## (undated) DEVICE — PREP SOL POVIDONE/IODINE BT 4OZ

## (undated) DEVICE — PREMIUM WET SKIN PREP TRAY: Brand: MEDLINE INDUSTRIES, INC.

## (undated) DEVICE — FLOSEAL MATRIX IS INDICATED IN SURGICAL PROCEDURES (OTHER THAN IN OPHTHALMIC) AS AN ADJUNCT TO HEMOSTASIS WHEN CONTROL OF BLEEDING BY LIGATURE OR CONVENTIONAL PROCEDURES IS INEFFECTIVE OR IMPRACTICAL.: Brand: FLOSEAL HEMOSTATIC MATRIX

## (undated) DEVICE — DIFFUSER: Brand: CORE, MAESTRO

## (undated) DEVICE — SUT VIC 0 CT1 CR8 18IN J840D

## (undated) DEVICE — SUT PDS 1 CT1 36IN Z347H

## (undated) DEVICE — GLV SURG PREMIERPRO ORTHO LTX PF SZ7.5 BRN

## (undated) DEVICE — SPONGE,LAP,12"X12",XR,ST,5/PK,40PK/CS: Brand: MEDLINE

## (undated) DEVICE — TOTAL TRAY, 16FR 10ML SIL FOLEY, URN: Brand: MEDLINE

## (undated) DEVICE — BONE MARROW ASPIRATION NEEDLES ASPIRATOR KIT 3-HOLE 4 INCHES NDLE

## (undated) DEVICE — DISPOSABLE BIPOLAR CABLE 12FT. (3.6M): Brand: KIRWAN

## (undated) DEVICE — GOWN,REINF,POLY,ECL,PP SLV,XL: Brand: MEDLINE

## (undated) DEVICE — GLV SURG SENSICARE MICRO PF LF 7 STRL

## (undated) DEVICE — OIL CARTRIDGE: Brand: CORE, MAESTRO

## (undated) DEVICE — CODMAN® SURGICAL PATTIES 3/4" X 3/4" (1.91CM X 1.91CM): Brand: CODMAN®

## (undated) DEVICE — GLV SURG SENSICARE GREEN W/ALOE PF LF 7 STRL

## (undated) DEVICE — GLV SURG SENSICARE PI MIC PF SZ7 LF STRL

## (undated) DEVICE — ENCORE® LATEX ORTHO SIZE 8.5, STERILE LATEX POWDER-FREE SURGICAL GLOVE: Brand: ENCORE

## (undated) DEVICE — THE MILL DISPOSABLE - MEDIUM

## (undated) DEVICE — SUT VIC 1 CT1 36IN J947H

## (undated) DEVICE — DRSNG SURESITE WNDW 4X4.5

## (undated) DEVICE — SUT VIC 3/0 PS2 27IN J427H